# Patient Record
Sex: MALE | Race: WHITE | Employment: OTHER | URBAN - NONMETROPOLITAN AREA
[De-identification: names, ages, dates, MRNs, and addresses within clinical notes are randomized per-mention and may not be internally consistent; named-entity substitution may affect disease eponyms.]

---

## 2017-04-14 ENCOUNTER — OFFICE VISIT (OUTPATIENT)
Dept: CARDIOLOGY | Age: 67
End: 2017-04-14
Payer: COMMERCIAL

## 2017-04-14 VITALS
WEIGHT: 286 LBS | DIASTOLIC BLOOD PRESSURE: 82 MMHG | SYSTOLIC BLOOD PRESSURE: 138 MMHG | BODY MASS INDEX: 35.56 KG/M2 | HEART RATE: 73 BPM | HEIGHT: 75 IN

## 2017-04-14 DIAGNOSIS — I25.10 CORONARY ARTERY DISEASE INVOLVING NATIVE HEART WITHOUT ANGINA PECTORIS, UNSPECIFIED VESSEL OR LESION TYPE: ICD-10-CM

## 2017-04-14 DIAGNOSIS — I25.10 CORONARY ARTERY DISEASE INVOLVING NATIVE CORONARY ARTERY OF NATIVE HEART WITHOUT ANGINA PECTORIS: ICD-10-CM

## 2017-04-14 DIAGNOSIS — I73.9 CLAUDICATION (HCC): ICD-10-CM

## 2017-04-14 DIAGNOSIS — R06.09 DOE (DYSPNEA ON EXERTION): ICD-10-CM

## 2017-04-14 DIAGNOSIS — I10 ESSENTIAL HYPERTENSION: Primary | ICD-10-CM

## 2017-04-14 PROCEDURE — 93000 ELECTROCARDIOGRAM COMPLETE: CPT | Performed by: CLINICAL NURSE SPECIALIST

## 2017-04-14 PROCEDURE — 99214 OFFICE O/P EST MOD 30 MIN: CPT | Performed by: CLINICAL NURSE SPECIALIST

## 2017-04-14 RX ORDER — METOPROLOL SUCCINATE 25 MG/1
25 TABLET, EXTENDED RELEASE ORAL DAILY
Qty: 90 TABLET | Refills: 3 | Status: SHIPPED | OUTPATIENT
Start: 2017-04-14 | End: 2017-04-17 | Stop reason: SDUPTHER

## 2017-04-14 RX ORDER — LISINOPRIL 5 MG/1
5 TABLET ORAL 2 TIMES DAILY
Qty: 180 TABLET | Refills: 3 | Status: SHIPPED | OUTPATIENT
Start: 2017-04-14 | End: 2017-04-17 | Stop reason: SDUPTHER

## 2017-04-14 RX ORDER — ATORVASTATIN CALCIUM 40 MG/1
40 TABLET, FILM COATED ORAL NIGHTLY
Qty: 90 TABLET | Refills: 3 | Status: SHIPPED | OUTPATIENT
Start: 2017-04-14 | End: 2017-04-17 | Stop reason: SDUPTHER

## 2017-04-14 RX ORDER — PRASUGREL 10 MG/1
10 TABLET, FILM COATED ORAL DAILY
Qty: 90 TABLET | Refills: 3 | Status: SHIPPED | OUTPATIENT
Start: 2017-04-14 | End: 2017-04-17 | Stop reason: SDUPTHER

## 2017-04-17 DIAGNOSIS — I10 ESSENTIAL HYPERTENSION: ICD-10-CM

## 2017-04-17 DIAGNOSIS — I25.10 CORONARY ARTERY DISEASE INVOLVING NATIVE HEART WITHOUT ANGINA PECTORIS, UNSPECIFIED VESSEL OR LESION TYPE: ICD-10-CM

## 2017-04-17 RX ORDER — ATORVASTATIN CALCIUM 40 MG/1
40 TABLET, FILM COATED ORAL NIGHTLY
Qty: 90 TABLET | Refills: 3 | Status: SHIPPED | OUTPATIENT
Start: 2017-04-17 | End: 2021-08-26 | Stop reason: ALTCHOICE

## 2017-04-17 RX ORDER — LISINOPRIL 5 MG/1
5 TABLET ORAL 2 TIMES DAILY
Qty: 180 TABLET | Refills: 3 | Status: SHIPPED | OUTPATIENT
Start: 2017-04-17 | End: 2021-09-22 | Stop reason: ALTCHOICE

## 2017-04-17 RX ORDER — PRASUGREL 10 MG/1
10 TABLET, FILM COATED ORAL DAILY
Qty: 90 TABLET | Refills: 3 | Status: SHIPPED | OUTPATIENT
Start: 2017-04-17 | End: 2018-07-26 | Stop reason: ALTCHOICE

## 2017-04-17 RX ORDER — METOPROLOL SUCCINATE 25 MG/1
25 TABLET, EXTENDED RELEASE ORAL DAILY
Qty: 90 TABLET | Refills: 3 | Status: SHIPPED | OUTPATIENT
Start: 2017-04-17

## 2018-07-26 ENCOUNTER — APPOINTMENT (OUTPATIENT)
Dept: CT IMAGING | Age: 68
End: 2018-07-26
Payer: COMMERCIAL

## 2018-07-26 ENCOUNTER — HOSPITAL ENCOUNTER (EMERGENCY)
Age: 68
Discharge: HOME | End: 2018-07-26
Payer: COMMERCIAL

## 2018-07-26 VITALS
SYSTOLIC BLOOD PRESSURE: 126 MMHG | OXYGEN SATURATION: 94 % | RESPIRATION RATE: 18 BRPM | HEART RATE: 71 BPM | HEIGHT: 75 IN | DIASTOLIC BLOOD PRESSURE: 71 MMHG | TEMPERATURE: 97.8 F | WEIGHT: 252 LBS | BODY MASS INDEX: 31.33 KG/M2

## 2018-07-26 DIAGNOSIS — K40.90 LEFT INGUINAL HERNIA: ICD-10-CM

## 2018-07-26 DIAGNOSIS — N20.0 BILATERAL NEPHROLITHIASIS: ICD-10-CM
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
## 2018-07-26 DIAGNOSIS — S09.93XA FACIAL INJURY, INITIAL ENCOUNTER: ICD-10-CM

## 2018-07-26 DIAGNOSIS — Z79.01 ANTICOAGULATED ON COUMADIN: ICD-10-CM

## 2018-07-26 DIAGNOSIS — N20.1 CALCULUS OF PROXIMAL RIGHT URETER: Primary | ICD-10-CM

## 2018-07-26 LAB
ALBUMIN SERPL-MCNC: 3.7 G/DL (ref 3.5–5.2)
ALP BLD-CCNC: 71 U/L (ref 40–130)
ALT SERPL-CCNC: 25 U/L (ref 5–41)
ANION GAP SERPL CALCULATED.3IONS-SCNC: 11 MMOL/L (ref 7–19)
APTT: 37.8 SEC (ref 26–36.2)
AST SERPL-CCNC: 21 U/L (ref 5–40)
BACTERIA: NEGATIVE /HPF
BASOPHILS ABSOLUTE: 0 K/UL (ref 0–0.2)
BASOPHILS RELATIVE PERCENT: 0.3 % (ref 0–1)
BILIRUB SERPL-MCNC: 0.4 MG/DL (ref 0.2–1.2)
BILIRUBIN URINE: NEGATIVE
BLOOD, URINE: ABNORMAL
BUN BLDV-MCNC: 23 MG/DL (ref 8–23)
CALCIUM SERPL-MCNC: 9 MG/DL (ref 8.8–10.2)
CHLORIDE BLD-SCNC: 104 MMOL/L (ref 98–111)
CLARITY: CLEAR
CO2: 26 MMOL/L (ref 22–29)
COLOR: YELLOW
CREAT SERPL-MCNC: 1.3 MG/DL (ref 0.5–1.2)
EOSINOPHILS ABSOLUTE: 0.2 K/UL (ref 0–0.6)
EOSINOPHILS RELATIVE PERCENT: 2.3 % (ref 0–5)
EPITHELIAL CELLS, UA: 1 /HPF (ref 0–5)
GFR NON-AFRICAN AMERICAN: 55
GLUCOSE BLD-MCNC: 100 MG/DL (ref 74–109)
GLUCOSE URINE: NEGATIVE MG/DL
HCT VFR BLD CALC: 44.8 % (ref 42–52)
HEMOGLOBIN: 14.1 G/DL (ref 14–18)
HYALINE CASTS: 3 /HPF (ref 0–8)
INR BLD: 2.28 (ref 0.88–1.18)
KETONES, URINE: NEGATIVE MG/DL
LEUKOCYTE ESTERASE, URINE: ABNORMAL
LYMPHOCYTES ABSOLUTE: 0.9 K/UL (ref 1.1–4.5)
LYMPHOCYTES RELATIVE PERCENT: 14 % (ref 20–40)
MCH RBC QN AUTO: 26.8 PG (ref 27–31)
MCHC RBC AUTO-ENTMCNC: 31.5 G/DL (ref 33–37)
MCV RBC AUTO: 85.2 FL (ref 80–94)
MONOCYTES ABSOLUTE: 0.5 K/UL (ref 0–0.9)
MONOCYTES RELATIVE PERCENT: 7.8 % (ref 0–10)
NEUTROPHILS ABSOLUTE: 4.9 K/UL (ref 1.5–7.5)
NEUTROPHILS RELATIVE PERCENT: 75.1 % (ref 50–65)
NITRITE, URINE: NEGATIVE
PDW BLD-RTO: 13.9 % (ref 11.5–14.5)
PH UA: 6.5
PLATELET # BLD: 110 K/UL (ref 130–400)
PMV BLD AUTO: 12.1 FL (ref 9.4–12.4)
POTASSIUM SERPL-SCNC: 4.2 MMOL/L (ref 3.5–5)
PROTEIN UA: NEGATIVE MG/DL
PROTHROMBIN TIME: 25.2 SEC (ref 12–14.6)
RBC # BLD: 5.26 M/UL (ref 4.7–6.1)
RBC UA: 430 /HPF (ref 0–4)
SODIUM BLD-SCNC: 141 MMOL/L (ref 136–145)
SPECIFIC GRAVITY UA: 1.02
TOTAL PROTEIN: 7.4 G/DL (ref 6.6–8.7)
URINE REFLEX TO CULTURE: YES
UROBILINOGEN, URINE: 1 E.U./DL
WBC # BLD: 6.5 K/UL (ref 4.8–10.8)
WBC UA: 6 /HPF (ref 0–5)

## 2018-07-26 PROCEDURE — 6360000002 HC RX W HCPCS: Performed by: NURSE PRACTITIONER

## 2018-07-26 PROCEDURE — 2580000003 HC RX 258: Performed by: NURSE PRACTITIONER

## 2018-07-26 PROCEDURE — 80053 COMPREHEN METABOLIC PANEL: CPT

## 2018-07-26 PROCEDURE — 36415 COLL VENOUS BLD VENIPUNCTURE: CPT

## 2018-07-26 PROCEDURE — 96375 TX/PRO/DX INJ NEW DRUG ADDON: CPT

## 2018-07-26 PROCEDURE — 87077 CULTURE AEROBIC IDENTIFY: CPT

## 2018-07-26 PROCEDURE — 85730 THROMBOPLASTIN TIME PARTIAL: CPT

## 2018-07-26 PROCEDURE — 6360000004 HC RX CONTRAST MEDICATION: Performed by: NURSE PRACTITIONER

## 2018-07-26 PROCEDURE — 85610 PROTHROMBIN TIME: CPT

## 2018-07-26 PROCEDURE — 70450 CT HEAD/BRAIN W/O DYE: CPT

## 2018-07-26 PROCEDURE — 74177 CT ABD & PELVIS W/CONTRAST: CPT

## 2018-07-26 PROCEDURE — 96374 THER/PROPH/DIAG INJ IV PUSH: CPT

## 2018-07-26 PROCEDURE — 87086 URINE CULTURE/COLONY COUNT: CPT

## 2018-07-26 PROCEDURE — 85025 COMPLETE CBC W/AUTO DIFF WBC: CPT

## 2018-07-26 PROCEDURE — 99284 EMERGENCY DEPT VISIT MOD MDM: CPT | Performed by: NURSE PRACTITIONER

## 2018-07-26 PROCEDURE — 99284 EMERGENCY DEPT VISIT MOD MDM: CPT

## 2018-07-26 PROCEDURE — 81001 URINALYSIS AUTO W/SCOPE: CPT

## 2018-07-26 RX ORDER — HYDROCODONE BITARTRATE AND ACETAMINOPHEN 5; 325 MG/1; MG/1
1 TABLET ORAL EVERY 6 HOURS PRN
Qty: 12 TABLET | Refills: 0 | Status: SHIPPED | OUTPATIENT
Start: 2018-07-26 | End: 2018-07-29

## 2018-07-26 RX ORDER — MORPHINE SULFATE 1 MG/ML
INJECTION, SOLUTION EPIDURAL; INTRATHECAL; INTRAVENOUS
Status: DISCONTINUED
Start: 2018-07-26 | End: 2018-07-26 | Stop reason: WASHOUT

## 2018-07-26 RX ORDER — WARFARIN SODIUM 5 MG/1
5 TABLET ORAL
Status: ON HOLD | COMMUNITY
End: 2018-08-21 | Stop reason: HOSPADM

## 2018-07-26 RX ORDER — MORPHINE SULFATE 1 MG/ML
2 INJECTION, SOLUTION EPIDURAL; INTRATHECAL; INTRAVENOUS ONCE
Status: COMPLETED | OUTPATIENT
Start: 2018-07-26 | End: 2018-07-26

## 2018-07-26 RX ORDER — WARFARIN SODIUM 7.5 MG/1
7.5 TABLET ORAL
Status: ON HOLD | COMMUNITY
End: 2018-08-21 | Stop reason: HOSPADM

## 2018-07-26 RX ORDER — TAMSULOSIN HYDROCHLORIDE 0.4 MG/1
0.4 CAPSULE ORAL DAILY
Qty: 10 CAPSULE | Refills: 0 | Status: SHIPPED | OUTPATIENT
Start: 2018-07-26 | End: 2018-08-21 | Stop reason: SDUPTHER

## 2018-07-26 RX ORDER — ONDANSETRON 2 MG/ML
4 INJECTION INTRAMUSCULAR; INTRAVENOUS ONCE
Status: COMPLETED | OUTPATIENT
Start: 2018-07-26 | End: 2018-07-26

## 2018-07-26 RX ORDER — 0.9 % SODIUM CHLORIDE 0.9 %
1000 INTRAVENOUS SOLUTION INTRAVENOUS ONCE
Status: COMPLETED | OUTPATIENT
Start: 2018-07-26 | End: 2018-07-26

## 2018-07-26 RX ADMIN — IOPAMIDOL 95 ML: 755 INJECTION, SOLUTION INTRAVENOUS at 18:39

## 2018-07-26 RX ADMIN — ONDANSETRON HYDROCHLORIDE 4 MG: 2 INJECTION, SOLUTION INTRAMUSCULAR; INTRAVENOUS at 18:03

## 2018-07-26 RX ADMIN — Medication 2 MG: at 18:03

## 2018-07-26 RX ADMIN — SODIUM CHLORIDE 1000 ML: 9 INJECTION, SOLUTION INTRAVENOUS at 18:03

## 2018-07-26 ASSESSMENT — ENCOUNTER SYMPTOMS
SHORTNESS OF BREATH: 0
SORE THROAT: 0
NAUSEA: 0
BACK PAIN: 0
WHEEZING: 0
ABDOMINAL PAIN: 1
VOMITING: 0
DIARRHEA: 0
COUGH: 0
EYE DISCHARGE: 0

## 2018-07-26 ASSESSMENT — PAIN SCALES - GENERAL
PAINLEVEL_OUTOF10: 4
PAINLEVEL_OUTOF10: 3
PAINLEVEL_OUTOF10: 7
PAINLEVEL_OUTOF10: 7

## 2018-07-26 ASSESSMENT — PAIN DESCRIPTION - PAIN TYPE: TYPE: ACUTE PAIN

## 2018-07-26 NOTE — ED PROVIDER NOTES
[facility]  eMERGENCY dEPARTMENT eNCOUnter      Pt Name: Magi Machado  MRN: [id number]  [place] 1950  Date of evaluation: 7/26/2018  Provider: SANTI Holm    CHIEF COMPLAINT       Chief Complaint   Patient presents with    Flank Pain     pt presents to ED with c/o left flank pain currently has multiple kidney stones. HISTORY OF PRESENT ILLNESS  (Location/Symptom, Timing/Onset, Context/Setting, Quality, Duration, Modifying Factors, Severity.)   Magi Machado is a 79 y.o. male who presents to the emergency department With chief complaint of lower quadrant abdominal pain and hematuria. Patient states his abdominal pain has been ongoing for a couple of days. He reports hematuria yesterday however this has cleared. Patient reports being seen Tuesday by local emergency room and diagnosed with renal calculi. Patient states he has had multiple renal stones in the past however he has passed all of these independently without any interventions. Patient describes his pain is left flank pain on Tuesday however he states his pain has progressed to right and left lower quadrant pain today. He denies any fevers nausea or vomiting today. Patient is anticoagulated on Coumadin for CAD and she tells me [facility] did not agree with him. Additionally he reports sustaining a ground-level fall and striking his head on Tuesday and he was not seen and evaluated. He has a large ecchymosis noted to his right infraorbital region today. The history is provided by the patient. Nursing Notes were reviewed and I agree. REVIEW OF SYSTEMS    (2-9 systems for level 4, 10 or more for level 5)     Review of Systems   Constitutional: Negative for chills and fever. HENT: Negative for congestion, ear pain and sore throat. Eyes: Negative for discharge. Respiratory: Negative for cough, shortness of breath and wheezing. Cardiovascular: Negative for chest pain and palpitations.    Gastrointestinal: Positive for abdominal pain. Negative for diarrhea, nausea and vomiting. Genitourinary: Positive for hematuria. Negative for dysuria, frequency and urgency. Musculoskeletal: Negative for back pain and neck pain. Skin: Negative for rash. Neurological: Negative for dizziness and headaches. Hematological: Bruises/bleeds easily. Except as noted above the remainder of the review of systems was reviewed and negative. PAST MEDICAL HISTORY     Past Medical History:   Diagnosis Date    ACS (acute coronary syndrome) ([facility] [address] 4/27/2016 4/27/16  ACS  BRANDO RISK Score 1 (angina), AUC indication 3, AUC score 7 4/27/16  Cath  99% 1st diagonal, 2.25 x 15 YURIDIA, anterior lateral hypokinesis, EF 45%     CAD (coronary artery disease)     Cerebral artery occlusion with cerebral infarction ([facility] [address]     Hypertension 4/27/2016    MI (myocardial infarction)          SURGICAL HISTORY     History reviewed. No pertinent surgical history.       CURRENT MEDICATIONS       Discharge Medication List as of 7/26/2018  8:24 PM      CONTINUE these medications which have NOT CHANGED    Details   !! warfarin (COUMADIN) 7.5 MG tablet Take 7.5 mg by mouthHistorical Med      atorvastatin (LIPITOR) 40 MG tablet Take 1 tablet by mouth nightly, Disp-90 tablet, R-3Normal      lisinopril (PRINIVIL;ZESTRIL) 5 MG tablet Take 1 tablet by mouth 2 times daily, Disp-180 tablet, R-3Normal      metoprolol succinate (TOPROL XL) 25 MG extended release tablet Take 1 tablet by mouth daily, Disp-90 tablet, R-3Normal      Diphenhydramine-APAP, sleep, (TYLENOL PM EXTRA STRENGTH PO) Take by mouthHistorical Med      aspirin 81 MG chewable tablet Take 1 tablet by mouth daily, Disp-90 tablet, R-3Normal      nitroGLYCERIN (NITROSTAT) 0.4 MG SL tablet Place 1 tablet under the tongue every 5 minutes as needed for Chest pain, Disp-25 tablet, R-3      !! warfarin (COUMADIN) 5 MG tablet Take 5 mg by mouthHistorical Med       !! - Potential duplicate medications Notable for the following:     MCH 26.8 (*)     MCHC 31.5 (*)     Platelets 789 (*)     Neutrophils % 75.1 (*)     Lymphocytes % 14.0 (*)     Lymphocytes # 0.9 (*)     All other components within normal limits   COMPREHENSIVE METABOLIC PANEL - Abnormal; Notable for the following:     CREATININE 1.3 (*)     GFR Non- 55 (*)     All other components within normal limits   APTT - Abnormal; Notable for the following:     aPTT 37.8 (*)     All other components within normal limits   PROTIME-INR - Abnormal; Notable for the following:     Protime 25.2 (*)     INR 2.28 (*)     All other components within normal limits   MICROSCOPIC URINALYSIS - Abnormal; Notable for the following:     WBC, UA 6 (*)     RBC,  (*)     All other components within normal limits   URINE CULTURE       All other labs were within normal range or not returned as of this dictation. RE-ASSESSMENT          EMERGENCY DEPARTMENT COURSE and DIFFERENTIAL DIAGNOSIS/MDM:   Vitals:    Vitals:    07/26/18 1900 07/26/18 1933 07/26/18 2003 07/26/18 2033   BP: (!) 149/77 136/74 139/77 126/71   Pulse: 72 74 72 71   Resp: 16 16 18 18   Temp:    97.8 °F (36.6 °C)   TempSrc:       SpO2: 95% 95% 94% 94%   Weight:       Height:               MDM  Number of Diagnoses or Management Options  Anticoagulated on Coumadin:   Bilateral nephrolithiasis:   Calculus of proximal right ureter:   Facial injury, initial encounter:   Fall on same level from slipping, tripping or stumbling, initial encounter:   Left inguinal hernia:   Diagnosis management comments: Briefly the patient presents to the ED for left flank pain and a known renal calculi on the right since she was diagnosed about 10 days ago at [facility]. I have rescan the patient and he does have a 10 mm obstructing stone at the UPJ and a 3 mm nonobstructing at the UVJ. Left kidney is unremarkable except for atrophy. Patient states his pain is controlled after morphine in the ED.  At this time my

## 2018-07-28 LAB
ORGANISM: ABNORMAL
URINE CULTURE, ROUTINE: ABNORMAL
URINE CULTURE, ROUTINE: ABNORMAL

## 2018-08-01 ENCOUNTER — PREP FOR PROCEDURE (OUTPATIENT)
Dept: UROLOGY | Age: 68
End: 2018-08-01

## 2018-08-01 ENCOUNTER — HOSPITAL ENCOUNTER (OUTPATIENT)
Dept: GENERAL RADIOLOGY | Age: 68
Discharge: HOME | End: 2018-08-01
Payer: COMMERCIAL

## 2018-08-01 ENCOUNTER — OFFICE VISIT (OUTPATIENT)
Dept: UROLOGY | Age: 68
End: 2018-08-01
Payer: COMMERCIAL

## 2018-08-01 VITALS
WEIGHT: 258 LBS | SYSTOLIC BLOOD PRESSURE: 134 MMHG | DIASTOLIC BLOOD PRESSURE: 86 MMHG | TEMPERATURE: 97.5 F | BODY MASS INDEX: 32.08 KG/M2 | HEART RATE: 70 BPM | HEIGHT: 75 IN

## 2018-08-01 DIAGNOSIS — N20.1 URETERAL STONE: ICD-10-CM

## 2018-08-01 DIAGNOSIS — N20.0 KIDNEY STONE: ICD-10-CM

## 2018-08-01 DIAGNOSIS — N20.0 KIDNEY STONE: Primary | ICD-10-CM

## 2018-08-01 DIAGNOSIS — N47.1 ACQUIRED PHIMOSIS: ICD-10-CM

## 2018-08-01 PROBLEM — Z79.01 LONG TERM CURRENT USE OF ANTICOAGULANT THERAPY: Status: ACTIVE | Noted: 2018-06-21

## 2018-08-01 LAB
BACTERIA URINE, POC: NORMAL
BILIRUBIN URINE: 0 MG/DL
BLOOD, URINE: POSITIVE
CASTS URINE, POC: NORMAL
CLARITY: CLEAR
COLOR: YELLOW
CRYSTALS URINE, POC: NORMAL
EPI CELLS URINE, POC: NORMAL
GLUCOSE URINE: NORMAL
KETONES, URINE: NEGATIVE
LEUKOCYTE EST, POC: NORMAL
NITRITE, URINE: NEGATIVE
PH UA: 7 (ref 4.5–8)
PROTEIN UA: NEGATIVE
RBC URINE, POC: NORMAL
SPECIFIC GRAVITY UA: 1.02 (ref 1–1.03)
UROBILINOGEN, URINE: NORMAL
WBC URINE, POC: NORMAL
YEAST URINE, POC: NORMAL

## 2018-08-01 PROCEDURE — 99204 OFFICE O/P NEW MOD 45 MIN: CPT | Performed by: NURSE PRACTITIONER

## 2018-08-01 PROCEDURE — 74018 RADEX ABDOMEN 1 VIEW: CPT

## 2018-08-01 PROCEDURE — 81001 URINALYSIS AUTO W/SCOPE: CPT | Performed by: NURSE PRACTITIONER

## 2018-08-01 RX ORDER — BETAMETHASONE DIPROPIONATE 0.5 MG/G
CREAM TOPICAL
Qty: 15 G | Refills: 0 | Status: SHIPPED | OUTPATIENT
Start: 2018-08-01 | End: 2022-03-15

## 2018-08-01 ASSESSMENT — ENCOUNTER SYMPTOMS: ABDOMINAL PAIN: 0

## 2018-08-20 ENCOUNTER — HOSPITAL ENCOUNTER (OUTPATIENT)
Dept: PREADMISSION TESTING | Age: 68
Discharge: HOME | End: 2018-08-24
Payer: COMMERCIAL

## 2018-08-20 VITALS — WEIGHT: 252 LBS | BODY MASS INDEX: 31.33 KG/M2 | HEIGHT: 75 IN

## 2018-08-20 LAB
ANION GAP SERPL CALCULATED.3IONS-SCNC: 8 MMOL/L (ref 7–19)
APTT: 31.4 SEC (ref 26–36.2)
BASOPHILS ABSOLUTE: 0 K/UL (ref 0–0.2)
BASOPHILS RELATIVE PERCENT: 0.8 % (ref 0–1)
BUN BLDV-MCNC: 19 MG/DL (ref 8–23)
CALCIUM SERPL-MCNC: 9 MG/DL (ref 8.8–10.2)
CHLORIDE BLD-SCNC: 105 MMOL/L (ref 98–111)
CO2: 27 MMOL/L (ref 22–29)
COLLAGEN ADENOSINE-5'-DIPHOSPHATE (ADP) TIME: 116 SEC (ref 51–124)
COLLAGEN EPINEPHRINE TIME: >300 SEC (ref 84–176)
CREAT SERPL-MCNC: 1.2 MG/DL (ref 0.5–1.2)
EOSINOPHILS ABSOLUTE: 0.1 K/UL (ref 0–0.6)
EOSINOPHILS RELATIVE PERCENT: 2.6 % (ref 0–5)
GFR NON-AFRICAN AMERICAN: >60
GLUCOSE BLD-MCNC: 93 MG/DL (ref 74–109)
HCT VFR BLD CALC: 46.1 % (ref 42–52)
HEMOGLOBIN: 14.6 G/DL (ref 14–18)
INR BLD: 1.08 (ref 0.88–1.18)
LYMPHOCYTES ABSOLUTE: 1.3 K/UL (ref 1.1–4.5)
LYMPHOCYTES RELATIVE PERCENT: 24.4 % (ref 20–40)
MCH RBC QN AUTO: 26.8 PG (ref 27–31)
MCHC RBC AUTO-ENTMCNC: 31.7 G/DL (ref 33–37)
MCV RBC AUTO: 84.6 FL (ref 80–94)
MONOCYTES ABSOLUTE: 0.4 K/UL (ref 0–0.9)
MONOCYTES RELATIVE PERCENT: 7.9 % (ref 0–10)
NEUTROPHILS ABSOLUTE: 3.4 K/UL (ref 1.5–7.5)
NEUTROPHILS RELATIVE PERCENT: 63.9 % (ref 50–65)
PDW BLD-RTO: 13.6 % (ref 11.5–14.5)
PLATELET # BLD: 116 K/UL (ref 130–400)
PLATELET FUNCTION INTERPRETATION: ABNORMAL
PMV BLD AUTO: 12.5 FL (ref 9.4–12.4)
POTASSIUM SERPL-SCNC: 4.1 MMOL/L (ref 3.5–5)
PROTHROMBIN TIME: 13.9 SEC (ref 12–14.6)
RBC # BLD: 5.45 M/UL (ref 4.7–6.1)
SODIUM BLD-SCNC: 140 MMOL/L (ref 136–145)
WBC # BLD: 5.3 K/UL (ref 4.8–10.8)

## 2018-08-20 PROCEDURE — 85576 BLOOD PLATELET AGGREGATION: CPT

## 2018-08-20 PROCEDURE — 80048 BASIC METABOLIC PNL TOTAL CA: CPT

## 2018-08-20 PROCEDURE — 85025 COMPLETE CBC W/AUTO DIFF WBC: CPT

## 2018-08-20 PROCEDURE — 85730 THROMBOPLASTIN TIME PARTIAL: CPT

## 2018-08-20 PROCEDURE — 93005 ELECTROCARDIOGRAM TRACING: CPT

## 2018-08-20 PROCEDURE — 85610 PROTHROMBIN TIME: CPT

## 2018-08-20 RX ORDER — CIPROFLOXACIN 2 MG/ML
400 INJECTION, SOLUTION INTRAVENOUS ONCE
Status: CANCELLED | OUTPATIENT
Start: 2018-08-21

## 2018-08-21 ENCOUNTER — TELEPHONE (OUTPATIENT)
Dept: UROLOGY | Age: 68
End: 2018-08-21

## 2018-08-21 ENCOUNTER — ANESTHESIA EVENT (OUTPATIENT)
Dept: OPERATING ROOM | Age: 68
End: 2018-08-21
Payer: COMMERCIAL

## 2018-08-21 ENCOUNTER — ANESTHESIA (OUTPATIENT)
Dept: OPERATING ROOM | Age: 68
End: 2018-08-21
Payer: COMMERCIAL

## 2018-08-21 ENCOUNTER — HOSPITAL ENCOUNTER (OUTPATIENT)
Age: 68
Setting detail: OUTPATIENT SURGERY
Discharge: HOME | End: 2018-08-21
Attending: UROLOGY | Admitting: UROLOGY
Payer: COMMERCIAL

## 2018-08-21 ENCOUNTER — APPOINTMENT (OUTPATIENT)
Dept: GENERAL RADIOLOGY | Age: 68
End: 2018-08-21
Attending: UROLOGY
Payer: COMMERCIAL

## 2018-08-21 ENCOUNTER — OFFICE VISIT (OUTPATIENT)
Dept: UROLOGY | Age: 68
End: 2018-08-21
Payer: COMMERCIAL

## 2018-08-21 ENCOUNTER — HOSPITAL ENCOUNTER (OUTPATIENT)
Dept: CT IMAGING | Age: 68
Discharge: HOME | End: 2018-08-21
Payer: COMMERCIAL

## 2018-08-21 VITALS
OXYGEN SATURATION: 97 % | SYSTOLIC BLOOD PRESSURE: 153 MMHG | DIASTOLIC BLOOD PRESSURE: 83 MMHG | WEIGHT: 256 LBS | BODY MASS INDEX: 31.83 KG/M2 | RESPIRATION RATE: 16 BRPM | TEMPERATURE: 97.9 F | HEIGHT: 75 IN | HEART RATE: 61 BPM

## 2018-08-21 VITALS
SYSTOLIC BLOOD PRESSURE: 137 MMHG | DIASTOLIC BLOOD PRESSURE: 82 MMHG | TEMPERATURE: 98 F | OXYGEN SATURATION: 100 % | RESPIRATION RATE: 15 BRPM

## 2018-08-21 VITALS — BODY MASS INDEX: 31.83 KG/M2 | TEMPERATURE: 96.5 F | WEIGHT: 256 LBS | HEIGHT: 75 IN

## 2018-08-21 DIAGNOSIS — N47.1 ACQUIRED PHIMOSIS: ICD-10-CM

## 2018-08-21 DIAGNOSIS — R33.9 RETENTION OF URINE: ICD-10-CM

## 2018-08-21 DIAGNOSIS — G89.18 POSTOPERATIVE PAIN: Primary | ICD-10-CM

## 2018-08-21 DIAGNOSIS — Z87.442 HISTORY OF RENAL STONE: ICD-10-CM

## 2018-08-21 DIAGNOSIS — N20.1 RIGHT URETERAL CALCULUS: Primary | ICD-10-CM

## 2018-08-21 LAB
BILIRUBIN, POC: 0
BLOOD URINE, POC: NORMAL
CLARITY, POC: CLEAR
COLOR, POC: YELLOW
GLUCOSE URINE, POC: NORMAL
KETONES, POC: NORMAL
LEUKOCYTE EST, POC: NORMAL
NITRITE, POC: NORMAL
PH, POC: 7.5
PROTEIN, POC: NORMAL
SPECIFIC GRAVITY, POC: 1.02
UROBILINOGEN, POC: 4

## 2018-08-21 PROCEDURE — 99213 OFFICE O/P EST LOW 20 MIN: CPT | Performed by: NURSE PRACTITIONER

## 2018-08-21 PROCEDURE — 6360000002 HC RX W HCPCS: Performed by: NURSE ANESTHETIST, CERTIFIED REGISTERED

## 2018-08-21 PROCEDURE — 6360000002 HC RX W HCPCS: Performed by: UROLOGY

## 2018-08-21 PROCEDURE — 3017F COLORECTAL CA SCREEN DOC REV: CPT | Performed by: NURSE PRACTITIONER

## 2018-08-21 PROCEDURE — 2500000003 HC RX 250 WO HCPCS

## 2018-08-21 PROCEDURE — 1123F ACP DISCUSS/DSCN MKR DOCD: CPT | Performed by: NURSE PRACTITIONER

## 2018-08-21 PROCEDURE — 1036F TOBACCO NON-USER: CPT | Performed by: NURSE PRACTITIONER

## 2018-08-21 PROCEDURE — 54001 SLITTING OF PREPUCE: CPT | Performed by: UROLOGY

## 2018-08-21 PROCEDURE — 99999 PR OFFICE/OUTPT VISIT,PROCEDURE ONLY: CPT | Performed by: UROLOGY

## 2018-08-21 PROCEDURE — 51798 US URINE CAPACITY MEASURE: CPT | Performed by: NURSE PRACTITIONER

## 2018-08-21 PROCEDURE — 3600000014 HC SURGERY LEVEL 4 ADDTL 15MIN: Performed by: UROLOGY

## 2018-08-21 PROCEDURE — 2720000010 HC SURG SUPPLY STERILE: Performed by: UROLOGY

## 2018-08-21 PROCEDURE — 81003 URINALYSIS AUTO W/O SCOPE: CPT | Performed by: NURSE PRACTITIONER

## 2018-08-21 PROCEDURE — 3600000004 HC SURGERY LEVEL 4 BASE: Performed by: UROLOGY

## 2018-08-21 PROCEDURE — C1726 CATH, BAL DIL, NON-VASCULAR: HCPCS | Performed by: UROLOGY

## 2018-08-21 PROCEDURE — 2709999900 HC NON-CHARGEABLE SUPPLY: Performed by: UROLOGY

## 2018-08-21 PROCEDURE — G8417 CALC BMI ABV UP PARAM F/U: HCPCS | Performed by: NURSE PRACTITIONER

## 2018-08-21 PROCEDURE — G8427 DOCREV CUR MEDS BY ELIG CLIN: HCPCS | Performed by: NURSE PRACTITIONER

## 2018-08-21 PROCEDURE — 2500000003 HC RX 250 WO HCPCS: Performed by: UROLOGY

## 2018-08-21 PROCEDURE — 6370000000 HC RX 637 (ALT 250 FOR IP): Performed by: ANESTHESIOLOGY

## 2018-08-21 PROCEDURE — 4040F PNEUMOC VAC/ADMIN/RCVD: CPT | Performed by: NURSE PRACTITIONER

## 2018-08-21 PROCEDURE — 7100000010 HC PHASE II RECOVERY - FIRST 15 MIN: Performed by: UROLOGY

## 2018-08-21 PROCEDURE — 6370000000 HC RX 637 (ALT 250 FOR IP): Performed by: UROLOGY

## 2018-08-21 PROCEDURE — C1769 GUIDE WIRE: HCPCS | Performed by: UROLOGY

## 2018-08-21 PROCEDURE — 74420 UROGRAPHY RTRGR +-KUB: CPT

## 2018-08-21 PROCEDURE — 52356 CYSTO/URETERO W/LITHOTRIPSY: CPT | Performed by: UROLOGY

## 2018-08-21 PROCEDURE — 7100000011 HC PHASE II RECOVERY - ADDTL 15 MIN: Performed by: UROLOGY

## 2018-08-21 PROCEDURE — C1758 CATHETER, URETERAL: HCPCS | Performed by: UROLOGY

## 2018-08-21 PROCEDURE — 7100000001 HC PACU RECOVERY - ADDTL 15 MIN: Performed by: UROLOGY

## 2018-08-21 PROCEDURE — 7100000000 HC PACU RECOVERY - FIRST 15 MIN: Performed by: UROLOGY

## 2018-08-21 PROCEDURE — 74018 RADEX ABDOMEN 1 VIEW: CPT

## 2018-08-21 PROCEDURE — C2617 STENT, NON-COR, TEM W/O DEL: HCPCS | Performed by: UROLOGY

## 2018-08-21 PROCEDURE — 2580000003 HC RX 258: Performed by: ANESTHESIOLOGY

## 2018-08-21 PROCEDURE — 2500000003 HC RX 250 WO HCPCS: Performed by: NURSE ANESTHETIST, CERTIFIED REGISTERED

## 2018-08-21 PROCEDURE — 3700000000 HC ANESTHESIA ATTENDED CARE: Performed by: UROLOGY

## 2018-08-21 PROCEDURE — S0028 INJECTION, FAMOTIDINE, 20 MG: HCPCS

## 2018-08-21 PROCEDURE — 3700000001 HC ADD 15 MINUTES (ANESTHESIA): Performed by: UROLOGY

## 2018-08-21 PROCEDURE — 2720000000 HC MISC SURG SUPPLY STERILE $0-50: Performed by: UROLOGY

## 2018-08-21 PROCEDURE — 1101F PT FALLS ASSESS-DOCD LE1/YR: CPT | Performed by: NURSE PRACTITIONER

## 2018-08-21 PROCEDURE — 74176 CT ABD & PELVIS W/O CONTRAST: CPT

## 2018-08-21 PROCEDURE — G8598 ASA/ANTIPLAT THER USED: HCPCS | Performed by: NURSE PRACTITIONER

## 2018-08-21 DEVICE — URETERAL STENT
Type: IMPLANTABLE DEVICE | Status: FUNCTIONAL
Brand: POLARIS™ ULTRA

## 2018-08-21 RX ORDER — SODIUM CHLORIDE, SODIUM LACTATE, POTASSIUM CHLORIDE, CALCIUM CHLORIDE 600; 310; 30; 20 MG/100ML; MG/100ML; MG/100ML; MG/100ML
INJECTION, SOLUTION INTRAVENOUS CONTINUOUS
Status: DISCONTINUED | OUTPATIENT
Start: 2018-08-21 | End: 2018-08-21 | Stop reason: HOSPADM

## 2018-08-21 RX ORDER — CIPROFLOXACIN 500 MG/1
500 TABLET, FILM COATED ORAL 2 TIMES DAILY
Qty: 14 TABLET | Refills: 0 | Status: SHIPPED | OUTPATIENT
Start: 2018-08-21 | End: 2018-08-28

## 2018-08-21 RX ORDER — SODIUM CHLORIDE 9 MG/ML
INJECTION, SOLUTION INTRAVENOUS CONTINUOUS
Status: DISCONTINUED | OUTPATIENT
Start: 2018-08-21 | End: 2018-08-21 | Stop reason: HOSPADM

## 2018-08-21 RX ORDER — BUPIVACAINE HYDROCHLORIDE 5 MG/ML
INJECTION, SOLUTION PERINEURAL PRN
Status: DISCONTINUED | OUTPATIENT
Start: 2018-08-21 | End: 2018-08-21 | Stop reason: HOSPADM

## 2018-08-21 RX ORDER — METOPROLOL SUCCINATE 50 MG/1
25 TABLET, EXTENDED RELEASE ORAL ONCE
Status: COMPLETED | OUTPATIENT
Start: 2018-08-21 | End: 2018-08-21

## 2018-08-21 RX ORDER — OXYCODONE HYDROCHLORIDE AND ACETAMINOPHEN 5; 325 MG/1; MG/1
1 TABLET ORAL EVERY 4 HOURS PRN
Qty: 30 TABLET | Refills: 0 | Status: SHIPPED | OUTPATIENT
Start: 2018-08-21 | End: 2018-08-28

## 2018-08-21 RX ORDER — FENTANYL CITRATE 50 UG/ML
50 INJECTION, SOLUTION INTRAMUSCULAR; INTRAVENOUS
Status: DISCONTINUED | OUTPATIENT
Start: 2018-08-21 | End: 2018-08-21 | Stop reason: HOSPADM

## 2018-08-21 RX ORDER — ROCURONIUM BROMIDE 10 MG/ML
INJECTION, SOLUTION INTRAVENOUS PRN
Status: DISCONTINUED | OUTPATIENT
Start: 2018-08-21 | End: 2018-08-21 | Stop reason: SDUPTHER

## 2018-08-21 RX ORDER — MIDAZOLAM HYDROCHLORIDE 1 MG/ML
2 INJECTION INTRAMUSCULAR; INTRAVENOUS
Status: DISCONTINUED | OUTPATIENT
Start: 2018-08-21 | End: 2018-08-21 | Stop reason: HOSPADM

## 2018-08-21 RX ORDER — MORPHINE SULFATE 1 MG/ML
2 INJECTION, SOLUTION EPIDURAL; INTRATHECAL; INTRAVENOUS EVERY 5 MIN PRN
Status: DISCONTINUED | OUTPATIENT
Start: 2018-08-21 | End: 2018-08-21 | Stop reason: HOSPADM

## 2018-08-21 RX ORDER — SCOLOPAMINE TRANSDERMAL SYSTEM 1 MG/1
1 PATCH, EXTENDED RELEASE TRANSDERMAL ONCE
Status: DISCONTINUED | OUTPATIENT
Start: 2018-08-21 | End: 2018-08-21 | Stop reason: HOSPADM

## 2018-08-21 RX ORDER — ATROPA BELLADONNA AND OPIUM 16.2; 6 MG/1; MG/1
SUPPOSITORY RECTAL PRN
Status: DISCONTINUED | OUTPATIENT
Start: 2018-08-21 | End: 2018-08-21 | Stop reason: HOSPADM

## 2018-08-21 RX ORDER — PROMETHAZINE HYDROCHLORIDE 25 MG/ML
6.25 INJECTION, SOLUTION INTRAMUSCULAR; INTRAVENOUS
Status: DISCONTINUED | OUTPATIENT
Start: 2018-08-21 | End: 2018-08-21 | Stop reason: HOSPADM

## 2018-08-21 RX ORDER — TAMSULOSIN HYDROCHLORIDE 0.4 MG/1
0.4 CAPSULE ORAL DAILY
Qty: 14 CAPSULE | Refills: 1 | Status: SHIPPED | OUTPATIENT
Start: 2018-08-21 | End: 2019-03-05

## 2018-08-21 RX ORDER — SODIUM CHLORIDE 0.9 % (FLUSH) 0.9 %
10 SYRINGE (ML) INJECTION EVERY 12 HOURS SCHEDULED
Status: DISCONTINUED | OUTPATIENT
Start: 2018-08-21 | End: 2018-08-21 | Stop reason: HOSPADM

## 2018-08-21 RX ORDER — LIDOCAINE HYDROCHLORIDE 10 MG/ML
1 INJECTION, SOLUTION EPIDURAL; INFILTRATION; INTRACAUDAL; PERINEURAL
Status: DISCONTINUED | OUTPATIENT
Start: 2018-08-21 | End: 2018-08-21 | Stop reason: HOSPADM

## 2018-08-21 RX ORDER — ENALAPRILAT 2.5 MG/2ML
1.25 INJECTION INTRAVENOUS
Status: DISCONTINUED | OUTPATIENT
Start: 2018-08-21 | End: 2018-08-21 | Stop reason: HOSPADM

## 2018-08-21 RX ORDER — LIDOCAINE HYDROCHLORIDE 10 MG/ML
INJECTION, SOLUTION INFILTRATION; PERINEURAL PRN
Status: DISCONTINUED | OUTPATIENT
Start: 2018-08-21 | End: 2018-08-21 | Stop reason: HOSPADM

## 2018-08-21 RX ORDER — HYDROMORPHONE HCL IN 0.9% NACL 0.5 MG/ML
0.25 SYRINGE (ML) INTRAVENOUS EVERY 5 MIN PRN
Status: DISCONTINUED | OUTPATIENT
Start: 2018-08-21 | End: 2018-08-21 | Stop reason: HOSPADM

## 2018-08-21 RX ORDER — TAMSULOSIN HYDROCHLORIDE 0.4 MG/1
0.4 CAPSULE ORAL ONCE
Status: COMPLETED | OUTPATIENT
Start: 2018-08-21 | End: 2018-08-21

## 2018-08-21 RX ORDER — OXYCODONE HYDROCHLORIDE AND ACETAMINOPHEN 5; 325 MG/1; MG/1
2 TABLET ORAL EVERY 4 HOURS PRN
Status: DISCONTINUED | OUTPATIENT
Start: 2018-08-21 | End: 2018-08-21 | Stop reason: HOSPADM

## 2018-08-21 RX ORDER — TAMSULOSIN HYDROCHLORIDE 0.4 MG/1
0.8 CAPSULE ORAL DAILY
Qty: 14 CAPSULE | Refills: 0 | Status: ON HOLD | OUTPATIENT
Start: 2018-08-21 | End: 2018-08-21

## 2018-08-21 RX ORDER — LABETALOL HYDROCHLORIDE 5 MG/ML
5 INJECTION, SOLUTION INTRAVENOUS EVERY 10 MIN PRN
Status: DISCONTINUED | OUTPATIENT
Start: 2018-08-21 | End: 2018-08-21 | Stop reason: HOSPADM

## 2018-08-21 RX ORDER — EPHEDRINE SULFATE 50 MG/ML
INJECTION, SOLUTION INTRAVENOUS PRN
Status: DISCONTINUED | OUTPATIENT
Start: 2018-08-21 | End: 2018-08-21 | Stop reason: SDUPTHER

## 2018-08-21 RX ORDER — HYDROMORPHONE HCL IN 0.9% NACL 0.5 MG/ML
1 SYRINGE (ML) INTRAVENOUS
Status: DISCONTINUED | OUTPATIENT
Start: 2018-08-21 | End: 2018-08-21 | Stop reason: HOSPADM

## 2018-08-21 RX ORDER — MIDAZOLAM HYDROCHLORIDE 1 MG/ML
INJECTION INTRAMUSCULAR; INTRAVENOUS PRN
Status: DISCONTINUED | OUTPATIENT
Start: 2018-08-21 | End: 2018-08-21 | Stop reason: SDUPTHER

## 2018-08-21 RX ORDER — SODIUM CHLORIDE 0.9 % (FLUSH) 0.9 %
10 SYRINGE (ML) INJECTION PRN
Status: DISCONTINUED | OUTPATIENT
Start: 2018-08-21 | End: 2018-08-21 | Stop reason: HOSPADM

## 2018-08-21 RX ORDER — DEXAMETHASONE SODIUM PHOSPHATE 10 MG/ML
INJECTION INTRAMUSCULAR; INTRAVENOUS PRN
Status: DISCONTINUED | OUTPATIENT
Start: 2018-08-21 | End: 2018-08-21 | Stop reason: SDUPTHER

## 2018-08-21 RX ORDER — CIPROFLOXACIN 2 MG/ML
400 INJECTION, SOLUTION INTRAVENOUS ONCE
Status: COMPLETED | OUTPATIENT
Start: 2018-08-21 | End: 2018-08-21

## 2018-08-21 RX ORDER — LIDOCAINE HYDROCHLORIDE 10 MG/ML
INJECTION, SOLUTION EPIDURAL; INFILTRATION; INTRACAUDAL; PERINEURAL PRN
Status: DISCONTINUED | OUTPATIENT
Start: 2018-08-21 | End: 2018-08-21 | Stop reason: SDUPTHER

## 2018-08-21 RX ORDER — SUCCINYLCHOLINE CHLORIDE 20 MG/ML
INJECTION INTRAMUSCULAR; INTRAVENOUS PRN
Status: DISCONTINUED | OUTPATIENT
Start: 2018-08-21 | End: 2018-08-21 | Stop reason: SDUPTHER

## 2018-08-21 RX ORDER — PROPOFOL 10 MG/ML
INJECTION, EMULSION INTRAVENOUS PRN
Status: DISCONTINUED | OUTPATIENT
Start: 2018-08-21 | End: 2018-08-21 | Stop reason: SDUPTHER

## 2018-08-21 RX ORDER — PHENAZOPYRIDINE HYDROCHLORIDE 100 MG/1
100 TABLET, FILM COATED ORAL 3 TIMES DAILY PRN
Qty: 30 TABLET | Refills: 1 | Status: SHIPPED | OUTPATIENT
Start: 2018-08-21 | End: 2019-03-05

## 2018-08-21 RX ORDER — ONDANSETRON 2 MG/ML
4 INJECTION INTRAMUSCULAR; INTRAVENOUS EVERY 4 HOURS PRN
Status: DISCONTINUED | OUTPATIENT
Start: 2018-08-21 | End: 2018-08-21 | Stop reason: HOSPADM

## 2018-08-21 RX ORDER — ONDANSETRON 2 MG/ML
INJECTION INTRAMUSCULAR; INTRAVENOUS PRN
Status: DISCONTINUED | OUTPATIENT
Start: 2018-08-21 | End: 2018-08-21 | Stop reason: SDUPTHER

## 2018-08-21 RX ORDER — MORPHINE SULFATE 1 MG/ML
4 INJECTION, SOLUTION EPIDURAL; INTRATHECAL; INTRAVENOUS EVERY 5 MIN PRN
Status: DISCONTINUED | OUTPATIENT
Start: 2018-08-21 | End: 2018-08-21 | Stop reason: HOSPADM

## 2018-08-21 RX ORDER — HYDROMORPHONE HCL IN 0.9% NACL 0.5 MG/ML
0.5 SYRINGE (ML) INTRAVENOUS EVERY 5 MIN PRN
Status: DISCONTINUED | OUTPATIENT
Start: 2018-08-21 | End: 2018-08-21 | Stop reason: HOSPADM

## 2018-08-21 RX ORDER — BACITRACIN ZINC AND POLYMYXIN B SULFATE 500; 1000 [USP'U]/G; [USP'U]/G
OINTMENT TOPICAL
Qty: 15 G | Refills: 1 | COMMUNITY
Start: 2018-08-21 | End: 2022-03-15

## 2018-08-21 RX ORDER — CLOTRIMAZOLE AND BETAMETHASONE DIPROPIONATE 10; .5 MG/ML; MG/ML
LOTION TOPICAL
Qty: 30 ML | Refills: 0 | Status: ON HOLD | OUTPATIENT
Start: 2018-08-21 | End: 2018-08-21 | Stop reason: ALTCHOICE

## 2018-08-21 RX ORDER — HYDRALAZINE HYDROCHLORIDE 20 MG/ML
5 INJECTION INTRAMUSCULAR; INTRAVENOUS EVERY 10 MIN PRN
Status: DISCONTINUED | OUTPATIENT
Start: 2018-08-21 | End: 2018-08-21 | Stop reason: HOSPADM

## 2018-08-21 RX ORDER — FENTANYL CITRATE 50 UG/ML
INJECTION, SOLUTION INTRAMUSCULAR; INTRAVENOUS PRN
Status: DISCONTINUED | OUTPATIENT
Start: 2018-08-21 | End: 2018-08-21 | Stop reason: SDUPTHER

## 2018-08-21 RX ORDER — PHENAZOPYRIDINE HYDROCHLORIDE 100 MG/1
100 TABLET, FILM COATED ORAL ONCE
Status: COMPLETED | OUTPATIENT
Start: 2018-08-21 | End: 2018-08-21

## 2018-08-21 RX ORDER — METOCLOPRAMIDE HYDROCHLORIDE 5 MG/ML
10 INJECTION INTRAMUSCULAR; INTRAVENOUS
Status: COMPLETED | OUTPATIENT
Start: 2018-08-21 | End: 2018-08-21

## 2018-08-21 RX ORDER — MEPERIDINE HYDROCHLORIDE 50 MG/ML
12.5 INJECTION INTRAMUSCULAR; INTRAVENOUS; SUBCUTANEOUS EVERY 5 MIN PRN
Status: DISCONTINUED | OUTPATIENT
Start: 2018-08-21 | End: 2018-08-21 | Stop reason: HOSPADM

## 2018-08-21 RX ORDER — DIPHENHYDRAMINE HYDROCHLORIDE 50 MG/ML
12.5 INJECTION INTRAMUSCULAR; INTRAVENOUS
Status: DISCONTINUED | OUTPATIENT
Start: 2018-08-21 | End: 2018-08-21 | Stop reason: HOSPADM

## 2018-08-21 RX ADMIN — ONDANSETRON HYDROCHLORIDE 4 MG: 2 INJECTION, SOLUTION INTRAMUSCULAR; INTRAVENOUS at 16:21

## 2018-08-21 RX ADMIN — ROCURONIUM BROMIDE 5 MG: 10 INJECTION INTRAVENOUS at 15:44

## 2018-08-21 RX ADMIN — CIPROFLOXACIN 400 MG: 2 INJECTION INTRAVENOUS at 15:50

## 2018-08-21 RX ADMIN — PHENAZOPYRIDINE 100 MG: 100 TABLET ORAL at 17:36

## 2018-08-21 RX ADMIN — FENTANYL CITRATE 100 MCG: 50 INJECTION INTRAMUSCULAR; INTRAVENOUS at 15:51

## 2018-08-21 RX ADMIN — METOPROLOL SUCCINATE 25 MG: 50 TABLET, EXTENDED RELEASE ORAL at 15:03

## 2018-08-21 RX ADMIN — FAMOTIDINE 20 MG: 10 INJECTION INTRAVENOUS at 15:07

## 2018-08-21 RX ADMIN — MIDAZOLAM 2 MG: 1 INJECTION INTRAMUSCULAR; INTRAVENOUS at 15:40

## 2018-08-21 RX ADMIN — ROCURONIUM BROMIDE 45 MG: 10 INJECTION INTRAVENOUS at 15:58

## 2018-08-21 RX ADMIN — TAMSULOSIN HYDROCHLORIDE 0.4 MG: 0.4 CAPSULE ORAL at 17:36

## 2018-08-21 RX ADMIN — EPHEDRINE SULFATE 20 MG: 50 INJECTION, SOLUTION INTRAMUSCULAR; INTRAVENOUS; SUBCUTANEOUS at 16:32

## 2018-08-21 RX ADMIN — PROPOFOL 200 MG: 10 INJECTION, EMULSION INTRAVENOUS at 15:47

## 2018-08-21 RX ADMIN — SODIUM CHLORIDE, SODIUM LACTATE, POTASSIUM CHLORIDE, AND CALCIUM CHLORIDE: 600; 310; 30; 20 INJECTION, SOLUTION INTRAVENOUS at 16:36

## 2018-08-21 RX ADMIN — DEXAMETHASONE SODIUM PHOSPHATE 10 MG: 10 INJECTION INTRAMUSCULAR; INTRAVENOUS at 15:55

## 2018-08-21 RX ADMIN — LIDOCAINE HYDROCHLORIDE 50 MG: 10 INJECTION, SOLUTION EPIDURAL; INFILTRATION; INTRACAUDAL; PERINEURAL at 15:47

## 2018-08-21 RX ADMIN — Medication 140 MG: at 15:47

## 2018-08-21 RX ADMIN — SODIUM CHLORIDE, SODIUM LACTATE, POTASSIUM CHLORIDE, AND CALCIUM CHLORIDE: 600; 310; 30; 20 INJECTION, SOLUTION INTRAVENOUS at 15:03

## 2018-08-21 RX ADMIN — METOCLOPRAMIDE 10 MG: 5 INJECTION, SOLUTION INTRAMUSCULAR; INTRAVENOUS at 15:02

## 2018-08-21 ASSESSMENT — PAIN SCALES - GENERAL
PAINLEVEL_OUTOF10: 0
PAINLEVEL_OUTOF10: 1

## 2018-08-21 ASSESSMENT — PAIN DESCRIPTION - DESCRIPTORS: DESCRIPTORS: PRESSURE

## 2018-08-21 ASSESSMENT — LIFESTYLE VARIABLES: SMOKING_STATUS: 0

## 2018-08-21 ASSESSMENT — ENCOUNTER SYMPTOMS
SHORTNESS OF BREATH: 0
COUGH: 0

## 2018-08-21 ASSESSMENT — PAIN DESCRIPTION - LOCATION: LOCATION: ABDOMEN

## 2018-08-21 ASSESSMENT — PAIN DESCRIPTION - PAIN TYPE: TYPE: SURGICAL PAIN

## 2018-08-21 ASSESSMENT — PAIN DESCRIPTION - ORIENTATION: ORIENTATION: RIGHT

## 2018-08-21 NOTE — TELEPHONE ENCOUNTER
I spoke with Power Jain, Mr Angeles Hernandez wife, and let her know that his surgery had to be rescheduled because his platelet function test was abnormal. I asked her if Mr Ramiro Antunez had been taking any Aspirin or Nsaids. She stated that he did not stop the Aspirin because he was only told to stop the coumadin. I let Dr Arya Myers know the situation and he stated that his sugery would need to be rescheduled due to the risk of bleeding. When I told Mrs Ramiro Antunez this, she became upset. I apologized for the miscommunication and explained to her the risks of having surgery when a blood thinner is not stopped. She voiced understanding, and I let her know that I got him rescheduled for the following Tuesday, Aug 27. I let her know that I would call Yeni Denton (his PCP) and see what needed to be done about the Lovenox since he was bridging with that while he was not taking the coumadin.

## 2018-08-21 NOTE — INTERVAL H&P NOTE
H&P reviewed. The patient was examined and there are no changes to the H&P. CT reviewed done 8/21/18 right mid ureteral calculus and right renal calculus, also has phimosis. Plan: right ureteroscopy laser lithotripsy stone extraction and stent placement for mid ureteral stone and will do dorsal slit today.  Will plan fro right renal ESWL next week for right renal pelvic stone after he has been of his aspirin

## 2018-08-21 NOTE — ANESTHESIA PRE PROCEDURE
Describes it as a sensitivity. Problem List:    Patient Active Problem List   Diagnosis Code    ACS (acute coronary syndrome) ([facility] I24.9    Family history of early CAD Z80.55    Hypertension I10    Essential hypertension I10    CAD (coronary artery disease) I25.10    Long term current use of anticoagulant therapy Z79.01    Renal calculus, right N20.0    Phimosis N47.1    Right ureteral calculus N20.1       Past Medical History:        Diagnosis Date    ACS (acute coronary syndrome) ([facility] 4/27/2016 4/27/16  ACS  BRANDO RISK Score 1 (angina), AUC indication 3, AUC score 7 4/27/16  Cath  99% 1st diagonal, 2.25 x 15 YURIDIA, anterior lateral hypokinesis, EF 45%     CAD (coronary artery disease)     sees dr. Brenda Mckeon Cerebral artery occlusion with cerebral infarction ([facility] 1998    Difficulty voiding     per pt c/o.     Hyperlipidemia     Hypertension 4/27/2016    MI (myocardial infarction) ([facility]     Renal calculus, right 8/1/2018       Past Surgical History:        Procedure Laterality Date    CARDIAC CATHETERIZATION      COLONOSCOPY         Social History:    Social History   Substance Use Topics    Smoking status: Former Smoker     Packs/day: 3.00     Years: 35.00    Smokeless tobacco: Former User     Quit date: 4/27/2001    Alcohol use No                                Counseling given: Not Answered      Vital Signs (Current):   Vitals:    08/21/18 1412   BP: (!) 149/90   Pulse: 65   Resp: 18   Temp: 97.8 °F (36.6 °C)   TempSrc: Temporal   SpO2: 97%   Weight: 256 lb (116.1 kg)   Height: 6' 3\" (1.905 m)                                              BP Readings from Last 3 Encounters:   08/21/18 (!) 149/90   08/01/18 134/86   07/26/18 126/71       NPO Status: Time of last liquid consumption: 0930                        Time of last solid consumption: 0930                        Date of last liquid consumption: 08/21/18                        Date of last solid food consumption: 08/21/18    BMI:   Wt Readings from Last 3 Encounters:   08/21/18 256 lb (116.1 kg)   08/21/18 256 lb (116.1 kg)   08/20/18 252 lb (114.3 kg)     Body mass index is 32 kg/m². CBC:   Lab Results   Component Value Date    WBC 5.3 08/20/2018    RBC 5.45 08/20/2018    HGB 14.6 08/20/2018    HCT 46.1 08/20/2018    MCV 84.6 08/20/2018    RDW 13.6 08/20/2018     08/20/2018       CMP:   Lab Results   Component Value Date     08/20/2018    K 4.1 08/20/2018     08/20/2018    CO2 27 08/20/2018    BUN 19 08/20/2018    CREATININE 1.2 08/20/2018    LABGLOM >60 08/20/2018    GLUCOSE 93 08/20/2018    PROT 7.4 07/26/2018    CALCIUM 9.0 08/20/2018    BILITOT 0.4 07/26/2018    ALKPHOS 71 07/26/2018    AST 21 07/26/2018    ALT 25 07/26/2018       POC Tests: No results for input(s): POCGLU, POCNA, POCK, POCCL, POCBUN, POCHEMO, POCHCT in the last 72 hours.     Coags:   Lab Results   Component Value Date    PROTIME 13.9 08/20/2018    INR 1.08 08/20/2018    APTT 31.4 08/20/2018       HCG (If Applicable): No results found for: PREGTESTUR, PREGSERUM, HCG, HCGQUANT     ABGs: No results found for: PHART, PO2ART, NPL1ZGY, YBO7OGO, BEART, Y9THCILZ     Type & Screen (If Applicable):  No results found for: LABABO, [address]    Anesthesia Evaluation  Patient summary reviewed and Nursing notes reviewed no history of anesthetic complications:   Airway: Mallampati: II  TM distance: >3 FB   Neck ROM: full  Mouth opening: > = 3 FB Dental:          Pulmonary:normal exam        (-) not a current smoker                           Cardiovascular:    (+) hypertension:, past MI:, CAD:, CHF:, hyperlipidemia         Beta Blocker:  Dose within 24 Hrs      ROS comment: EF 45%     Neuro/Psych:   (+) CVA:,             GI/Hepatic/Renal:   (+) GERD: well controlled,           Endo/Other:    (+) blood dyscrasia: anticoagulation therapy:., .    (-) diabetes mellitus               Abdominal:           Vascular:

## 2018-08-22 ENCOUNTER — HOSPITAL ENCOUNTER (OUTPATIENT)
Dept: GENERAL RADIOLOGY | Age: 68
Discharge: HOME | End: 2018-08-22
Payer: COMMERCIAL

## 2018-08-22 DIAGNOSIS — N20.0 KIDNEY STONE: ICD-10-CM

## 2018-08-22 LAB
EKG P AXIS: -21 DEGREES
EKG P-R INTERVAL: 140 MS
EKG Q-T INTERVAL: 454 MS
EKG QRS DURATION: 106 MS
EKG QTC CALCULATION (BAZETT): 444 MS
EKG T AXIS: 44 DEGREES

## 2018-08-22 PROCEDURE — 3209999900 FLUORO FOR SURGICAL PROCEDURES

## 2018-08-27 ENCOUNTER — APPOINTMENT (OUTPATIENT)
Dept: CT IMAGING | Age: 68
End: 2018-08-27
Payer: COMMERCIAL

## 2018-08-27 ENCOUNTER — HOSPITAL ENCOUNTER (EMERGENCY)
Age: 68
Discharge: HOME | End: 2018-08-27
Attending: EMERGENCY MEDICINE
Payer: COMMERCIAL

## 2018-08-27 VITALS
DIASTOLIC BLOOD PRESSURE: 81 MMHG | HEART RATE: 60 BPM | OXYGEN SATURATION: 95 % | TEMPERATURE: 98.1 F | WEIGHT: 256 LBS | SYSTOLIC BLOOD PRESSURE: 136 MMHG | BODY MASS INDEX: 31.83 KG/M2 | RESPIRATION RATE: 20 BRPM | HEIGHT: 75 IN

## 2018-08-27 DIAGNOSIS — R31.0 GROSS HEMATURIA: Primary | ICD-10-CM

## 2018-08-27 DIAGNOSIS — R10.9 FLANK PAIN: ICD-10-CM

## 2018-08-27 LAB
ANION GAP SERPL CALCULATED.3IONS-SCNC: 9 MMOL/L (ref 7–19)
APTT: 28.6 SEC (ref 26–36.2)
BASOPHILS ABSOLUTE: 0 K/UL (ref 0–0.2)
BASOPHILS RELATIVE PERCENT: 0.4 % (ref 0–1)
BILIRUBIN URINE: NEGATIVE
BLOOD, URINE: ABNORMAL
BUN BLDV-MCNC: 23 MG/DL (ref 8–23)
CALCIUM SERPL-MCNC: 9.1 MG/DL (ref 8.8–10.2)
CHLORIDE BLD-SCNC: 104 MMOL/L (ref 98–111)
CLARITY: ABNORMAL
CO2: 28 MMOL/L (ref 22–29)
COLOR: ABNORMAL
CREAT SERPL-MCNC: 1.3 MG/DL (ref 0.5–1.2)
EOSINOPHILS ABSOLUTE: 0.2 K/UL (ref 0–0.6)
EOSINOPHILS RELATIVE PERCENT: 2.9 % (ref 0–5)
GFR NON-AFRICAN AMERICAN: 55
GLUCOSE BLD-MCNC: 119 MG/DL (ref 74–109)
GLUCOSE URINE: 100 MG/DL
HCT VFR BLD CALC: 44.4 % (ref 42–52)
HEMOGLOBIN: 14 G/DL (ref 14–18)
INR BLD: 1 (ref 0.88–1.18)
KETONES, URINE: NEGATIVE MG/DL
LEUKOCYTE ESTERASE, URINE: ABNORMAL
LYMPHOCYTES ABSOLUTE: 1.5 K/UL (ref 1.1–4.5)
LYMPHOCYTES RELATIVE PERCENT: 20.4 % (ref 20–40)
MCH RBC QN AUTO: 27 PG (ref 27–31)
MCHC RBC AUTO-ENTMCNC: 31.5 G/DL (ref 33–37)
MCV RBC AUTO: 85.7 FL (ref 80–94)
MONOCYTES ABSOLUTE: 0.6 K/UL (ref 0–0.9)
MONOCYTES RELATIVE PERCENT: 7.7 % (ref 0–10)
NEUTROPHILS ABSOLUTE: 4.9 K/UL (ref 1.5–7.5)
NEUTROPHILS RELATIVE PERCENT: 68.2 % (ref 50–65)
NITRITE, URINE: POSITIVE
PDW BLD-RTO: 13.9 % (ref 11.5–14.5)
PH UA: 6
PLATELET # BLD: 130 K/UL (ref 130–400)
PMV BLD AUTO: 11.9 FL (ref 9.4–12.4)
POTASSIUM SERPL-SCNC: 4.3 MMOL/L (ref 3.5–5)
PROTEIN UA: >=300 MG/DL
PROTHROMBIN TIME: 13.1 SEC (ref 12–14.6)
RBC # BLD: 5.18 M/UL (ref 4.7–6.1)
RBC UA: ABNORMAL /HPF (ref 0–2)
SODIUM BLD-SCNC: 141 MMOL/L (ref 136–145)
SPECIFIC GRAVITY UA: >=1.03
URINE REFLEX TO CULTURE: YES
UROBILINOGEN, URINE: 1 E.U./DL
WBC # BLD: 7.2 K/UL (ref 4.8–10.8)

## 2018-08-27 PROCEDURE — 85025 COMPLETE CBC W/AUTO DIFF WBC: CPT

## 2018-08-27 PROCEDURE — 81001 URINALYSIS AUTO W/SCOPE: CPT

## 2018-08-27 PROCEDURE — 74150 CT ABDOMEN W/O CONTRAST: CPT

## 2018-08-27 PROCEDURE — 85730 THROMBOPLASTIN TIME PARTIAL: CPT

## 2018-08-27 PROCEDURE — 85610 PROTHROMBIN TIME: CPT

## 2018-08-27 PROCEDURE — 2580000003 HC RX 258: Performed by: EMERGENCY MEDICINE

## 2018-08-27 PROCEDURE — 99284 EMERGENCY DEPT VISIT MOD MDM: CPT | Performed by: EMERGENCY MEDICINE

## 2018-08-27 PROCEDURE — 99283 EMERGENCY DEPT VISIT LOW MDM: CPT

## 2018-08-27 PROCEDURE — 80048 BASIC METABOLIC PNL TOTAL CA: CPT

## 2018-08-27 PROCEDURE — 87086 URINE CULTURE/COLONY COUNT: CPT

## 2018-08-27 PROCEDURE — 36415 COLL VENOUS BLD VENIPUNCTURE: CPT

## 2018-08-27 RX ORDER — HYDROCODONE BITARTRATE AND ACETAMINOPHEN 5; 325 MG/1; MG/1
1 TABLET ORAL EVERY 6 HOURS PRN
Status: ON HOLD | COMMUNITY
End: 2018-08-28

## 2018-08-27 RX ORDER — 0.9 % SODIUM CHLORIDE 0.9 %
1000 INTRAVENOUS SOLUTION INTRAVENOUS ONCE
Status: COMPLETED | OUTPATIENT
Start: 2018-08-27 | End: 2018-08-27

## 2018-08-27 RX ADMIN — SODIUM CHLORIDE 1000 ML: 9 INJECTION, SOLUTION INTRAVENOUS at 20:44

## 2018-08-27 ASSESSMENT — ENCOUNTER SYMPTOMS
COUGH: 0
ABDOMINAL DISTENTION: 0
CHEST TIGHTNESS: 0
DIARRHEA: 0
BACK PAIN: 0
NAUSEA: 0
VOMITING: 0
SHORTNESS OF BREATH: 0
TROUBLE SWALLOWING: 0
CONSTIPATION: 0
BLOOD IN STOOL: 0
ABDOMINAL PAIN: 0
SORE THROAT: 0
RHINORRHEA: 0

## 2018-08-27 ASSESSMENT — PAIN DESCRIPTION - PAIN TYPE: TYPE: ACUTE PAIN

## 2018-08-27 ASSESSMENT — PAIN DESCRIPTION - ORIENTATION: ORIENTATION: RIGHT

## 2018-08-27 ASSESSMENT — PAIN SCALES - GENERAL: PAINLEVEL_OUTOF10: 8

## 2018-08-27 ASSESSMENT — PAIN DESCRIPTION - LOCATION: LOCATION: FLANK

## 2018-08-28 ENCOUNTER — ANESTHESIA (OUTPATIENT)
Dept: OPERATING ROOM | Age: 68
End: 2018-08-28
Payer: COMMERCIAL

## 2018-08-28 ENCOUNTER — ANESTHESIA EVENT (OUTPATIENT)
Dept: OPERATING ROOM | Age: 68
End: 2018-08-28
Payer: COMMERCIAL

## 2018-08-28 ENCOUNTER — HOSPITAL ENCOUNTER (OUTPATIENT)
Age: 68
Setting detail: OUTPATIENT SURGERY
Discharge: HOME | End: 2018-08-28
Attending: UROLOGY | Admitting: UROLOGY
Payer: COMMERCIAL

## 2018-08-28 ENCOUNTER — APPOINTMENT (OUTPATIENT)
Dept: GENERAL RADIOLOGY | Age: 68
End: 2018-08-28
Attending: UROLOGY
Payer: COMMERCIAL

## 2018-08-28 VITALS
WEIGHT: 252 LBS | OXYGEN SATURATION: 94 % | BODY MASS INDEX: 31.33 KG/M2 | TEMPERATURE: 98 F | RESPIRATION RATE: 14 BRPM | HEART RATE: 66 BPM | SYSTOLIC BLOOD PRESSURE: 146 MMHG | DIASTOLIC BLOOD PRESSURE: 82 MMHG | HEIGHT: 75 IN

## 2018-08-28 VITALS
RESPIRATION RATE: 23 BRPM | OXYGEN SATURATION: 99 % | DIASTOLIC BLOOD PRESSURE: 85 MMHG | TEMPERATURE: 95.8 F | SYSTOLIC BLOOD PRESSURE: 146 MMHG

## 2018-08-28 DIAGNOSIS — N20.0 RENAL CALCULUS, RIGHT: Primary | ICD-10-CM

## 2018-08-28 DIAGNOSIS — G89.18 POST-OP PAIN: ICD-10-CM

## 2018-08-28 LAB
COLLAGEN EPINEPHRINE TIME: 108 SEC (ref 84–176)
HCT VFR BLD CALC: 43.3 % (ref 42–52)
HEMOGLOBIN: 13.7 G/DL (ref 14–18)
PLATELET FUNCTION INTERPRETATION: NORMAL

## 2018-08-28 PROCEDURE — 50590 FRAGMENTING OF KIDNEY STONE: CPT | Performed by: UROLOGY

## 2018-08-28 PROCEDURE — 85576 BLOOD PLATELET AGGREGATION: CPT

## 2018-08-28 PROCEDURE — 6370000000 HC RX 637 (ALT 250 FOR IP): Performed by: ANESTHESIOLOGY

## 2018-08-28 PROCEDURE — 52310 CYSTOSCOPY AND TREATMENT: CPT | Performed by: UROLOGY

## 2018-08-28 PROCEDURE — 6370000000 HC RX 637 (ALT 250 FOR IP): Performed by: UROLOGY

## 2018-08-28 PROCEDURE — 7100000010 HC PHASE II RECOVERY - FIRST 15 MIN: Performed by: UROLOGY

## 2018-08-28 PROCEDURE — 3700000001 HC ADD 15 MINUTES (ANESTHESIA): Performed by: UROLOGY

## 2018-08-28 PROCEDURE — 7100000001 HC PACU RECOVERY - ADDTL 15 MIN: Performed by: UROLOGY

## 2018-08-28 PROCEDURE — 2580000003 HC RX 258: Performed by: ANESTHESIOLOGY

## 2018-08-28 PROCEDURE — 6360000002 HC RX W HCPCS: Performed by: ANESTHESIOLOGY

## 2018-08-28 PROCEDURE — 7100000000 HC PACU RECOVERY - FIRST 15 MIN: Performed by: UROLOGY

## 2018-08-28 PROCEDURE — 3700000000 HC ANESTHESIA ATTENDED CARE: Performed by: UROLOGY

## 2018-08-28 PROCEDURE — 85018 HEMOGLOBIN: CPT

## 2018-08-28 PROCEDURE — 74018 RADEX ABDOMEN 1 VIEW: CPT

## 2018-08-28 PROCEDURE — 2500000003 HC RX 250 WO HCPCS: Performed by: NURSE ANESTHETIST, CERTIFIED REGISTERED

## 2018-08-28 PROCEDURE — 36415 COLL VENOUS BLD VENIPUNCTURE: CPT

## 2018-08-28 PROCEDURE — 3600000004 HC SURGERY LEVEL 4 BASE: Performed by: UROLOGY

## 2018-08-28 PROCEDURE — 85014 HEMATOCRIT: CPT

## 2018-08-28 PROCEDURE — 2709999900 HC NON-CHARGEABLE SUPPLY: Performed by: UROLOGY

## 2018-08-28 PROCEDURE — 3600000014 HC SURGERY LEVEL 4 ADDTL 15MIN: Performed by: UROLOGY

## 2018-08-28 PROCEDURE — 6360000002 HC RX W HCPCS: Performed by: NURSE ANESTHETIST, CERTIFIED REGISTERED

## 2018-08-28 PROCEDURE — 7100000011 HC PHASE II RECOVERY - ADDTL 15 MIN: Performed by: UROLOGY

## 2018-08-28 RX ORDER — MIDAZOLAM HYDROCHLORIDE 1 MG/ML
2 INJECTION INTRAMUSCULAR; INTRAVENOUS
Status: COMPLETED | OUTPATIENT
Start: 2018-08-28 | End: 2018-08-28

## 2018-08-28 RX ORDER — FENTANYL CITRATE 50 UG/ML
50 INJECTION, SOLUTION INTRAMUSCULAR; INTRAVENOUS
Status: DISCONTINUED | OUTPATIENT
Start: 2018-08-28 | End: 2018-08-28 | Stop reason: HOSPADM

## 2018-08-28 RX ORDER — SODIUM CHLORIDE 0.9 % (FLUSH) 0.9 %
10 SYRINGE (ML) INJECTION EVERY 12 HOURS SCHEDULED
Status: DISCONTINUED | OUTPATIENT
Start: 2018-08-28 | End: 2018-08-28 | Stop reason: HOSPADM

## 2018-08-28 RX ORDER — MORPHINE SULFATE 1 MG/ML
4 INJECTION, SOLUTION EPIDURAL; INTRATHECAL; INTRAVENOUS EVERY 5 MIN PRN
Status: DISCONTINUED | OUTPATIENT
Start: 2018-08-28 | End: 2018-08-28 | Stop reason: HOSPADM

## 2018-08-28 RX ORDER — SODIUM CHLORIDE 0.9 % (FLUSH) 0.9 %
10 SYRINGE (ML) INJECTION PRN
Status: DISCONTINUED | OUTPATIENT
Start: 2018-08-28 | End: 2018-08-28 | Stop reason: HOSPADM

## 2018-08-28 RX ORDER — MORPHINE SULFATE 4 MG/ML
4 INJECTION, SOLUTION INTRAMUSCULAR; INTRAVENOUS
Status: DISCONTINUED | OUTPATIENT
Start: 2018-08-28 | End: 2018-08-28 | Stop reason: HOSPADM

## 2018-08-28 RX ORDER — ONDANSETRON 2 MG/ML
INJECTION INTRAMUSCULAR; INTRAVENOUS PRN
Status: DISCONTINUED | OUTPATIENT
Start: 2018-08-28 | End: 2018-08-28 | Stop reason: SDUPTHER

## 2018-08-28 RX ORDER — LABETALOL HYDROCHLORIDE 5 MG/ML
5 INJECTION, SOLUTION INTRAVENOUS EVERY 10 MIN PRN
Status: DISCONTINUED | OUTPATIENT
Start: 2018-08-28 | End: 2018-08-28 | Stop reason: HOSPADM

## 2018-08-28 RX ORDER — MORPHINE SULFATE 1 MG/ML
2 INJECTION, SOLUTION EPIDURAL; INTRATHECAL; INTRAVENOUS EVERY 5 MIN PRN
Status: DISCONTINUED | OUTPATIENT
Start: 2018-08-28 | End: 2018-08-28 | Stop reason: HOSPADM

## 2018-08-28 RX ORDER — HYDROMORPHONE HCL IN 0.9% NACL 0.5 MG/ML
1 SYRINGE (ML) INTRAVENOUS
Status: DISCONTINUED | OUTPATIENT
Start: 2018-08-28 | End: 2018-08-28 | Stop reason: HOSPADM

## 2018-08-28 RX ORDER — OXYCODONE HYDROCHLORIDE AND ACETAMINOPHEN 5; 325 MG/1; MG/1
2 TABLET ORAL EVERY 4 HOURS PRN
Status: DISCONTINUED | OUTPATIENT
Start: 2018-08-28 | End: 2018-08-28 | Stop reason: HOSPADM

## 2018-08-28 RX ORDER — LIDOCAINE HYDROCHLORIDE 10 MG/ML
1 INJECTION, SOLUTION EPIDURAL; INFILTRATION; INTRACAUDAL; PERINEURAL
Status: DISCONTINUED | OUTPATIENT
Start: 2018-08-28 | End: 2018-08-28 | Stop reason: HOSPADM

## 2018-08-28 RX ORDER — WARFARIN SODIUM 5 MG/1
5 TABLET ORAL
Status: ON HOLD | COMMUNITY
End: 2018-08-28

## 2018-08-28 RX ORDER — HYDRALAZINE HYDROCHLORIDE 20 MG/ML
5 INJECTION INTRAMUSCULAR; INTRAVENOUS EVERY 10 MIN PRN
Status: DISCONTINUED | OUTPATIENT
Start: 2018-08-28 | End: 2018-08-28 | Stop reason: HOSPADM

## 2018-08-28 RX ORDER — EPHEDRINE SULFATE 50 MG/ML
INJECTION, SOLUTION INTRAVENOUS PRN
Status: DISCONTINUED | OUTPATIENT
Start: 2018-08-28 | End: 2018-08-28 | Stop reason: SDUPTHER

## 2018-08-28 RX ORDER — FENTANYL CITRATE 50 UG/ML
INJECTION, SOLUTION INTRAMUSCULAR; INTRAVENOUS PRN
Status: DISCONTINUED | OUTPATIENT
Start: 2018-08-28 | End: 2018-08-28 | Stop reason: SDUPTHER

## 2018-08-28 RX ORDER — PROPOFOL 10 MG/ML
INJECTION, EMULSION INTRAVENOUS PRN
Status: DISCONTINUED | OUTPATIENT
Start: 2018-08-28 | End: 2018-08-28 | Stop reason: SDUPTHER

## 2018-08-28 RX ORDER — HYDROMORPHONE HCL IN 0.9% NACL 0.5 MG/ML
0.5 SYRINGE (ML) INTRAVENOUS EVERY 5 MIN PRN
Status: DISCONTINUED | OUTPATIENT
Start: 2018-08-28 | End: 2018-08-28 | Stop reason: HOSPADM

## 2018-08-28 RX ORDER — MEPERIDINE HYDROCHLORIDE 50 MG/ML
12.5 INJECTION INTRAMUSCULAR; INTRAVENOUS; SUBCUTANEOUS EVERY 5 MIN PRN
Status: DISCONTINUED | OUTPATIENT
Start: 2018-08-28 | End: 2018-08-28 | Stop reason: HOSPADM

## 2018-08-28 RX ORDER — GINSENG 100 MG
CAPSULE ORAL PRN
Status: DISCONTINUED | OUTPATIENT
Start: 2018-08-28 | End: 2018-08-28 | Stop reason: HOSPADM

## 2018-08-28 RX ORDER — SCOLOPAMINE TRANSDERMAL SYSTEM 1 MG/1
1 PATCH, EXTENDED RELEASE TRANSDERMAL ONCE
Status: DISCONTINUED | OUTPATIENT
Start: 2018-08-28 | End: 2018-08-28 | Stop reason: HOSPADM

## 2018-08-28 RX ORDER — CIPROFLOXACIN 2 MG/ML
INJECTION, SOLUTION INTRAVENOUS PRN
Status: DISCONTINUED | OUTPATIENT
Start: 2018-08-28 | End: 2018-08-28 | Stop reason: SDUPTHER

## 2018-08-28 RX ORDER — ROCURONIUM BROMIDE 10 MG/ML
INJECTION, SOLUTION INTRAVENOUS PRN
Status: DISCONTINUED | OUTPATIENT
Start: 2018-08-28 | End: 2018-08-28 | Stop reason: SDUPTHER

## 2018-08-28 RX ORDER — SODIUM CHLORIDE, SODIUM LACTATE, POTASSIUM CHLORIDE, CALCIUM CHLORIDE 600; 310; 30; 20 MG/100ML; MG/100ML; MG/100ML; MG/100ML
INJECTION, SOLUTION INTRAVENOUS CONTINUOUS
Status: DISCONTINUED | OUTPATIENT
Start: 2018-08-28 | End: 2018-08-28 | Stop reason: HOSPADM

## 2018-08-28 RX ORDER — LIDOCAINE HYDROCHLORIDE 10 MG/ML
INJECTION, SOLUTION EPIDURAL; INFILTRATION; INTRACAUDAL; PERINEURAL PRN
Status: DISCONTINUED | OUTPATIENT
Start: 2018-08-28 | End: 2018-08-28 | Stop reason: SDUPTHER

## 2018-08-28 RX ORDER — HYDROMORPHONE HCL IN 0.9% NACL 0.5 MG/ML
0.25 SYRINGE (ML) INTRAVENOUS EVERY 5 MIN PRN
Status: DISCONTINUED | OUTPATIENT
Start: 2018-08-28 | End: 2018-08-28 | Stop reason: HOSPADM

## 2018-08-28 RX ORDER — SODIUM CHLORIDE 9 MG/ML
INJECTION, SOLUTION INTRAVENOUS CONTINUOUS
Status: DISCONTINUED | OUTPATIENT
Start: 2018-08-28 | End: 2018-08-28 | Stop reason: HOSPADM

## 2018-08-28 RX ORDER — METOCLOPRAMIDE HYDROCHLORIDE 5 MG/ML
10 INJECTION INTRAMUSCULAR; INTRAVENOUS
Status: DISCONTINUED | OUTPATIENT
Start: 2018-08-28 | End: 2018-08-28 | Stop reason: HOSPADM

## 2018-08-28 RX ORDER — DEXAMETHASONE SODIUM PHOSPHATE 4 MG/ML
INJECTION, SOLUTION INTRA-ARTICULAR; INTRALESIONAL; INTRAMUSCULAR; INTRAVENOUS; SOFT TISSUE PRN
Status: DISCONTINUED | OUTPATIENT
Start: 2018-08-28 | End: 2018-08-28 | Stop reason: SDUPTHER

## 2018-08-28 RX ORDER — HYDROCODONE BITARTRATE AND ACETAMINOPHEN 5; 325 MG/1; MG/1
1 TABLET ORAL EVERY 6 HOURS PRN
Qty: 30 TABLET | Refills: 0 | Status: SHIPPED | OUTPATIENT
Start: 2018-08-28 | End: 2018-09-04

## 2018-08-28 RX ORDER — PROMETHAZINE HYDROCHLORIDE 25 MG/ML
6.25 INJECTION, SOLUTION INTRAMUSCULAR; INTRAVENOUS
Status: DISCONTINUED | OUTPATIENT
Start: 2018-08-28 | End: 2018-08-28 | Stop reason: HOSPADM

## 2018-08-28 RX ORDER — ONDANSETRON 2 MG/ML
4 INJECTION INTRAMUSCULAR; INTRAVENOUS EVERY 4 HOURS PRN
Status: DISCONTINUED | OUTPATIENT
Start: 2018-08-28 | End: 2018-08-28 | Stop reason: HOSPADM

## 2018-08-28 RX ORDER — TAMSULOSIN HYDROCHLORIDE 0.4 MG/1
0.4 CAPSULE ORAL ONCE
Status: COMPLETED | OUTPATIENT
Start: 2018-08-28 | End: 2018-08-28

## 2018-08-28 RX ORDER — WARFARIN SODIUM 5 MG/1
5 TABLET ORAL DAILY
Qty: 30 TABLET | Refills: 3 | Status: SHIPPED | OUTPATIENT
Start: 2018-08-28 | End: 2019-03-05 | Stop reason: ALTCHOICE

## 2018-08-28 RX ORDER — DIPHENHYDRAMINE HYDROCHLORIDE 50 MG/ML
12.5 INJECTION INTRAMUSCULAR; INTRAVENOUS
Status: DISCONTINUED | OUTPATIENT
Start: 2018-08-28 | End: 2018-08-28 | Stop reason: HOSPADM

## 2018-08-28 RX ADMIN — EPHEDRINE SULFATE 10 MG: 50 INJECTION, SOLUTION INTRAMUSCULAR; INTRAVENOUS; SUBCUTANEOUS at 11:42

## 2018-08-28 RX ADMIN — SUGAMMADEX 230 MG: 100 INJECTION, SOLUTION INTRAVENOUS at 12:22

## 2018-08-28 RX ADMIN — PROPOFOL 120 MG: 10 INJECTION, EMULSION INTRAVENOUS at 11:18

## 2018-08-28 RX ADMIN — EPHEDRINE SULFATE 10 MG: 50 INJECTION, SOLUTION INTRAMUSCULAR; INTRAVENOUS; SUBCUTANEOUS at 11:30

## 2018-08-28 RX ADMIN — SODIUM CHLORIDE, SODIUM LACTATE, POTASSIUM CHLORIDE, AND CALCIUM CHLORIDE: 600; 310; 30; 20 INJECTION, SOLUTION INTRAVENOUS at 12:22

## 2018-08-28 RX ADMIN — DEXAMETHASONE SODIUM PHOSPHATE 4 MG: 4 INJECTION, SOLUTION INTRAMUSCULAR; INTRAVENOUS at 11:32

## 2018-08-28 RX ADMIN — SODIUM CHLORIDE, SODIUM LACTATE, POTASSIUM CHLORIDE, AND CALCIUM CHLORIDE: 600; 310; 30; 20 INJECTION, SOLUTION INTRAVENOUS at 10:14

## 2018-08-28 RX ADMIN — CIPROFLOXACIN 400 MG: 2 INJECTION INTRAVENOUS at 11:31

## 2018-08-28 RX ADMIN — ROCURONIUM BROMIDE 50 MG: 10 INJECTION INTRAVENOUS at 11:18

## 2018-08-28 RX ADMIN — TAMSULOSIN HYDROCHLORIDE 0.4 MG: 0.4 CAPSULE ORAL at 13:09

## 2018-08-28 RX ADMIN — LIDOCAINE HYDROCHLORIDE 5 ML: 10 INJECTION, SOLUTION EPIDURAL; INFILTRATION; INTRACAUDAL; PERINEURAL at 11:18

## 2018-08-28 RX ADMIN — ONDANSETRON HYDROCHLORIDE 4 MG: 2 INJECTION, SOLUTION INTRAMUSCULAR; INTRAVENOUS at 11:32

## 2018-08-28 RX ADMIN — MIDAZOLAM 2 MG: 1 INJECTION INTRAMUSCULAR; INTRAVENOUS at 11:09

## 2018-08-28 RX ADMIN — PHENYLEPHRINE HYDROCHLORIDE 80 MCG: 10 INJECTION INTRAVENOUS at 11:30

## 2018-08-28 RX ADMIN — FENTANYL CITRATE 50 MCG: 50 INJECTION INTRAMUSCULAR; INTRAVENOUS at 11:18

## 2018-08-28 ASSESSMENT — LIFESTYLE VARIABLES: SMOKING_STATUS: 0

## 2018-08-28 ASSESSMENT — PAIN DESCRIPTION - DESCRIPTORS: DESCRIPTORS: SORE

## 2018-08-28 ASSESSMENT — ENCOUNTER SYMPTOMS: SHORTNESS OF BREATH: 0

## 2018-08-28 ASSESSMENT — PAIN SCALES - GENERAL: PAINLEVEL_OUTOF10: 2

## 2018-08-28 ASSESSMENT — PAIN DESCRIPTION - LOCATION: LOCATION: ABDOMEN

## 2018-08-28 ASSESSMENT — PAIN DESCRIPTION - PAIN TYPE: TYPE: SURGICAL PAIN

## 2018-08-28 NOTE — ANESTHESIA PRE PROCEDURE
Smoking status: Former Smoker     Packs/day: 3.00     Years: 35.00    Smokeless tobacco: Former User     Quit date: 4/27/2001    Alcohol use No                                Counseling given: Not Answered      Vital Signs (Current):   Vitals:    08/28/18 0951   BP: (!) 152/95   Pulse: 89   Resp: 18   Temp: 98 °F (36.7 °C)   TempSrc: Temporal   SpO2: 98%   Weight: 252 lb (114.3 kg)   Height: 6' 3\" (1.905 m)                                              BP Readings from Last 3 Encounters:   08/28/18 (!) 152/95   08/27/18 136/81   08/21/18 (!) 153/83       NPO Status:                                                                                 BMI:   Wt Readings from Last 3 Encounters:   08/28/18 252 lb (114.3 kg)   08/27/18 256 lb (116.1 kg)   08/20/18 252 lb (114.3 kg)     Body mass index is 31.5 kg/m². CBC:   Lab Results   Component Value Date    WBC 7.2 08/27/2018    RBC 5.18 08/27/2018    HGB 14.0 08/27/2018    HCT 44.4 08/27/2018    MCV 85.7 08/27/2018    RDW 13.9 08/27/2018     08/27/2018       CMP:   Lab Results   Component Value Date     08/27/2018    K 4.3 08/27/2018     08/27/2018    CO2 28 08/27/2018    BUN 23 08/27/2018    CREATININE 1.3 08/27/2018    LABGLOM 55 08/27/2018    GLUCOSE 119 08/27/2018    PROT 7.4 07/26/2018    CALCIUM 9.1 08/27/2018    BILITOT 0.4 07/26/2018    ALKPHOS 71 07/26/2018    AST 21 07/26/2018    ALT 25 07/26/2018       POC Tests: No results for input(s): POCGLU, POCNA, POCK, POCCL, POCBUN, POCHEMO, POCHCT in the last 72 hours.     Coags:   Lab Results   Component Value Date    PROTIME 13.1 08/27/2018    INR 1.00 08/27/2018    APTT 28.6 08/27/2018       HCG (If Applicable): No results found for: PREGTESTUR, PREGSERUM, HCG, HCGQUANT     ABGs: No results found for: PHART, PO2ART, BWS5VPI, YPH0SHT, BEART, D7WBNGEX     Type & Screen (If Applicable):  No results found for: LABABO, [address]    Anesthesia Evaluation  Patient summary reviewed and Nursing notes reviewed no history of anesthetic complications:   Airway: Mallampati: II  TM distance: >3 FB   Neck ROM: full  Mouth opening: > = 3 FB Dental: normal exam         Pulmonary:Negative Pulmonary ROS and normal exam  breath sounds clear to auscultation      (-) shortness of breath and not a current smoker          Patient did not smoke on day of surgery. Cardiovascular:    (+) hypertension: moderate, past MI: > 6 months, CAD:, CABG/stent (1 stent 4/2016):,     (-)  angina and  CHF    NYHA Classification: I  ECG reviewed  Rhythm: regular  Rate: normal           Beta Blocker:  Not on Beta Blocker         Neuro/Psych:   Negative Neuro/Psych ROS  (+) CVA (1999):,    (-) seizures and depression/anxiety            GI/Hepatic/Renal: Neg GI/Hepatic/Renal ROS  (+) renal disease: kidney stones, morbid obesity     (-) hiatal hernia and GERD       Endo/Other: Negative Endo/Other ROS   (+) blood dyscrasia: bridge therapy and anticoagulation therapy:., .          Pt had PAT visit. Abdominal:   (+) obese,     Abdomen: soft. Vascular:                                        Anesthesia Plan      general     ASA 3     (Iv zofran within 30 min of closing )  Induction: intravenous. BIS  MIPS: Postoperative opioids intended and Prophylactic antiemetics administered. Anesthetic plan and risks discussed with patient. Use of blood products discussed with patient whom. Plan discussed with CRNA.     Attending anesthesiologist reviewed and agrees with Pre Eval content              Joe Sol MD   8/28/2018

## 2018-08-28 NOTE — OP NOTE
[facility] [address] [facility]                                 OPERATIVE REPORT    PATIENT NAME: ALLIE TAYLOR                       :        1950  MED REC NO:   [id number]                              ROOM:  ACCOUNT NO:   [de-identified]                           ADMIT DATE: 2018  PROVIDER:     Norberto Arcos MD    DATE OF OPERATION:  2018    TITLE OF OPERATION:  1. Right renal ESWL. 2.  Cystoscopy, removal of right ureteral stent. PREOPERATIVE DIAGNOSIS:  Right renal calculus. POSTOPERATIVE DIAGNOSIS:  Right renal calculus. ANESTHETIC:  General anesthetic. ATTENDING SURGEON:  Norberto Arcos MD    HISTORY:  The patient is a 63-year-old gentleman, who was treated  approximately 1 week ago, for right ureteral calculus. A stent was left in  place after this procedure. He has a known stone in the right renal pelvis  and he now presents to undergo treatment of the separate distinct right  renal stone with right ESWL. If the stone fragments well, we will remove  his stent. Risks and complications were discussed with him. He agrees to  proceed. DESCRIPTION OF PROCEDURE:  The patient was brought to the operating room,  underwent general anesthetic. He was placed on the [employer] lithotripsy unit  in supine position, underwent general anesthetic. He was then positioned  for right renal ESWL. The stone was clearly seen. The stone was localized  in the focal point. He received 2000 shock waves with power level up to 7  and the stone was no longer visible. There was a second stone up in the  midportion that was smaller. We focused on this and gave the last 1000  shock waves to this stone, which also did appear to disappear. The patient's genitalia was then prepped and draped and a flexible  cystoscope was inserted in the meatus. This was advanced under direct  vision into the patient's bladder.   I can see the stent protruding from the  right ureteral orifice. This was grasped with a grasper and then the stent  was extracted. He had just recently had a dorsal slit of his foreskin, so  I dressed this with some antibiotic ointment and some Baron dressing around  it. He was then awakened from the anesthetic and taken to recovery room in  stable condition. He will follow up in 2 weeks with LETICIA.           Linn Morris MD    D: 08/28/2018 13:56:36      T: 08/28/2018 13:58:33     [email or web address]  Job#: [id number]     Doc#: [id number]    CC:

## 2018-08-28 NOTE — H&P (VIEW-ONLY)
Family History   Problem Relation Age of Onset    Cancer Sister      Cancer Brother      Heart Disease Father                    Social History   Substance Use Topics    Smoking status: Former Smoker       Packs/day: 3.00       Years: 35.00    Smokeless tobacco: Former User       Quit date: 4/27/2001    Alcohol use No      Current Facility-Administered Medications   No current facility-administered medications for this visit.       No current outpatient prescriptions on file.                Facility-Administered Medications Ordered in Other Visits   Medication Dose Route Frequency Provider Last Rate Last Dose    ciprofloxacin (CIPRO) IVPB 400 mg  400 mg Intravenous Once Yumiko Bentley MD                   Allergies   Allergen Reactions    Codeine Other (See Comments)       Describes it as a sensitivity.           Health Maintenance   Topic Date Due    Hepatitis C screen  1950    DTaP/Tdap/Td vaccine (1 - Tdap) 05/24/1971    Shingles Vaccine (1 of 2 - 2 Dose Series) 05/24/2002    Colon cancer screen colonoscopy  05/24/2002    Pneumococcal low/med risk (1 of 2 - PCV13) 05/24/2017    Flu vaccine (1) 09/01/2018    Potassium monitoring  08/20/2019    Creatinine monitoring  08/20/2019    Lipid screen  04/27/2021    AAA screen  Completed         Subjective:      Review of Systems   Constitutional: Negative for chills and fever. Respiratory: Negative for cough and shortness of breath. Cardiovascular: Negative for chest pain, palpitations and leg swelling. Genitourinary: Positive for difficulty urinating, flank pain and penile pain. Negative for dysuria, frequency, genital sores, hematuria, testicular pain and urgency. Neurological: Negative for light-headedness. All other systems reviewed and are negative.        Objective:      Physical Exam   Constitutional: He is oriented to person, place, and time. He appears well-developed and well-nourished. No distress.    HENT:   Head:

## 2018-08-28 NOTE — INTERVAL H&P NOTE
H&P updated.  The patient was examined and andHe is now s/p dorsal slit for phimosis, he had right URS for ureteral stone and stent done one week ago, he has large right renal stone and is now to undergo right renal ESWL and if fragments well will need stent removal

## 2018-08-28 NOTE — ED PROVIDER NOTES
dictation. EMERGENCY DEPARTMENT COURSE and DIFFERENTIAL DIAGNOSIS/MDM:   Vitals:    Vitals:    08/27/18 1950   BP: 136/81   Pulse: 60   Resp: 20   Temp: 98.1 °F (36.7 °C)   TempSrc: Temporal   SpO2: 95%   Weight: 256 lb (116.1 kg)   Height: 6' 3\" (1.905 m)       MDM  Number of Diagnoses or Management Options  Diagnosis management comments: 57-year-old male history of kidney stones, under the care of Dr. Pretty Jordan presenting with worsening pain and hematuria. Plan for CT, pain control as needed, patient currently declining an initial evaluation, IV fluids, urinalysis. ED Course  Patient was evaluated for his hematuria, due to gross hematuria. Recently had phimosis with a dorsal slit and ureteral stent placed. Workup shows potential ureteritis last stent in place. No significant signs of infection. Case was discussed with Dr. Pretty Jordan, patient's urologist, feel that there needs to be any acute intervention and probably patient's symptoms and presentation are consistent with recent stenting. Patient is afebrile does not show any signs of sepsis. The patient does have lithotripsy scheduled for tomorrow 11 AM. Patient's pain is adequately controlled. He has not requested any pain medication in the emergency department. Repeat exam benign. Return precautions reviewed prior to discharge. Patient is well-appearing, stable for discharge and follow-up without fail with his/her medical doctor. I had a detailed discussion with the patient and/or guardian regarding the historical points, exam findings, and any diagnostic results supporting the discharge diagnosis. The patient was educated on care and need for follow-up. Strict return instructions including red flag signs and symptoms were discussed with the patient. Medications for discharge discussed, and adverse effects reviewed. Questions invited and answered. Patient shows understanding of the discharge information and agrees to follow-up.       CONSULTS:  IP CONSULT TO UROLOGY    PROCEDURES:  Unless otherwise noted below, none     Procedures    FINAL IMPRESSION      1. Gross hematuria    2.  Flank pain          DISPOSITION/PLAN   DISPOSITION Decision To Discharge 08/27/2018 10:28:55 PM      PATIENT REFERRED TO:  Elijah Graf MD  [address] [place] [address], [address] [place] [address] [phone]    Go to   Your surgery at 6 AM on August 28, 2018      DISCHARGE MEDICATIONS:  Current Discharge Medication List             (Please note that portions of this note were completed with a voice recognition program.  Efforts were made to edit the dictations but occasionally words are mis-transcribed.)    Riki Miguel MD (electronically signed)  Attending Emergency Physician          Riki Miguel MD  08/27/18 2723

## 2018-08-28 NOTE — ED NOTES
DC home with wife; no scripts; to come back tomorrow for surgery scheduled for 1100; reminded to be npo after midnight; ambulatory; dc papers given     Gaither Kayser, RN  08/27/18 3780

## 2018-08-29 LAB — URINE CULTURE, ROUTINE: NORMAL

## 2018-09-11 ENCOUNTER — OFFICE VISIT (OUTPATIENT)
Dept: UROLOGY | Age: 68
End: 2018-09-11

## 2018-09-11 ENCOUNTER — HOSPITAL ENCOUNTER (OUTPATIENT)
Dept: GENERAL RADIOLOGY | Age: 68
Discharge: HOME | End: 2018-09-11
Payer: COMMERCIAL

## 2018-09-11 DIAGNOSIS — N20.0 LEFT NEPHROLITHIASIS: ICD-10-CM

## 2018-09-11 DIAGNOSIS — N20.1 RIGHT URETERAL CALCULUS: Primary | ICD-10-CM

## 2018-09-11 DIAGNOSIS — N20.0 RENAL CALCULUS, RIGHT: ICD-10-CM

## 2018-09-11 PROCEDURE — 99024 POSTOP FOLLOW-UP VISIT: CPT | Performed by: NURSE PRACTITIONER

## 2018-09-11 PROCEDURE — 74018 RADEX ABDOMEN 1 VIEW: CPT

## 2018-09-11 ASSESSMENT — ENCOUNTER SYMPTOMS: SHORTNESS OF BREATH: 0

## 2019-03-05 ENCOUNTER — OFFICE VISIT (OUTPATIENT)
Dept: UROLOGY | Age: 69
End: 2019-03-05
Payer: COMMERCIAL

## 2019-03-05 VITALS — TEMPERATURE: 98.4 F | HEIGHT: 75 IN | BODY MASS INDEX: 33.45 KG/M2 | WEIGHT: 269 LBS

## 2019-03-05 DIAGNOSIS — N48.9 PENILE LESION: Primary | ICD-10-CM

## 2019-03-05 PROCEDURE — 99214 OFFICE O/P EST MOD 30 MIN: CPT | Performed by: PHYSICIAN ASSISTANT

## 2019-03-05 RX ORDER — CEPHALEXIN 500 MG/1
500 CAPSULE ORAL 3 TIMES DAILY
Qty: 21 CAPSULE | Refills: 0 | Status: SHIPPED | OUTPATIENT
Start: 2019-03-05 | End: 2019-03-12

## 2019-03-05 RX ORDER — WARFARIN SODIUM 1 MG/1
5 TABLET ORAL DAILY
COMMUNITY

## 2019-03-05 RX ORDER — CLOTRIMAZOLE AND BETAMETHASONE DIPROPIONATE 10; .64 MG/G; MG/G
CREAM TOPICAL
Qty: 1 TUBE | Refills: 1 | Status: SHIPPED | OUTPATIENT
Start: 2019-03-05 | End: 2022-03-15

## 2019-03-05 ASSESSMENT — ENCOUNTER SYMPTOMS
EYE DISCHARGE: 0
CONSTIPATION: 0
VOMITING: 0
CHOKING: 0
WHEEZING: 0

## 2021-06-25 DIAGNOSIS — N20.0 KIDNEY STONE: Primary | ICD-10-CM

## 2021-06-28 ENCOUNTER — OFFICE VISIT (OUTPATIENT)
Dept: UROLOGY | Age: 71
End: 2021-06-28
Payer: MEDICARE

## 2021-06-28 VITALS — BODY MASS INDEX: 34.82 KG/M2 | WEIGHT: 280 LBS | HEIGHT: 75 IN | TEMPERATURE: 98.8 F

## 2021-06-28 DIAGNOSIS — N39.0 RECURRENT UTI: Primary | ICD-10-CM

## 2021-06-28 DIAGNOSIS — I71.40 ABDOMINAL AORTIC ANEURYSM (AAA) WITHOUT RUPTURE: ICD-10-CM

## 2021-06-28 DIAGNOSIS — N20.0 BILATERAL KIDNEY STONES: ICD-10-CM

## 2021-06-28 LAB
APPEARANCE FLUID: CLEAR
BILIRUBIN, POC: NORMAL
BLOOD URINE, POC: NORMAL
CLARITY, POC: CLEAR
COLOR, POC: YELLOW
GLUCOSE URINE, POC: NORMAL
KETONES, POC: NORMAL
LEUKOCYTE EST, POC: NORMAL
NITRITE, POC: NORMAL
PH, POC: 6.5
PROTEIN, POC: NORMAL
SPECIFIC GRAVITY, POC: 1.02
UROBILINOGEN, POC: 0.2

## 2021-06-28 PROCEDURE — 51798 US URINE CAPACITY MEASURE: CPT | Performed by: NURSE PRACTITIONER

## 2021-06-28 PROCEDURE — 99215 OFFICE O/P EST HI 40 MIN: CPT | Performed by: NURSE PRACTITIONER

## 2021-06-28 PROCEDURE — 81002 URINALYSIS NONAUTO W/O SCOPE: CPT | Performed by: NURSE PRACTITIONER

## 2021-06-28 RX ORDER — DILTIAZEM HYDROCHLORIDE 120 MG/1
CAPSULE, EXTENDED RELEASE ORAL
COMMUNITY
Start: 2021-04-14

## 2021-06-28 RX ORDER — AMOXICILLIN AND CLAVULANATE POTASSIUM 875; 125 MG/1; MG/1
TABLET, FILM COATED ORAL
COMMUNITY
Start: 2021-06-21 | End: 2021-08-26 | Stop reason: ALTCHOICE

## 2021-06-28 NOTE — PROGRESS NOTES
Dm Becker is a 73 y.o., male, Established patient who presents today   Chief Complaint   Patient presents with    Dysuria     w/ flank pain. patient got KUB        HPI   Patient presents for follow-up of recurrent/persistent urinary tract infection over the last month. He originally presented to the [facility] emergency room on [id number] with generalized aches, chills, fever, abdominal pain, dysuria, flank pain. He also has a history of stones. CT performed at that time revealed bladder wall thickening, nonobstructive bilateral renal calculi as well as bilateral renal cysts. He was offered admission to the hospital at that time, but declined and wished to return home with outpatient antibiotics, ciprofloxacin. He presented back to the emergency room on 5/23/2021 with the same complaints and previous culture results revealed E. coli resistant to ciprofloxacin. He again refused hospitalization and was sent home with Macrobid (or Doxycycline, conflicting data in medical record). He returned to the ER again again on 5/28/2021 with continued complaints of fevers, chills, pelvic pressure, nausea,  although he did note some  improvement of his urinary symptoms. At this point, he was admitted for observation, treated with Merrem, and discharged. He returned to the emergency room on 6/18/2021 with complaints of dysuria, urinary hesitancy, right-sided flank pain, fevers, chills which had recurred the day prior. Once again, he was admitted for observation. It was noted patient was not having episodes of urinary retention, prostatitis, or bladder outlet obstruction. It was thought at that point he may have developed a fistula. CT of the abdomen and pelvis without contrast was completed on 6/18/2021 and did not reveal any significant differences from his previous CT in May. Another CT scan was performed on 6/21/2021 without evidence of pyelonephritis or fistula.   At this point, they decided to refer him back to our office for further evaluation. He reports he is still taking antibiotics prescribed from his most recent hospital admission. Patient reports being asymptomatic today. Urine cultures reveal E. coli with susceptibility profile consistent with ESBL on 5/18/2021, then again on 5/31/2021. No other urine culture were sent by facility. Patient has had no prior history of recurrent UTIs, released treatment from our office. We do follow him for nephrolithiasis. His last surgical intervention was August 2018 when he underwent cystoscopy, right ureteroscopy, laser lithotripsy and stent placement for right ureteral calculus and then brought back on 8/28/2018 for right renal ESWL. A cystoscopy was completed at that time which did not reveal any significant abnormalities. He also underwent dorsal slit at that time. REVIEW OF SYSTEMS:  Review of Systems   Constitutional: Negative for chills and fever. Gastrointestinal: Negative for abdominal distention, abdominal pain, nausea and vomiting. Genitourinary: Negative for difficulty urinating, dysuria, flank pain, frequency, hematuria and urgency. Musculoskeletal: Negative for back pain and gait problem. Psychiatric/Behavioral: Negative for agitation and confusion. PHYSICAL EXAM:  Temp 98.8 °F (37.1 °C) (Temporal)   Ht 6' 3\" (1.905 m)   Wt 280 lb (127 kg)   BMI 35.00 kg/m²   Physical Exam  Vitals and nursing note reviewed. Constitutional:       General: He is not in acute distress. Appearance: Normal appearance. He is not ill-appearing. Pulmonary:      Effort: Pulmonary effort is normal. No respiratory distress. Abdominal:      General: There is no distension. Tenderness: There is no abdominal tenderness. There is no right CVA tenderness or left CVA tenderness. Neurological:      Mental Status: He is alert and oriented to person, place, and time. Mental status is at baseline.    Psychiatric:         Mood and Affect: Mood normal. Referral Reason:   Specialty Services Required     Requested Specialty:   Vascular Surgery     Number of Visits Requested:   1    POCT Urinalysis no Micro    IN Measure, post-void residual, US, non-imaging    Cystoscopy     Next available     Standing Status:   Future     Standing Expiration Date:   6/28/2022     Scheduling Instructions:      Next available        Return for with Dr. Jason Mariano, cystoscopy, next available. An electronic signature was used to authenticate this note. DION ESCALONA, APRN - CNP    All information inputted into the note by the MA to include chief complaint, past medical history, past surgical history, medications, allergies, social and family history and review of systems has been reviewed and updated as needed by me. EMR Dragon/transcription disclaimer: Much of this document is electronic transcription/translation of spoken language to printed text. The electronic translation of spoken language may be erroneous or, at times, nonsensical words or phrases may be inadvertently transcribed.  Although I have reviewed the document for such errors, some may still exist.

## 2021-06-30 ASSESSMENT — ENCOUNTER SYMPTOMS
BACK PAIN: 0
ABDOMINAL DISTENTION: 0
NAUSEA: 0
VOMITING: 0
ABDOMINAL PAIN: 0

## 2021-07-02 ENCOUNTER — TELEPHONE (OUTPATIENT)
Dept: UROLOGY | Age: 71
End: 2021-07-02

## 2021-07-13 ENCOUNTER — TELEPHONE (OUTPATIENT)
Dept: VASCULAR SURGERY | Age: 71
End: 2021-07-13

## 2021-07-13 NOTE — TELEPHONE ENCOUNTER
Patient's wife called in regarding new patient appointment on 7/14/21. She states they did not know about the referral or the appointment until they received the letter yesterday and are unable to make the appointment tomorrow. I advised rescheduling for sooner rather than later but she stated she will call back to reschedule the appointment.

## 2021-07-19 ENCOUNTER — TELEPHONE (OUTPATIENT)
Dept: VASCULAR SURGERY | Age: 71
End: 2021-07-19

## 2021-07-19 NOTE — TELEPHONE ENCOUNTER
Attempted to contact patient at both phone numbers listed. Both numbers are disconnected and no longer in service.  Unable to contact to schedule referral

## 2021-08-23 ENCOUNTER — TELEPHONE (OUTPATIENT)
Dept: UROLOGY | Age: 71
End: 2021-08-23

## 2021-08-26 ENCOUNTER — PROCEDURE VISIT (OUTPATIENT)
Dept: UROLOGY | Age: 71
End: 2021-08-26
Payer: MEDICARE

## 2021-08-26 VITALS — TEMPERATURE: 97.9 F | BODY MASS INDEX: 34.94 KG/M2 | HEIGHT: 75 IN | WEIGHT: 281 LBS

## 2021-08-26 DIAGNOSIS — N20.0 BILATERAL KIDNEY STONES: Primary | ICD-10-CM

## 2021-08-26 DIAGNOSIS — N40.1 BENIGN PROSTATIC HYPERPLASIA WITH URINARY FREQUENCY: ICD-10-CM

## 2021-08-26 DIAGNOSIS — R35.0 BENIGN PROSTATIC HYPERPLASIA WITH URINARY FREQUENCY: ICD-10-CM

## 2021-08-26 DIAGNOSIS — N39.0 RECURRENT UTI: ICD-10-CM

## 2021-08-26 LAB
BACTERIA URINE, POC: ABNORMAL
BILIRUBIN URINE: 0 MG/DL
BLOOD, URINE: NEGATIVE
CASTS URINE, POC: 0
CLARITY: CLEAR
COLOR: YELLOW
CRYSTALS URINE, POC: 0
EPI CELLS URINE, POC: 0
GLUCOSE URINE: ABNORMAL
KETONES, URINE: NEGATIVE
LEUKOCYTE EST, POC: ABNORMAL
NITRITE, URINE: POSITIVE
PH UA: 6 (ref 4.5–8)
PROTEIN UA: NEGATIVE
RBC URINE, POC: 0
SPECIFIC GRAVITY UA: 1.01 (ref 1–1.03)
UROBILINOGEN, URINE: NORMAL
WBC URINE, POC: 30
YEAST URINE, POC: 0

## 2021-08-26 PROCEDURE — 52000 CYSTOURETHROSCOPY: CPT | Performed by: UROLOGY

## 2021-08-26 PROCEDURE — 51798 US URINE CAPACITY MEASURE: CPT | Performed by: UROLOGY

## 2021-08-26 PROCEDURE — 81001 URINALYSIS AUTO W/SCOPE: CPT | Performed by: UROLOGY

## 2021-08-26 RX ORDER — TAMSULOSIN HYDROCHLORIDE 0.4 MG/1
0.4 CAPSULE ORAL DAILY
COMMUNITY
Start: 2021-08-12

## 2021-08-26 RX ORDER — NITROFURANTOIN 25; 75 MG/1; MG/1
CAPSULE ORAL
COMMUNITY
Start: 2021-08-11 | End: 2021-09-22 | Stop reason: ALTCHOICE

## 2021-08-26 NOTE — PROGRESS NOTES
Patient here for evaluation of recurrent UTI. Patient has been hospitalized and treated several times over the last 4 to 5 months for recurrent UTI. He is currently now on Macrobid with a long course of antibiotics. He says he has felt better over the last 1 to 2 months. He has had intermittent fever abdominal pain dysuria low back pain frequency. He has had 2 different CT scans that basically revealed nonobstructing bilateral renal calculi. A cyst of the lower pole the right kidney no hydronephrosis. Normal-appearing bladder except for maybe some mild bladder wall thickening. No evidence of diverticulitis or fistula. He has not had a post void residual measured has not had a recent PSA. He has had a dorsal slit in the remote past      Cystoscopy Procedure Note    Indications: Diagnosis    Pre-operative Diagnosis: Recurrent urinary tract infections    Post-operative Diagnosis: Recurrent urinary tract infections    Surgeon: Kristen Zamudio MD     Assistants: staff    Anesthesia: Local anesthesia topical 2% lidocaine gel    Procedure Details   The risks, benefits, complications, treatment options, and expected outcomes were discussed with the patient. The patient concurred with the proposed plan, giving informed consent. Cystoscopy was performed today under local anesthesia, using sterile technique. The patient was placed in the supine position, prepped with Hibiclens, and draped in the usual sterile fashion. A 17 Belgian sheath flexible cystoscope was used to inspect both the urethra and bladder using the flexible scope. Findings:  Anterior urethra: normal with strictures at the meatus but I was able to eventually get the flexible cystoscope through the meatus and without scarring. Prostate:  Prostatic urethra: 1+ mild prostatic hyperplasia. median lobe present? no.   Bladder: 1+ trabeculation, without lesions normal-appearing bladder mucosa. Specifically there was no inflammatory or cystitis type changes. The urine was slightly cloudy however. I did aspirate 10 mL of urine through the cystoscope which will be sent for culture. Ureteral orifice(s) was/were seen bilateral. Ureteral orifice(s) both were in the normal location and both ureteral orifices were effluxing clear urine. Unremarkable cystoscopy                            Complications:  None; patient tolerated the procedure well. Disposition: To home after observation. Condition: stable      DATA:  CBC:   Lab Results   Component Value Date    WBC 7.2 08/27/2018    RBC 5.18 08/27/2018    HGB 13.7 08/28/2018    HCT 43.3 08/28/2018    MCV 85.7 08/27/2018    MCH 27.0 08/27/2018    MCHC 31.5 08/27/2018    RDW 13.9 08/27/2018     08/27/2018    MPV 11.9 08/27/2018     BMP:    Lab Results   Component Value Date     08/27/2018    K 4.3 08/27/2018     08/27/2018    CO2 28 08/27/2018    BUN 23 08/27/2018    LABALBU 3.7 07/26/2018    CREATININE 1.3 08/27/2018    CALCIUM 9.1 08/27/2018    LABGLOM 55 08/27/2018    GLUCOSE 119 08/27/2018     Results for orders placed or performed in visit on 08/26/21   POCT Urinalysis Dipstick w/ Micro (Auto)   Result Value Ref Range    Color, UA Yellow     Clarity, UA Clear Clear    Glucose, Ur neg     Bilirubin Urine 0 mg/dL    Ketones, Urine Negative     Specific Gravity, UA 1.015 1.005 - 1.030    Blood, Urine Negative     pH, UA 6.0 4.5 - 8.0    Protein, UA Negative Negative    Nitrite, Urine Positive     Leukocytes, UA moderate     Urobilinogen, Urine Normal     rbc urine, poc 0     wbc urine, poc 30     bacteria urine, poc 2+     yeast urine, poc 0     casts urine, poc 0     epi cells urine, poc 0     crystals urine, poc 0          No recent PSA available in epic or on outside labs. IMAGING:  I reviewed the CT scan done at [facility] from June 2021. Shows normal kidneys bilaterally. There are some nonobstructing stones bilaterally. These measure 4 to 5 mm in size.  There is a prior to next visit     Standing Status:   Future     Standing Expiration Date:   8/26/2022    POCT Urinalysis Dipstick w/ Micro (Auto)    [place] MEASUREMENT,POST-VOID RESIDUAL VOLUME BY US,NON-IMAGING     13ml        Return in about 1 month (around 9/26/2021) for Follow-up with Max Marciale next visit.

## 2021-08-28 LAB
ORGANISM: ABNORMAL
URINE CULTURE, ROUTINE: ABNORMAL
URINE CULTURE, ROUTINE: ABNORMAL

## 2021-08-30 DIAGNOSIS — B96.29 UTI DUE TO EXTENDED-SPECTRUM BETA LACTAMASE (ESBL) PRODUCING ESCHERICHIA COLI: ICD-10-CM

## 2021-08-30 DIAGNOSIS — N39.0 UTI DUE TO EXTENDED-SPECTRUM BETA LACTAMASE (ESBL) PRODUCING ESCHERICHIA COLI: ICD-10-CM

## 2021-08-30 DIAGNOSIS — Z16.12 UTI DUE TO EXTENDED-SPECTRUM BETA LACTAMASE (ESBL) PRODUCING ESCHERICHIA COLI: ICD-10-CM

## 2021-08-30 RX ORDER — DIPHENHYDRAMINE HYDROCHLORIDE 50 MG/ML
50 INJECTION INTRAMUSCULAR; INTRAVENOUS ONCE
Status: CANCELLED | OUTPATIENT
Start: 2021-08-30 | End: 2021-08-30

## 2021-08-30 RX ORDER — SODIUM CHLORIDE 9 MG/ML
25 INJECTION, SOLUTION INTRAVENOUS PRN
Status: CANCELLED | OUTPATIENT
Start: 2021-08-30

## 2021-08-30 RX ORDER — SODIUM CHLORIDE 9 MG/ML
INJECTION, SOLUTION INTRAVENOUS CONTINUOUS
Status: CANCELLED | OUTPATIENT
Start: 2021-08-30

## 2021-08-30 RX ORDER — METHYLPREDNISOLONE SODIUM SUCCINATE 125 MG/2ML
125 INJECTION, POWDER, LYOPHILIZED, FOR SOLUTION INTRAMUSCULAR; INTRAVENOUS ONCE
Status: CANCELLED | OUTPATIENT
Start: 2021-08-30 | End: 2021-08-30

## 2021-08-30 RX ORDER — EPINEPHRINE 1 MG/ML
0.3 INJECTION, SOLUTION, CONCENTRATE INTRAVENOUS PRN
Status: CANCELLED | OUTPATIENT
Start: 2021-08-30

## 2021-08-30 RX ORDER — SODIUM CHLORIDE 0.9 % (FLUSH) 0.9 %
5-40 SYRINGE (ML) INJECTION PRN
Status: CANCELLED | OUTPATIENT
Start: 2021-08-30

## 2021-08-30 RX ORDER — HEPARIN SODIUM (PORCINE) LOCK FLUSH IV SOLN 100 UNIT/ML 100 UNIT/ML
500 SOLUTION INTRAVENOUS PRN
Status: CANCELLED | OUTPATIENT
Start: 2021-08-30

## 2021-08-31 ENCOUNTER — TELEPHONE (OUTPATIENT)
Dept: UROLOGY | Age: 71
End: 2021-08-31

## 2021-08-31 NOTE — TELEPHONE ENCOUNTER
(MS) I have tried to call patient's home phone listed and wife's number listed in this message to give urine culture result info and let them know Mr. Sergio Fuentes will need IV abx. I have called approx 3 times with no answer and both phone numbers have no voicemail set up. I will try to call pt again later today. If patient calls and I am unavailable please relay info to patient or wife that he will need to schd the IV abx for a positive urine culture. Alert and oriented to person, place and time

## 2021-08-31 NOTE — TELEPHONE ENCOUNTER
Called and spoke with patient's wife, she preferred to have pt do IV abx at [facility] since it is closer to them. (Pt lives in [place]) I did go over urine culture results and information per [address] I will fax order over today at [phone]. Pt's wife works at [employer] and said she would set this up. I will request on order being faxed that appt info be faxed back to us.

## 2021-08-31 NOTE — TELEPHONE ENCOUNTER
Patient wife Gila Ramos is requesting a return call from the office. She says the Infusion dept call yesterday stating the patient was to have a IV for 14 days. Wife says she does not know anything about this. Please call return call. Thank you!

## 2021-09-01 NOTE — TELEPHONE ENCOUNTER
The pt wife called and stated that the hospital did not get the orders. The correct fax number is [phone]. Please resend the orders.

## 2021-09-22 ENCOUNTER — OFFICE VISIT (OUTPATIENT)
Dept: UROLOGY | Age: 71
End: 2021-09-22
Payer: MEDICARE

## 2021-09-22 ENCOUNTER — HOSPITAL ENCOUNTER (OUTPATIENT)
Dept: GENERAL RADIOLOGY | Age: 71
Discharge: HOME | End: 2021-09-22
Payer: MEDICARE

## 2021-09-22 VITALS — BODY MASS INDEX: 35.56 KG/M2 | TEMPERATURE: 97.6 F | WEIGHT: 286 LBS | HEIGHT: 75 IN

## 2021-09-22 DIAGNOSIS — N39.0 RECURRENT UTI: ICD-10-CM

## 2021-09-22 DIAGNOSIS — N40.1 BENIGN PROSTATIC HYPERPLASIA WITH URINARY FREQUENCY: Primary | ICD-10-CM

## 2021-09-22 DIAGNOSIS — R35.0 BENIGN PROSTATIC HYPERPLASIA WITH URINARY FREQUENCY: Primary | ICD-10-CM

## 2021-09-22 DIAGNOSIS — N20.0 KIDNEY STONE: ICD-10-CM

## 2021-09-22 DIAGNOSIS — N20.0 BILATERAL NEPHROLITHIASIS: ICD-10-CM

## 2021-09-22 LAB
BILIRUBIN, POC: NORMAL
BLOOD URINE, POC: NORMAL
CLARITY, POC: CLEAR
COLOR, POC: YELLOW
GLUCOSE URINE, POC: NORMAL
KETONES, POC: NORMAL
LEUKOCYTE EST, POC: NORMAL
NITRITE, POC: NORMAL
PH, POC: 5.5
PROTEIN, POC: NORMAL
SPECIFIC GRAVITY, POC: 1.02
UROBILINOGEN, POC: 0.2

## 2021-09-22 PROCEDURE — 74018 RADEX ABDOMEN 1 VIEW: CPT

## 2021-09-22 PROCEDURE — 99214 OFFICE O/P EST MOD 30 MIN: CPT | Performed by: NURSE PRACTITIONER

## 2021-09-22 PROCEDURE — 81003 URINALYSIS AUTO W/O SCOPE: CPT | Performed by: NURSE PRACTITIONER

## 2021-09-22 RX ORDER — LISINOPRIL 20 MG/1
20 TABLET ORAL 2 TIMES DAILY
COMMUNITY
Start: 2021-09-10

## 2021-09-22 NOTE — PROGRESS NOTES
Erick De Anda is a 73 y.o., male, Established patient who presents today   Chief Complaint   Patient presents with    Follow-up     I am here today for my 1 month follow up with no complaints. HPI   Patient presents for follow-up of recurrent urinary tract infection. He has somewhat of a complicated history, requiring hospitalization for urinary tract infection several months ago in an outside facility with ESBL E. coli. He was evaluated with cystoscopy by Dr. Henny Dia did not reveal any significant abnormalities. He also had 2 CT scans in an outside facility which did not reveal any significant abnormalities other than bilateral renal stones. He was previously treated by our office for stones with his most recent surgical intervention in August 2018 when he underwent cystoscopy, right ureteroscopy, laser lithotripsy and stent placement for a right ureteral calculus and brought back again on 8/28/2018 for ESWL. He also underwent dorsal slit at that time. He reports since his cystoscopy, his urinary symptoms have been somewhat better. He continues to complain of hesitancy and mild dysuria in the morning, but no other significant symptoms. He continues to be maintained on Flomax. REVIEW OF SYSTEMS:  Review of Systems   Constitutional: Negative for chills and fever. Gastrointestinal: Negative for abdominal distention, abdominal pain, nausea and vomiting. Genitourinary: Negative for difficulty urinating, dysuria, flank pain, frequency, hematuria and urgency. Musculoskeletal: Negative for back pain and gait problem. Psychiatric/Behavioral: Negative for agitation and confusion. PHYSICAL EXAM:  Temp 97.6 °F (36.4 °C) (Temporal)   Ht 6' 3\" (1.905 m)   Wt 286 lb (129.7 kg)   BMI 35.75 kg/m²   Physical Exam  Vitals and nursing note reviewed. Constitutional:       General: He is not in acute distress. Appearance: Normal appearance. He is not ill-appearing.    Pulmonary:      Effort: Pulmonary effort is normal. No respiratory distress. Abdominal:      General: There is no distension. Tenderness: There is no abdominal tenderness. There is no right CVA tenderness or left CVA tenderness. Neurological:      Mental Status: He is alert and oriented to person, place, and time. Mental status is at baseline. Psychiatric:         Mood and Affect: Mood normal.         Behavior: Behavior normal.         DATA:  Results for orders placed or performed in visit on 09/22/21   POCT Urinalysis No Micro (Auto)   Result Value Ref Range    Color, UA yellow     Clarity, UA clear     Glucose, UA POC neg     Bilirubin, UA neg     Ketones, UA neg     Spec Grav, UA 1.020     Blood, UA POC trace     pH, UA 5.5     Protein, UA POC neg     Urobilinogen, UA 0.2     Leukocytes, UA neg     Nitrite, UA neg        ASSESSMENT/PLAN  1. Benign prostatic hyperplasia with urinary frequency  Patient with complaints of only mild urinary symptoms. He is currently maintained on Flomax and doing well. He underwent urethral dilation during his cystoscopy and continues to empty his bladder well. - POCT Urinalysis No Micro (Auto)    2. Bilateral nephrolithiasis  KUB today did reveal bilateral nephrolithiasis. I explained to the patient that his stones could be considered a source of infection for his recurrent UTIs. I explained that as the stones are visible on KUB, he could be a candidate for ESWL. Patient does not wish to proceed with any surgical intervention at this time. He does understand that if the stones are source of infection he could experience more episodes of pain in the near future. - XR ABDOMEN (KUB) (SINGLE AP VIEW); Future    3. Recurrent UTI  Again, now definitive reason for recurrent urinary tract infections. Previous CT imaging reveals no fistulas. He empties his bladder well and does not have a significantly enlarged prostate.   Again, his stones could be a source of infection, however, patient does not wish to have these treated at this time. If his infections continue, we would certainly recommend treatment of the stones and possibly consultation with infectious diseases. Patient voices understanding. Orders Placed This Encounter   Procedures    XR ABDOMEN (KUB) (SINGLE AP VIEW)     Standing Status:   Future     Standing Expiration Date:   9/22/2022    POCT Urinalysis No Micro (Auto)        Return in about 6 months (around 3/22/2022) for KUB prior. An electronic signature was used to authenticate this note. SANTI CASTAÑEDA - CNP    All information inputted into the note by the MA to include chief complaint, past medical history, past surgical history, medications, allergies, social and family history and review of systems has been reviewed and updated as needed by me. EMR Dragon/transcription disclaimer: Much of this document is electronic transcription/translation of spoken language to printed text. The electronic translation of spoken language may be erroneous or, at times, nonsensical words or phrases may be inadvertently transcribed.  Although I have reviewed the document for such errors, some may still exist.

## 2021-09-26 ASSESSMENT — ENCOUNTER SYMPTOMS
VOMITING: 0
ABDOMINAL PAIN: 0
BACK PAIN: 0
ABDOMINAL DISTENTION: 0
NAUSEA: 0

## 2021-11-23 ENCOUNTER — TELEPHONE (OUTPATIENT)
Dept: UROLOGY | Age: 71
End: 2021-11-23

## 2021-11-23 NOTE — TELEPHONE ENCOUNTER
Wife called to schedule a appt to have stones removed. Pt. keeps having UTI and said [facility] told them that the stones maybe causing UTI's. Please be advised that the best time to call him to accommodate their needs is Anytime. Thank you.

## 2021-11-24 ENCOUNTER — TELEPHONE (OUTPATIENT)
Dept: UROLOGY | Age: 71
End: 2021-11-24

## 2021-11-29 ENCOUNTER — TELEPHONE (OUTPATIENT)
Dept: UROLOGY | Age: 71
End: 2021-11-29

## 2021-12-07 NOTE — PROGRESS NOTES
Breann Cat is a 73 y.o. male who presents today   Chief Complaint   Patient presents with    Follow-up     I am here today for possible UTI and kidney stones       Patient is a 72-year-old male presents to clinic today with recurrent UTIs. He underwent cystoscopy on 8/26/2021 was also diagnosed with ESBL UTI. He states he has had several UTIs since then. Urine culture on 10/13/2021 and 11/24/2021 both revealed ESBL. These were taken at another facility and patient was treated with Rocephin IM and oral Macrobid. He states he keeps having intermittent fever up to 102 or 103, dysuria. It seems to go away for a few weeks and then recurs. He is here today to get scheduled for ESWL since his stones could be causing recurrent UTIs. Currently maintained on Coumadin. Was treated last on the ER add creatinine with IM Rocephin and is now on Macrobid. Today he states he has had some odorous urine although has not had a fever for 24 hours. Dysuria has improved. He previously did not want to undergo ESWL but continues to have recurrent UTIs. Last intervention was in August 2018 where he underwent right ureteroscopy stent placement and was brought back on 8/28/2018 for ESWL. He also underwent dorsal slit at that time. Currently maintained on tamsulosin. Past Medical History:   Diagnosis Date    ACS (acute coronary syndrome) ([facility] [address] 4/27/2016 4/27/16  ACS  BRANDO RISK Score 1 (angina), AUC indication 3, AUC score 7 4/27/16  Cath  99% 1st diagonal, 2.25 x 15 YURIDIA, anterior lateral hypokinesis, EF 45%     CAD (coronary artery disease)     sees dr. Юлия Dominique    Cerebral artery occlusion with cerebral infarction [facility]) 1998    Difficulty voiding     per pt c/o.     Hyperlipidemia     Hypertension 4/27/2016    MI (myocardial infarction) ([facility] [address]     stent    Renal calculus, right 8/1/2018       Past Surgical History:   Procedure Laterality Date    CARDIAC CATHETERIZATION      COLONOSCOPY      CYSTOSCOPY INSERTION / REMOVAL STENT / STONE Right 8/28/2018    CYSTOSCOPY STENT REMOVAL performed by Teresa Flores MD at [facility] D/O N/A 8/21/2018    DORSAL SLIT performed by Teresa Flores MD at [facility] [address] W/LITHOTRIPSY &INDWELL STENT INSRT Right 8/21/2018    CYSTOSCOPY URETEROSCOPY LASER LITHOTRIPSY performed by Teresa Flores MD at [address] [place] [address] ESWL Right 8/28/2018    ESWL EXTRACORPEAL SHOCK WAVE LITHOTRIPSY performed by Teresa Flores MD at [facility] OR       Current Outpatient Medications   Medication Sig Dispense Refill    dilTIAZem (CARDIZEM 12 HR) 120 MG extended release capsule Take 120 mg by mouth 2 times daily      nitrofurantoin, macrocrystal-monohydrate, (MACROBID) 100 MG capsule       lisinopril (PRINIVIL;ZESTRIL) 20 MG tablet TAKE 1 TABLET BY MOUTH TWO (2) TIMES A DAY      tamsulosin (FLOMAX) 0.4 MG capsule TAKE 1 CAPSULE BY MOUTH EVERY DAY      diclofenac (VOLTAREN) 50 MG EC tablet       warfarin (COUMADIN) 1 MG tablet Take 5 mg by mouth daily       clotrimazole-betamethasone (LOTRISONE) 1-0.05 % cream Apply topically 2 times daily. 1 Tube 1    metoprolol succinate (TOPROL XL) 25 MG extended release tablet Take 1 tablet by mouth daily 90 tablet 3    Diphenhydramine-APAP, sleep, (TYLENOL PM EXTRA STRENGTH PO) Take by mouth       CARTIA  MG extended release capsule TAKE 1 CAPSULE BY MOUTH EVERY DAY (Patient not taking: Reported on 12/8/2021)      bacitracin-polymyxin b (POLYSPORIN) 500-56405 UNIT/GM ointment Apply to incision TID (Patient not taking: Reported on 12/8/2021) 15 g 1    betamethasone dipropionate (DIPROLENE) 0.05 % cream Apply topically 2 times daily. (Patient not taking: Reported on 12/8/2021) 15 g 0     No current facility-administered medications for this visit. Allergies   Allergen Reactions    Codeine Other (See Comments)     Describes it as a sensitivity.          Social History     Socioeconomic History    Marital status:      Spouse name: jessica gunn    Number of children: 2    Years of education: None    Highest education level: None   Occupational History    Occupation: farmer    Tobacco Use    Smoking status: Former Smoker     Packs/day: 3.00     Years: 35.00     Pack years: 105.00    Smokeless tobacco: Former User     Quit date: 4/27/2001   Vaping Use    Vaping Use: Never used   Substance and Sexual Activity    Alcohol use: No    Drug use: No    Sexual activity: Yes     Partners: Female   Other Topics Concern    None   Social History Narrative    None     Social Determinants of Health     Financial Resource Strain:     Difficulty of Paying Living Expenses: Not on file   Food Insecurity:     Worried About Running Out of Food in the Last Year: Not on file    Lilian of Food in the Last Year: Not on file   Transportation Needs:     Lack of Transportation (Medical): Not on file    Lack of Transportation (Non-Medical):  Not on file   Physical Activity:     Days of Exercise per Week: Not on file    Minutes of Exercise per Session: Not on file   Stress:     Feeling of Stress : Not on file   Social Connections:     Frequency of Communication with Friends and Family: Not on file    Frequency of Social Gatherings with Friends and Family: Not on file    Attends Denominational Services: Not on file    Active Member of [address] [place] [address] [employer] or Organizations: Not on file    Attends Club or Organization Meetings: Not on file    Marital Status: Not on file   Intimate Partner Violence:     Fear of Current or Ex-Partner: Not on file    Emotionally Abused: Not on file    Physically Abused: Not on file    Sexually Abused: Not on file   Housing Stability:     Unable to Pay for Housing in the Last Year: Not on file    Number of [place] in the Last Year: Not on file    Unstable Housing in the Last Year: Not on file       Family History   Problem Relation Age of Onset    Cancer Sister     Cancer Brother     Heart Disease Father        REVIEW OF SYSTEMS:  Review of Systems   Constitutional: Positive for fever. Negative for chills. Gastrointestinal: Negative for abdominal distention, abdominal pain, nausea and vomiting. Genitourinary: Positive for dysuria. Negative for difficulty urinating, flank pain, frequency, hematuria and urgency. Musculoskeletal: Negative for back pain and gait problem. Psychiatric/Behavioral: Negative for agitation and confusion. PHYSICAL EXAM:  Temp 99.1 °F (37.3 °C) (Temporal)   Ht 6' 3\" (1.905 m)   Wt 282 lb 6.4 oz (128.1 kg)   BMI 35.30 kg/m²   Physical Exam  Vitals and nursing note reviewed. Constitutional:       General: He is not in acute distress. Appearance: Normal appearance. He is not ill-appearing. Pulmonary:      Effort: Pulmonary effort is normal. No respiratory distress. Abdominal:      General: There is no distension. Tenderness: There is no abdominal tenderness. There is no right CVA tenderness or left CVA tenderness. Neurological:      Mental Status: He is alert and oriented to person, place, and time. Mental status is at baseline.    Psychiatric:         Mood and Affect: Mood normal.         Behavior: Behavior normal.       DATA:    Results for orders placed or performed in visit on 12/08/21   POCT Urinalysis Dipstick w/ Micro (Auto)   Result Value Ref Range    Color, UA Yellow     Clarity, UA Clear Clear    Glucose, Ur neg     Bilirubin Urine 0 mg/dL    Ketones, Urine Negative     Specific Gravity, UA 1.020 1.005 - 1.030    Blood, Urine Positive     pH, UA 6.0 4.5 - 8.0    Protein, UA Positive (A) Negative    Nitrite, Urine Negative     Leukocytes, UA small     Urobilinogen, Urine Normal     rbc urine, poc 2     wbc urine, poc tntc     bacteria urine, poc 2+     yeast urine, poc 0     casts urine, poc 0     Epi Cells Urine, POC +     crystals urine, poc 0      Lab Results   Component Value Date    PSA 0.17 09/15/2021 IMAGING:  KUB obtained today indicates bilateral nonobstructing nephrolithiasis. More on left. 1. Dysuria  Complains of intermittent dysuria was treated last night in creatinine ER for UTI. He received IM Rocephin and is currently maintained on oral Macrobid. He states dysuria has improved some although he has had several recurrent UTIs recently. - POCT Urinalysis Dipstick w/ Micro (Auto)  - Culture, Urine    2. Benign prostatic hyperplasia with urinary frequency  History of BPH currently maintained on tamsulosin. Happy with his voiding symptoms. 3. Bilateral nephrolithiasis  Bilateral nephrolithiasis present on KUB today more on left than right. These appear approximately 4 to 5 mm in size. He requests to undergo a left renal ESWL at this time to hopefully decrease incidence of recurrent UTI. Patient presents with a left renal stone. Based on the stone location and severity of symptoms the patient has elected to proceed with left renal ESWL. Currently maintained on Coumadin. Will obtain clearance to discontinue this 5 to 7 days prior to ESWL. We will also send cath urine specimen to ensure no infection needs to be treated prior to surgery. We have discussed other modalities of treatment, including but not limited to a trial of medical expulsion therapy, ureteroscopy with laser lithotripsy, and monitoring the stone with subsequent imaging. Risks of the procedure were discussed which include but are not limited to bleeding, infection, postprocedural pain, damage to the kidney or surrounding organs, incomplete stone fragmentation or inability to pass stone fragments possibly causing urinary obstruction and resulting in need for additional/subsequent procedures or ureteral stenting. 4. Recurrent UTI  History of recurrent UTI. Recently treated in October, November and last night with fever and dysuria.   Several urine cultures revealed ESBL although patient has not been treated with IV antibiotics. This is likely treatment failure therefore this has reoccurred. Decreased symptoms today although we will go ahead and obtain cath urine sample to ensure no infection prior to ESWL. Discussed if he does have infection that continues to reveal ESBL will need IV antibiotics prior to ESWL. We discussed if ESWL does not minimize recurrent infections we can send to infectious disease for further recommendations. Orders Placed This Encounter   Procedures    Culture, Urine     Order Specific Question:   Specify (ex-cath, midstream, cysto, etc)? Answer:   straight cath    POCT Urinalysis Dipstick w/ Micro (Auto)        No follow-ups on file. All information inputted into the note by the MA to include chief complaint, past medical history, past surgical history, medications, allergies, social and family history and review of systems has been reviewed and updated as needed by me. EMR Dragon/transcription disclaimer: Much of this documentt is electronic  transcription/translation of spoken language to printed text. The  electronic translation of spoken language may be erroneous, or at times,  nonsensical words or phrases may be inadvertently transcribed.  Although I  have reviewed the document for such errors, some may still exist.

## 2021-12-08 ENCOUNTER — HOSPITAL ENCOUNTER (OUTPATIENT)
Dept: GENERAL RADIOLOGY | Age: 71
Discharge: HOME | End: 2021-12-08
Payer: MEDICARE

## 2021-12-08 ENCOUNTER — TELEPHONE (OUTPATIENT)
Dept: UROLOGY | Age: 71
End: 2021-12-08

## 2021-12-08 ENCOUNTER — OFFICE VISIT (OUTPATIENT)
Dept: UROLOGY | Age: 71
End: 2021-12-08
Payer: MEDICARE

## 2021-12-08 VITALS — TEMPERATURE: 99.1 F | HEIGHT: 75 IN | WEIGHT: 282.4 LBS | BODY MASS INDEX: 35.11 KG/M2

## 2021-12-08 DIAGNOSIS — N40.1 BENIGN PROSTATIC HYPERPLASIA WITH URINARY FREQUENCY: ICD-10-CM

## 2021-12-08 DIAGNOSIS — R35.0 BENIGN PROSTATIC HYPERPLASIA WITH URINARY FREQUENCY: ICD-10-CM

## 2021-12-08 DIAGNOSIS — N39.0 RECURRENT UTI: ICD-10-CM

## 2021-12-08 DIAGNOSIS — N20.0 BILATERAL NEPHROLITHIASIS: ICD-10-CM

## 2021-12-08 DIAGNOSIS — R30.0 DYSURIA: Primary | ICD-10-CM

## 2021-12-08 LAB
BACTERIA URINE, POC: ABNORMAL
BILIRUBIN URINE: 0 MG/DL
BLOOD, URINE: POSITIVE
CASTS URINE, POC: 0
CLARITY: CLEAR
COLOR: YELLOW
CRYSTALS URINE, POC: 0
EPI CELLS URINE, POC: ABNORMAL
GLUCOSE URINE: ABNORMAL
KETONES, URINE: NEGATIVE
LEUKOCYTE EST, POC: ABNORMAL
NITRITE, URINE: NEGATIVE
PH UA: 6 (ref 4.5–8)
PROTEIN UA: POSITIVE
RBC URINE, POC: 2
SPECIFIC GRAVITY UA: 1.02 (ref 1–1.03)
UROBILINOGEN, URINE: NORMAL
WBC URINE, POC: ABNORMAL
YEAST URINE, POC: 0

## 2021-12-08 PROCEDURE — 99215 OFFICE O/P EST HI 40 MIN: CPT | Performed by: NURSE PRACTITIONER

## 2021-12-08 PROCEDURE — 51798 US URINE CAPACITY MEASURE: CPT | Performed by: NURSE PRACTITIONER

## 2021-12-08 PROCEDURE — 74018 RADEX ABDOMEN 1 VIEW: CPT

## 2021-12-08 PROCEDURE — 81001 URINALYSIS AUTO W/SCOPE: CPT | Performed by: NURSE PRACTITIONER

## 2021-12-08 PROCEDURE — P9612 CATHETERIZE FOR URINE SPEC: HCPCS | Performed by: NURSE PRACTITIONER

## 2021-12-08 RX ORDER — DILTIAZEM HYDROCHLORIDE 120 MG/1
120 CAPSULE, EXTENDED RELEASE ORAL 2 TIMES DAILY
COMMUNITY
End: 2022-03-18

## 2021-12-08 RX ORDER — NITROFURANTOIN 25; 75 MG/1; MG/1
CAPSULE ORAL
COMMUNITY
Start: 2021-12-07 | End: 2022-01-05

## 2021-12-08 NOTE — PROGRESS NOTES
Patient complained of recurrent UTIs. After discussing with JENNIFER Yanez was given verbal orders to obtain cath urine specimen. After obtaining consent from patient. Using sterile technique patient is carefully prepped with Betadine around the urethra. A 16F Coude red rubber was used. Urethral Catheter is introduced into the urinary bladder. Urine specimen is obtained and sent to the lab for culture and sensitivity. Patient tolerated procedure well. JENNIFER Meehan was in office at time of procedure.

## 2021-12-08 NOTE — TELEPHONE ENCOUNTER
Attempted to call both numbers provided in chart for pt to come in for a KUB before appt today 12/8. No VM set up on either line.

## 2021-12-09 ENCOUNTER — PREP FOR PROCEDURE (OUTPATIENT)
Dept: UROLOGY | Age: 71
End: 2021-12-09

## 2021-12-09 ASSESSMENT — ENCOUNTER SYMPTOMS
ABDOMINAL DISTENTION: 0
ABDOMINAL PAIN: 0
VOMITING: 0
ABDOMINAL PAIN: 0
BACK PAIN: 0
BACK PAIN: 0
NAUSEA: 0
NAUSEA: 0
ABDOMINAL DISTENTION: 0
VOMITING: 0

## 2021-12-09 NOTE — H&P (VIEW-ONLY)
Nico Portillo is a 73 y.o. male who presents today   No chief complaint on file. Patient is a 79-year-old male presents to clinic today with recurrent UTIs. He underwent cystoscopy on 8/26/2021 was also diagnosed with ESBL UTI. He states he has had several UTIs since then. Urine culture on 10/13/2021 and 11/24/2021 both revealed ESBL. These were taken at another facility and patient was treated with Rocephin IM and oral Macrobid. He states he keeps having intermittent fever up to 102 or 103, dysuria. It seems to go away for a few weeks and then recurs. He is here today to get scheduled for ESWL since his stones could be causing recurrent UTIs. Currently maintained on Coumadin. Was treated last on the ER add creatinine with IM Rocephin and is now on Macrobid. Today he states he has had some odorous urine although has not had a fever for 24 hours. Dysuria has improved. He previously did not want to undergo ESWL but continues to have recurrent UTIs. Last intervention was in August 2018 where he underwent right ureteroscopy stent placement and was brought back on 8/28/2018 for ESWL. He also underwent dorsal slit at that time. Currently maintained on tamsulosin. Past Medical History:   Diagnosis Date    ACS (acute coronary syndrome) ([facility] [address] 4/27/2016 4/27/16  ACS  BRANDO RISK Score 1 (angina), AUC indication 3, AUC score 7 4/27/16  Cath  99% 1st diagonal, 2.25 x 15 YURIDIA, anterior lateral hypokinesis, EF 45%     CAD (coronary artery disease)     sees dr. Latrell Weber    Cerebral artery occlusion with cerebral infarction [facility]) 1998    Difficulty voiding     per pt c/o.     Hyperlipidemia     Hypertension 4/27/2016    MI (myocardial infarction) ([facility] [address]     stent    Renal calculus, right 8/1/2018       Past Surgical History:   Procedure Laterality Date    CARDIAC CATHETERIZATION      COLONOSCOPY      CYSTOSCOPY INSERTION / REMOVAL STENT / STONE Right 8/28/2018    CYSTOSCOPY STENT REMOVAL performed by Olayinka Valentino MD at [facility] D/O N/A 8/21/2018    DORSAL SLIT performed by Olayinka Valentino MD at [facility] [address] W/LITHOTRIPSY &INDWELL STENT INSRT Right 8/21/2018    CYSTOSCOPY URETEROSCOPY LASER LITHOTRIPSY performed by Olayinka Valentino MD at [address] [place] [address] ESWL Right 8/28/2018    ESWL EXTRACORPEAL SHOCK WAVE LITHOTRIPSY performed by Olayinka [facility]ino MD at [facility] OR       Current Outpatient Medications   Medication Sig Dispense Refill    dilTIAZem (CARDIZEM 12 HR) 120 MG extended release capsule Take 120 mg by mouth 2 times daily      nitrofurantoin, macrocrystal-monohydrate, (MACROBID) 100 MG capsule       lisinopril (PRINIVIL;ZESTRIL) 20 MG tablet TAKE 1 TABLET BY MOUTH TWO (2) TIMES A DAY      tamsulosin (FLOMAX) 0.4 MG capsule TAKE 1 CAPSULE BY MOUTH EVERY DAY      diclofenac (VOLTAREN) 50 MG EC tablet       CARTIA  MG extended release capsule TAKE 1 CAPSULE BY MOUTH EVERY DAY (Patient not taking: Reported on 12/8/2021)      warfarin (COUMADIN) 1 MG tablet Take 5 mg by mouth daily       clotrimazole-betamethasone (LOTRISONE) 1-0.05 % cream Apply topically 2 times daily. 1 Tube 1    bacitracin-polymyxin b (POLYSPORIN) [id number] UNIT/GM ointment Apply to incision TID (Patient not taking: Reported on 12/8/2021) 15 g 1    betamethasone dipropionate (DIPROLENE) 0.05 % cream Apply topically 2 times daily. (Patient not taking: Reported on 12/8/2021) 15 g 0    metoprolol succinate (TOPROL XL) 25 MG extended release tablet Take 1 tablet by mouth daily 90 tablet 3    Diphenhydramine-APAP, sleep, (TYLENOL PM EXTRA STRENGTH PO) Take by mouth        No current facility-administered medications for this visit. Allergies   Allergen Reactions    Codeine Other (See Comments)     Describes it as a sensitivity.          Social History     Socioeconomic History    Marital status:      Spouse name: Td Deirdre gunn    Number of children: 2    Years of education: Not on file    Highest education level: Not on file   Occupational History    Occupation: farmer    Tobacco Use    Smoking status: Former Smoker     Packs/day: 3.00     Years: 35.00     Pack years: 105.00    Smokeless tobacco: Former User     Quit date: 4/27/2001   Vaping Use    Vaping Use: Never used   Substance and Sexual Activity    Alcohol use: No    Drug use: No    Sexual activity: Yes     Partners: Female   Other Topics Concern    Not on file   Social History Narrative    Not on file     Social Determinants of Health     Financial Resource Strain:     Difficulty of Paying Living Expenses: Not on file   Food Insecurity:     Worried About Running Out of Food in the Last Year: Not on file    Lilian of Food in the Last Year: Not on file   Transportation Needs:     Lack of Transportation (Medical): Not on file    Lack of Transportation (Non-Medical):  Not on file   Physical Activity:     Days of Exercise per Week: Not on file    Minutes of Exercise per Session: Not on file   Stress:     Feeling of Stress : Not on file   Social Connections:     Frequency of Communication with Friends and Family: Not on file    Frequency of Social Gatherings with Friends and Family: Not on file    Attends Jew Services: Not on file    Active Member of [employer] or Organizations: Not on file    Attends Club or Organization Meetings: Not on file    Marital Status: Not on file   Intimate Partner Violence:     Fear of Current or Ex-Partner: Not on file    Emotionally Abused: Not on file    Physically Abused: Not on file    Sexually Abused: Not on file   Housing Stability:     Unable to Pay for Housing in the Last Year: Not on file    Number of [place] in the Last Year: Not on file    Unstable Housing in the Last Year: Not on file       Family History   Problem Relation Age of Onset    Cancer Sister     Cancer Brother     Heart Disease Father        REVIEW OF SYSTEMS:  Review of Systems   Constitutional: Positive for fever. Negative for chills. Gastrointestinal: Negative for abdominal distention, abdominal pain, nausea and vomiting. Genitourinary: Positive for dysuria. Negative for difficulty urinating, flank pain, frequency, hematuria and urgency. Musculoskeletal: Negative for back pain and gait problem. Psychiatric/Behavioral: Negative for agitation and confusion. PHYSICAL EXAM:  There were no vitals taken for this visit. Physical Exam  Vitals and nursing note reviewed. Constitutional:       General: He is not in acute distress. Appearance: Normal appearance. He is not ill-appearing. Pulmonary:      Effort: Pulmonary effort is normal. No respiratory distress. Abdominal:      General: There is no distension. Tenderness: There is no abdominal tenderness. There is no right CVA tenderness or left CVA tenderness. Neurological:      Mental Status: He is alert and oriented to person, place, and time. Mental status is at baseline. Psychiatric:         Mood and Affect: Mood normal.         Behavior: Behavior normal.       DATA:    No results found for this visit on 12/09/21. Lab Results   Component Value Date    PSA 0.17 09/15/2021         IMAGING:  KUB obtained today indicates bilateral nonobstructing nephrolithiasis. More on left. 1. Dysuria  Complains of intermittent dysuria was treated last night in creatinine ER for UTI. He received IM Rocephin and is currently maintained on oral Macrobid. He states dysuria has improved some although he has had several recurrent UTIs recently. - POCT Urinalysis Dipstick w/ Micro (Auto)  - Culture, Urine    2. Benign prostatic hyperplasia with urinary frequency  History of BPH currently maintained on tamsulosin. Happy with his voiding symptoms. 3. Bilateral nephrolithiasis  Bilateral nephrolithiasis present on KUB today more on left than right. These appear approximately 4 to 5 mm in size.   He requests to undergo a left renal ESWL at this time to hopefully decrease incidence of recurrent UTI. Patient presents with a left renal stone. Based on the stone location and severity of symptoms the patient has elected to proceed with left renal ESWL. Currently maintained on Coumadin. Will obtain clearance to discontinue this 5 to 7 days prior to ESWL. We will also send cath urine specimen to ensure no infection needs to be treated prior to surgery. We have discussed other modalities of treatment, including but not limited to a trial of medical expulsion therapy, ureteroscopy with laser lithotripsy, and monitoring the stone with subsequent imaging. Risks of the procedure were discussed which include but are not limited to bleeding, infection, postprocedural pain, damage to the kidney or surrounding organs, incomplete stone fragmentation or inability to pass stone fragments possibly causing urinary obstruction and resulting in need for additional/subsequent procedures or ureteral stenting. 4. Recurrent UTI  History of recurrent UTI. Recently treated in October, November and last night with fever and dysuria. Several urine cultures revealed ESBL although patient has not been treated with IV antibiotics. This is likely treatment failure therefore this has reoccurred. Decreased symptoms today although we will go ahead and obtain cath urine sample to ensure no infection prior to ESWL. Discussed if he does have infection that continues to reveal ESBL will need IV antibiotics prior to ESWL. We discussed if ESWL does not minimize recurrent infections we can send to infectious disease for further recommendations. No orders of the defined types were placed in this encounter. No follow-ups on file.     All information inputted into the note by the MA to include chief complaint, past medical history, past surgical history, medications, allergies, social and family history and review of systems has been reviewed and updated as needed by me. EMR Dragon/transcription disclaimer: Much of this documentt is electronic  transcription/translation of spoken language to printed text. The  electronic translation of spoken language may be erroneous, or at times,  nonsensical words or phrases may be inadvertently transcribed.  Although I  have reviewed the document for such errors, some may still exist.

## 2021-12-09 NOTE — H&P
Ramon Covarrubias is a 73 y.o. male who presents today   No chief complaint on file. Patient is a 79-year-old male presents to clinic today with recurrent UTIs. He underwent cystoscopy on 8/26/2021 was also diagnosed with ESBL UTI. He states he has had several UTIs since then. Urine culture on 10/13/2021 and 11/24/2021 both revealed ESBL. These were taken at another facility and patient was treated with Rocephin IM and oral Macrobid. He states he keeps having intermittent fever up to 102 or 103, dysuria. It seems to go away for a few weeks and then recurs. He is here today to get scheduled for ESWL since his stones could be causing recurrent UTIs. Currently maintained on Coumadin. Was treated last on the ER add creatinine with IM Rocephin and is now on Macrobid. Today he states he has had some odorous urine although has not had a fever for 24 hours. Dysuria has improved. He previously did not want to undergo ESWL but continues to have recurrent UTIs. Last intervention was in August 2018 where he underwent right ureteroscopy stent placement and was brought back on 8/28/2018 for ESWL. He also underwent dorsal slit at that time. Currently maintained on tamsulosin. Past Medical History:   Diagnosis Date    ACS (acute coronary syndrome) ([facility] 4/27/2016 4/27/16  ACS  BRANDO RISK Score 1 (angina), AUC indication 3, AUC score 7 4/27/16  Cath  99% 1st diagonal, 2.25 x 15 YURIDIA, anterior lateral hypokinesis, EF 45%     CAD (coronary artery disease)     sees dr. Tammie Santos    Cerebral artery occlusion with cerebral infarction [facility]) 1998    Difficulty voiding     per pt c/o.     Hyperlipidemia     Hypertension 4/27/2016    MI (myocardial infarction) ([facility]     stent    Renal calculus, right 8/1/2018       Past Surgical History:   Procedure Laterality Date    CARDIAC CATHETERIZATION      COLONOSCOPY      CYSTOSCOPY INSERTION / REMOVAL STENT / STONE Right 8/28/2018    CYSTOSCOPY STENT REMOVAL performed by Daniel Bauer MD at [facility] D/O N/A 8/21/2018    DORSAL SLIT performed by Daniel Bauer MD at [facility] 67 W/LITHOTRIPSY &INDWELL STENT INSRT Right 8/21/2018    CYSTOSCOPY URETEROSCOPY LASER LITHOTRIPSY performed by Daniel Bauer MD at [address] [place] [address] ESWL Right 8/28/2018    ESWL EXTRACORPEAL SHOCK WAVE LITHOTRIPSY performed by Daniel Bauer MD at [address] [place] [address] OR       Current Outpatient Medications   Medication Sig Dispense Refill    dilTIAZem (CARDIZEM 12 HR) 120 MG extended release capsule Take 120 mg by mouth 2 times daily      nitrofurantoin, macrocrystal-monohydrate, (MACROBID) 100 MG capsule       lisinopril (PRINIVIL;ZESTRIL) 20 MG tablet TAKE 1 TABLET BY MOUTH TWO (2) TIMES A DAY      tamsulosin (FLOMAX) 0.4 MG capsule TAKE 1 CAPSULE BY MOUTH EVERY DAY      diclofenac (VOLTAREN) 50 MG EC tablet       CARTIA  MG extended release capsule TAKE 1 CAPSULE BY MOUTH EVERY DAY (Patient not taking: Reported on 12/8/2021)      warfarin (COUMADIN) 1 MG tablet Take 5 mg by mouth daily       clotrimazole-betamethasone (LOTRISONE) 1-0.05 % cream Apply topically 2 times daily. 1 Tube 1    bacitracin-polymyxin b (POLYSPORIN) 500-57294 UNIT/GM ointment Apply to incision TID (Patient not taking: Reported on 12/8/2021) 15 g 1    betamethasone dipropionate (DIPROLENE) 0.05 % cream Apply topically 2 times daily. (Patient not taking: Reported on 12/8/2021) 15 g 0    metoprolol succinate (TOPROL XL) 25 MG extended release tablet Take 1 tablet by mouth daily 90 tablet 3    Diphenhydramine-APAP, sleep, (TYLENOL PM EXTRA STRENGTH PO) Take by mouth        No current facility-administered medications for this visit. Allergies   Allergen Reactions    Codeine Other (See Comments)     Describes it as a sensitivity.          Social History     Socioeconomic History    Marital status:      Spouse name: Panchito gunn    Number of children: 2    Years of education: Not on file    Highest education level: Not on file   Occupational History    Occupation: farmer    Tobacco Use    Smoking status: Former Smoker     Packs/day: 3.00     Years: 35.00     Pack years: 105.00    Smokeless tobacco: Former User     Quit date: 4/27/2001   Vaping Use    Vaping Use: Never used   Substance and Sexual Activity    Alcohol use: No    Drug use: No    Sexual activity: Yes     Partners: Female   Other Topics Concern    Not on file   Social History Narrative    Not on file     Social Determinants of Health     Financial Resource Strain:     Difficulty of Paying Living Expenses: Not on file   Food Insecurity:     Worried About Running Out of Food in the Last Year: Not on file    Lilian of Food in the Last Year: Not on file   Transportation Needs:     Lack of Transportation (Medical): Not on file    Lack of Transportation (Non-Medical):  Not on file   Physical Activity:     Days of Exercise per Week: Not on file    Minutes of Exercise per Session: Not on file   Stress:     Feeling of Stress : Not on file   Social Connections:     Frequency of Communication with Friends and Family: Not on file    Frequency of Social Gatherings with Friends and Family: Not on file    Attends Druze Services: Not on file    Active Member of [address] [place] [address] [employer] or Organizations: Not on file    Attends Club or Organization Meetings: Not on file    Marital Status: Not on file   Intimate Partner Violence:     Fear of Current or Ex-Partner: Not on file    Emotionally Abused: Not on file    Physically Abused: Not on file    Sexually Abused: Not on file   Housing Stability:     Unable to Pay for Housing in the Last Year: Not on file    Number of [place] in the Last Year: Not on file    Unstable Housing in the Last Year: Not on file       Family History   Problem Relation Age of Onset    Cancer Sister     Cancer Brother     Heart Disease Father        REVIEW OF SYSTEMS:  Review of Systems   Constitutional: Positive for fever. Negative for chills. Gastrointestinal: Negative for abdominal distention, abdominal pain, nausea and vomiting. Genitourinary: Positive for dysuria. Negative for difficulty urinating, flank pain, frequency, hematuria and urgency. Musculoskeletal: Negative for back pain and gait problem. Psychiatric/Behavioral: Negative for agitation and confusion. PHYSICAL EXAM:  There were no vitals taken for this visit. Physical Exam  Vitals and nursing note reviewed. Constitutional:       General: He is not in acute distress. Appearance: Normal appearance. He is not ill-appearing. Pulmonary:      Effort: Pulmonary effort is normal. No respiratory distress. Abdominal:      General: There is no distension. Tenderness: There is no abdominal tenderness. There is no right CVA tenderness or left CVA tenderness. Neurological:      Mental Status: He is alert and oriented to person, place, and time. Mental status is at baseline. Psychiatric:         Mood and Affect: Mood normal.         Behavior: Behavior normal.       DATA:    No results found for this visit on 12/09/21. Lab Results   Component Value Date    PSA 0.17 09/15/2021         IMAGING:  KUB obtained today indicates bilateral nonobstructing nephrolithiasis. More on left. 1. Dysuria  Complains of intermittent dysuria was treated last night in creatinine ER for UTI. He received IM Rocephin and is currently maintained on oral Macrobid. He states dysuria has improved some although he has had several recurrent UTIs recently. - POCT Urinalysis Dipstick w/ Micro (Auto)  - Culture, Urine    2. Benign prostatic hyperplasia with urinary frequency  History of BPH currently maintained on tamsulosin. Happy with his voiding symptoms. 3. Bilateral nephrolithiasis  Bilateral nephrolithiasis present on KUB today more on left than right. These appear approximately 4 to 5 mm in size.   He requests to undergo a left renal ESWL at this time to hopefully decrease incidence of recurrent UTI. Patient presents with a left renal stone. Based on the stone location and severity of symptoms the patient has elected to proceed with left renal ESWL. Currently maintained on Coumadin. Will obtain clearance to discontinue this 5 to 7 days prior to ESWL. We will also send cath urine specimen to ensure no infection needs to be treated prior to surgery. We have discussed other modalities of treatment, including but not limited to a trial of medical expulsion therapy, ureteroscopy with laser lithotripsy, and monitoring the stone with subsequent imaging. Risks of the procedure were discussed which include but are not limited to bleeding, infection, postprocedural pain, damage to the kidney or surrounding organs, incomplete stone fragmentation or inability to pass stone fragments possibly causing urinary obstruction and resulting in need for additional/subsequent procedures or ureteral stenting. 4. Recurrent UTI  History of recurrent UTI. Recently treated in October, November and last night with fever and dysuria. Several urine cultures revealed ESBL although patient has not been treated with IV antibiotics. This is likely treatment failure therefore this has reoccurred. Decreased symptoms today although we will go ahead and obtain cath urine sample to ensure no infection prior to ESWL. Discussed if he does have infection that continues to reveal ESBL will need IV antibiotics prior to ESWL. We discussed if ESWL does not minimize recurrent infections we can send to infectious disease for further recommendations. No orders of the defined types were placed in this encounter. No follow-ups on file.     All information inputted into the note by the MA to include chief complaint, past medical history, past surgical history, medications, allergies, social and family history and review of systems has been reviewed and updated as needed by me. EMR Dragon/transcription disclaimer: Much of this documentt is electronic  transcription/translation of spoken language to printed text. The  electronic translation of spoken language may be erroneous, or at times,  nonsensical words or phrases may be inadvertently transcribed.  Although I  have reviewed the document for such errors, some may still exist.

## 2021-12-10 LAB
ORGANISM: ABNORMAL
URINE CULTURE, ROUTINE: ABNORMAL
URINE CULTURE, ROUTINE: ABNORMAL

## 2021-12-16 ENCOUNTER — TELEPHONE (OUTPATIENT)
Dept: UROLOGY | Age: 71
End: 2021-12-16

## 2021-12-16 NOTE — TELEPHONE ENCOUNTER
Spoke to patient's wife letting her know we received his clearance to come off of his coumadin for surgery. He will stop it 7 days prior and start a Lovenox bridge on Dec 31st. His PCP did call that in. I told her if she had any other questions regarding the Lovenox she would need to contact his PCP since they manage that. She voiced understanding.

## 2021-12-20 ENCOUNTER — TELEPHONE (OUTPATIENT)
Dept: UROLOGY | Age: 71
End: 2021-12-20

## 2021-12-20 NOTE — TELEPHONE ENCOUNTER
let me know that [facility] called this morning during clinic requesting an auth for Invanz IV abx that we sent order for. I called his insurance and an Loetta Angel is not needed for this treatment and all they need to do is bill his insurance as normal. I spoke to someone in registration at [facility] who then transferred me to [facility] where I was told to leave a  with this information. I told her exactly what insurance said and asked that she call back if she has any further questions.

## 2021-12-22 ENCOUNTER — TELEPHONE (OUTPATIENT)
Dept: UROLOGY | Age: 71
End: 2021-12-22

## 2021-12-22 NOTE — TELEPHONE ENCOUNTER
I spoke to [facility] to verify patient was scheduled to finish his IV abx by his surgery on 1/6/21. He is scheduled 12/30-1/5 at 1:00 for those. Spoke with JENNIFER Majano to make sure this would be okay and she said it will be fine since it will be completed by his surgery date.

## 2022-01-04 ENCOUNTER — TELEPHONE (OUTPATIENT)
Dept: UROLOGY | Age: 72
End: 2022-01-04

## 2022-01-05 ENCOUNTER — HOSPITAL ENCOUNTER (OUTPATIENT)
Dept: PREADMISSION TESTING | Age: 72
Discharge: HOME | End: 2022-01-09
Payer: MEDICARE

## 2022-01-05 VITALS — BODY MASS INDEX: 34.82 KG/M2 | WEIGHT: 280 LBS | HEIGHT: 75 IN

## 2022-01-05 LAB
ANION GAP SERPL CALCULATED.3IONS-SCNC: 9 MMOL/L (ref 7–19)
APTT: 33.5 SEC (ref 26–36.2)
BASOPHILS ABSOLUTE: 0 K/UL (ref 0–0.2)
BASOPHILS RELATIVE PERCENT: 0.6 % (ref 0–1)
BUN BLDV-MCNC: 22 MG/DL (ref 8–23)
CALCIUM SERPL-MCNC: 9.4 MG/DL (ref 8.8–10.2)
CHLORIDE BLD-SCNC: 105 MMOL/L (ref 98–111)
CO2: 25 MMOL/L (ref 22–29)
COLLAGEN EPINEPHRINE TIME: 85 SEC (ref 84–176)
CREAT SERPL-MCNC: 1.7 MG/DL (ref 0.5–1.2)
EOSINOPHILS ABSOLUTE: 0.2 K/UL (ref 0–0.6)
EOSINOPHILS RELATIVE PERCENT: 4.5 % (ref 0–5)
GFR AFRICAN AMERICAN: 49
GFR NON-AFRICAN AMERICAN: 40
GLUCOSE BLD-MCNC: 96 MG/DL (ref 74–109)
HCT VFR BLD CALC: 48.5 % (ref 42–52)
HEMOGLOBIN: 15.1 G/DL (ref 14–18)
IMMATURE GRANULOCYTES #: 0 K/UL
INR BLD: 1.01 (ref 0.88–1.18)
LYMPHOCYTES ABSOLUTE: 1.3 K/UL (ref 1.1–4.5)
LYMPHOCYTES RELATIVE PERCENT: 26.4 % (ref 20–40)
MCH RBC QN AUTO: 26.8 PG (ref 27–31)
MCHC RBC AUTO-ENTMCNC: 31.1 G/DL (ref 33–37)
MCV RBC AUTO: 86 FL (ref 80–94)
MONOCYTES ABSOLUTE: 0.5 K/UL (ref 0–0.9)
MONOCYTES RELATIVE PERCENT: 9.6 % (ref 0–10)
NEUTROPHILS ABSOLUTE: 3 K/UL (ref 1.5–7.5)
NEUTROPHILS RELATIVE PERCENT: 58.7 % (ref 50–65)
PDW BLD-RTO: 13.5 % (ref 11.5–14.5)
PLATELET # BLD: 160 K/UL (ref 130–400)
PLATELET FUNCTION INTERPRETATION: NORMAL
PMV BLD AUTO: 11.7 FL (ref 9.4–12.4)
POTASSIUM SERPL-SCNC: 4.7 MMOL/L (ref 3.5–5)
PROTHROMBIN TIME: 13.5 SEC (ref 12–14.6)
RBC # BLD: 5.64 M/UL (ref 4.7–6.1)
SODIUM BLD-SCNC: 139 MMOL/L (ref 136–145)
WBC # BLD: 5.1 K/UL (ref 4.8–10.8)

## 2022-01-05 PROCEDURE — 85025 COMPLETE CBC W/AUTO DIFF WBC: CPT

## 2022-01-05 PROCEDURE — 85730 THROMBOPLASTIN TIME PARTIAL: CPT

## 2022-01-05 PROCEDURE — 93005 ELECTROCARDIOGRAM TRACING: CPT | Performed by: ANESTHESIOLOGY

## 2022-01-05 PROCEDURE — 80048 BASIC METABOLIC PNL TOTAL CA: CPT

## 2022-01-05 PROCEDURE — 85610 PROTHROMBIN TIME: CPT

## 2022-01-05 PROCEDURE — 85576 BLOOD PLATELET AGGREGATION: CPT

## 2022-01-05 RX ORDER — DIPHENHYDRAMINE HCL 25 MG
25 TABLET ORAL NIGHTLY PRN
COMMUNITY

## 2022-01-05 NOTE — TELEPHONE ENCOUNTER
Spoke to patient's wife and let her know that as long as he finished his abx today then he will be okay for surgery tomorrow and that the hospital would call with an arrival time.

## 2022-01-06 ENCOUNTER — APPOINTMENT (OUTPATIENT)
Dept: GENERAL RADIOLOGY | Age: 72
End: 2022-01-06
Attending: UROLOGY
Payer: MEDICARE

## 2022-01-06 ENCOUNTER — ANESTHESIA (OUTPATIENT)
Dept: OPERATING ROOM | Age: 72
End: 2022-01-06
Payer: MEDICARE

## 2022-01-06 ENCOUNTER — ANESTHESIA EVENT (OUTPATIENT)
Dept: OPERATING ROOM | Age: 72
End: 2022-01-06
Payer: MEDICARE

## 2022-01-06 ENCOUNTER — HOSPITAL ENCOUNTER (OUTPATIENT)
Age: 72
Setting detail: OUTPATIENT SURGERY
Discharge: HOME | End: 2022-01-06
Attending: UROLOGY | Admitting: UROLOGY
Payer: MEDICARE

## 2022-01-06 VITALS — SYSTOLIC BLOOD PRESSURE: 111 MMHG | OXYGEN SATURATION: 96 % | TEMPERATURE: 95.9 F | DIASTOLIC BLOOD PRESSURE: 67 MMHG

## 2022-01-06 VITALS
HEART RATE: 84 BPM | DIASTOLIC BLOOD PRESSURE: 86 MMHG | SYSTOLIC BLOOD PRESSURE: 150 MMHG | HEIGHT: 75 IN | WEIGHT: 285 LBS | BODY MASS INDEX: 35.43 KG/M2 | TEMPERATURE: 97.1 F | RESPIRATION RATE: 18 BRPM | OXYGEN SATURATION: 94 %

## 2022-01-06 DIAGNOSIS — N20.0 RENAL CALCULUS, LEFT: Primary | ICD-10-CM

## 2022-01-06 LAB
EKG P AXIS: -10 DEGREES
EKG P-R INTERVAL: 182 MS
EKG Q-T INTERVAL: 422 MS
EKG QRS DURATION: 102 MS
EKG QTC CALCULATION (BAZETT): 434 MS
EKG T AXIS: 108 DEGREES
HCT VFR BLD CALC: 46.9 % (ref 42–52)
HEMOGLOBIN: 14.3 G/DL (ref 14–18)

## 2022-01-06 PROCEDURE — 2580000003 HC RX 258: Performed by: NURSE ANESTHETIST, CERTIFIED REGISTERED

## 2022-01-06 PROCEDURE — 36415 COLL VENOUS BLD VENIPUNCTURE: CPT

## 2022-01-06 PROCEDURE — 50590 FRAGMENTING OF KIDNEY STONE: CPT | Performed by: UROLOGY

## 2022-01-06 PROCEDURE — 6370000000 HC RX 637 (ALT 250 FOR IP)

## 2022-01-06 PROCEDURE — 7100000010 HC PHASE II RECOVERY - FIRST 15 MIN: Performed by: UROLOGY

## 2022-01-06 PROCEDURE — 7100000011 HC PHASE II RECOVERY - ADDTL 15 MIN: Performed by: UROLOGY

## 2022-01-06 PROCEDURE — 2580000003 HC RX 258: Performed by: ANESTHESIOLOGY

## 2022-01-06 PROCEDURE — 2500000003 HC RX 250 WO HCPCS: Performed by: NURSE ANESTHETIST, CERTIFIED REGISTERED

## 2022-01-06 PROCEDURE — 6360000002 HC RX W HCPCS: Performed by: NURSE ANESTHETIST, CERTIFIED REGISTERED

## 2022-01-06 PROCEDURE — 74018 RADEX ABDOMEN 1 VIEW: CPT

## 2022-01-06 PROCEDURE — 85018 HEMOGLOBIN: CPT

## 2022-01-06 PROCEDURE — 7100000001 HC PACU RECOVERY - ADDTL 15 MIN: Performed by: UROLOGY

## 2022-01-06 PROCEDURE — 93010 ELECTROCARDIOGRAM REPORT: CPT | Performed by: INTERNAL MEDICINE

## 2022-01-06 PROCEDURE — 6360000002 HC RX W HCPCS: Performed by: NURSE PRACTITIONER

## 2022-01-06 PROCEDURE — 2709999900 HC NON-CHARGEABLE SUPPLY: Performed by: UROLOGY

## 2022-01-06 PROCEDURE — 85014 HEMATOCRIT: CPT

## 2022-01-06 PROCEDURE — 2580000003 HC RX 258: Performed by: NURSE PRACTITIONER

## 2022-01-06 PROCEDURE — 3700000001 HC ADD 15 MINUTES (ANESTHESIA): Performed by: UROLOGY

## 2022-01-06 PROCEDURE — 7100000000 HC PACU RECOVERY - FIRST 15 MIN: Performed by: UROLOGY

## 2022-01-06 PROCEDURE — 3700000000 HC ANESTHESIA ATTENDED CARE: Performed by: UROLOGY

## 2022-01-06 RX ORDER — SODIUM CHLORIDE, SODIUM LACTATE, POTASSIUM CHLORIDE, CALCIUM CHLORIDE 600; 310; 30; 20 MG/100ML; MG/100ML; MG/100ML; MG/100ML
INJECTION, SOLUTION INTRAVENOUS CONTINUOUS PRN
Status: DISCONTINUED | OUTPATIENT
Start: 2022-01-06 | End: 2022-01-06 | Stop reason: SDUPTHER

## 2022-01-06 RX ORDER — ONDANSETRON 2 MG/ML
INJECTION INTRAMUSCULAR; INTRAVENOUS PRN
Status: DISCONTINUED | OUTPATIENT
Start: 2022-01-06 | End: 2022-01-06 | Stop reason: SDUPTHER

## 2022-01-06 RX ORDER — SODIUM CHLORIDE, SODIUM LACTATE, POTASSIUM CHLORIDE, CALCIUM CHLORIDE 600; 310; 30; 20 MG/100ML; MG/100ML; MG/100ML; MG/100ML
INJECTION, SOLUTION INTRAVENOUS CONTINUOUS
Status: DISCONTINUED | OUTPATIENT
Start: 2022-01-06 | End: 2022-01-06 | Stop reason: HOSPADM

## 2022-01-06 RX ORDER — LABETALOL HYDROCHLORIDE 5 MG/ML
5 INJECTION, SOLUTION INTRAVENOUS EVERY 10 MIN PRN
Status: DISCONTINUED | OUTPATIENT
Start: 2022-01-06 | End: 2022-01-06 | Stop reason: HOSPADM

## 2022-01-06 RX ORDER — HYDROCODONE BITARTRATE AND ACETAMINOPHEN 5; 325 MG/1; MG/1
1 TABLET ORAL EVERY 6 HOURS PRN
Qty: 12 TABLET | Refills: 0 | Status: SHIPPED | OUTPATIENT
Start: 2022-01-06 | End: 2022-01-09

## 2022-01-06 RX ORDER — ROCURONIUM BROMIDE 10 MG/ML
INJECTION, SOLUTION INTRAVENOUS PRN
Status: DISCONTINUED | OUTPATIENT
Start: 2022-01-06 | End: 2022-01-06 | Stop reason: SDUPTHER

## 2022-01-06 RX ORDER — EPHEDRINE SULFATE 50 MG/ML
INJECTION, SOLUTION INTRAVENOUS PRN
Status: DISCONTINUED | OUTPATIENT
Start: 2022-01-06 | End: 2022-01-06 | Stop reason: SDUPTHER

## 2022-01-06 RX ORDER — ENALAPRILAT 2.5 MG/2ML
1.25 INJECTION INTRAVENOUS
Status: DISCONTINUED | OUTPATIENT
Start: 2022-01-06 | End: 2022-01-06 | Stop reason: HOSPADM

## 2022-01-06 RX ORDER — HYDROMORPHONE HYDROCHLORIDE 1 MG/ML
0.5 INJECTION, SOLUTION INTRAMUSCULAR; INTRAVENOUS; SUBCUTANEOUS EVERY 5 MIN PRN
Status: DISCONTINUED | OUTPATIENT
Start: 2022-01-06 | End: 2022-01-06 | Stop reason: HOSPADM

## 2022-01-06 RX ORDER — DEXAMETHASONE SODIUM PHOSPHATE 4 MG/ML
INJECTION, SOLUTION INTRA-ARTICULAR; INTRALESIONAL; INTRAMUSCULAR; INTRAVENOUS; SOFT TISSUE PRN
Status: DISCONTINUED | OUTPATIENT
Start: 2022-01-06 | End: 2022-01-06 | Stop reason: SDUPTHER

## 2022-01-06 RX ORDER — PROPOFOL 10 MG/ML
INJECTION, EMULSION INTRAVENOUS PRN
Status: DISCONTINUED | OUTPATIENT
Start: 2022-01-06 | End: 2022-01-06 | Stop reason: SDUPTHER

## 2022-01-06 RX ORDER — METOCLOPRAMIDE HYDROCHLORIDE 5 MG/ML
10 INJECTION INTRAMUSCULAR; INTRAVENOUS
Status: DISCONTINUED | OUTPATIENT
Start: 2022-01-06 | End: 2022-01-06 | Stop reason: HOSPADM

## 2022-01-06 RX ORDER — ONDANSETRON 2 MG/ML
4 INJECTION INTRAMUSCULAR; INTRAVENOUS EVERY 4 HOURS PRN
Status: DISCONTINUED | OUTPATIENT
Start: 2022-01-06 | End: 2022-01-06 | Stop reason: HOSPADM

## 2022-01-06 RX ORDER — PROMETHAZINE HYDROCHLORIDE 25 MG/ML
6.25 INJECTION, SOLUTION INTRAMUSCULAR; INTRAVENOUS
Status: DISCONTINUED | OUTPATIENT
Start: 2022-01-06 | End: 2022-01-06 | Stop reason: HOSPADM

## 2022-01-06 RX ORDER — TAMSULOSIN HYDROCHLORIDE 0.4 MG/1
CAPSULE ORAL
Status: COMPLETED
Start: 2022-01-06 | End: 2022-01-06

## 2022-01-06 RX ORDER — HYDROMORPHONE HYDROCHLORIDE 1 MG/ML
0.25 INJECTION, SOLUTION INTRAMUSCULAR; INTRAVENOUS; SUBCUTANEOUS EVERY 5 MIN PRN
Status: DISCONTINUED | OUTPATIENT
Start: 2022-01-06 | End: 2022-01-06 | Stop reason: HOSPADM

## 2022-01-06 RX ORDER — SODIUM CHLORIDE 9 MG/ML
INJECTION, SOLUTION INTRAVENOUS CONTINUOUS
Status: DISCONTINUED | OUTPATIENT
Start: 2022-01-06 | End: 2022-01-06 | Stop reason: HOSPADM

## 2022-01-06 RX ORDER — HYDROMORPHONE HYDROCHLORIDE 1 MG/ML
0.5 INJECTION, SOLUTION INTRAMUSCULAR; INTRAVENOUS; SUBCUTANEOUS
Status: DISCONTINUED | OUTPATIENT
Start: 2022-01-06 | End: 2022-01-06 | Stop reason: HOSPADM

## 2022-01-06 RX ORDER — MEPERIDINE HYDROCHLORIDE 25 MG/ML
12.5 INJECTION INTRAMUSCULAR; INTRAVENOUS; SUBCUTANEOUS EVERY 5 MIN PRN
Status: DISCONTINUED | OUTPATIENT
Start: 2022-01-06 | End: 2022-01-06 | Stop reason: HOSPADM

## 2022-01-06 RX ORDER — OXYCODONE HYDROCHLORIDE AND ACETAMINOPHEN 5; 325 MG/1; MG/1
2 TABLET ORAL EVERY 4 HOURS PRN
Status: DISCONTINUED | OUTPATIENT
Start: 2022-01-06 | End: 2022-01-06 | Stop reason: HOSPADM

## 2022-01-06 RX ORDER — HYDRALAZINE HYDROCHLORIDE 20 MG/ML
5 INJECTION INTRAMUSCULAR; INTRAVENOUS EVERY 10 MIN PRN
Status: DISCONTINUED | OUTPATIENT
Start: 2022-01-06 | End: 2022-01-06 | Stop reason: HOSPADM

## 2022-01-06 RX ORDER — FENTANYL CITRATE 50 UG/ML
INJECTION, SOLUTION INTRAMUSCULAR; INTRAVENOUS PRN
Status: DISCONTINUED | OUTPATIENT
Start: 2022-01-06 | End: 2022-01-06 | Stop reason: SDUPTHER

## 2022-01-06 RX ORDER — LIDOCAINE HYDROCHLORIDE 10 MG/ML
INJECTION, SOLUTION INFILTRATION; PERINEURAL PRN
Status: DISCONTINUED | OUTPATIENT
Start: 2022-01-06 | End: 2022-01-06 | Stop reason: SDUPTHER

## 2022-01-06 RX ORDER — DIPHENHYDRAMINE HYDROCHLORIDE 50 MG/ML
12.5 INJECTION INTRAMUSCULAR; INTRAVENOUS
Status: DISCONTINUED | OUTPATIENT
Start: 2022-01-06 | End: 2022-01-06 | Stop reason: HOSPADM

## 2022-01-06 RX ORDER — TAMSULOSIN HYDROCHLORIDE 0.4 MG/1
0.4 CAPSULE ORAL ONCE
Status: COMPLETED | OUTPATIENT
Start: 2022-01-06 | End: 2022-01-06

## 2022-01-06 RX ADMIN — PROPOFOL 200 MG: 10 INJECTION, EMULSION INTRAVENOUS at 13:07

## 2022-01-06 RX ADMIN — TAMSULOSIN HYDROCHLORIDE 0.4 MG: 0.4 CAPSULE ORAL at 14:44

## 2022-01-06 RX ADMIN — EPHEDRINE SULFATE 10 MG: 50 INJECTION INTRAMUSCULAR; INTRAVENOUS; SUBCUTANEOUS at 13:44

## 2022-01-06 RX ADMIN — SODIUM CHLORIDE, SODIUM LACTATE, POTASSIUM CHLORIDE, AND CALCIUM CHLORIDE: 600; 310; 30; 20 INJECTION, SOLUTION INTRAVENOUS at 12:02

## 2022-01-06 RX ADMIN — SODIUM CHLORIDE 1000 MG: 900 INJECTION INTRAVENOUS at 13:16

## 2022-01-06 RX ADMIN — PHENYLEPHRINE HYDROCHLORIDE 100 MCG: 10 INJECTION INTRAVENOUS at 13:34

## 2022-01-06 RX ADMIN — SUGAMMADEX 300 MG: 100 INJECTION, SOLUTION INTRAVENOUS at 14:18

## 2022-01-06 RX ADMIN — ONDANSETRON 4 MG: 2 INJECTION INTRAMUSCULAR; INTRAVENOUS at 14:07

## 2022-01-06 RX ADMIN — EPHEDRINE SULFATE 10 MG: 50 INJECTION INTRAMUSCULAR; INTRAVENOUS; SUBCUTANEOUS at 14:04

## 2022-01-06 RX ADMIN — EPHEDRINE SULFATE 10 MG: 50 INJECTION INTRAMUSCULAR; INTRAVENOUS; SUBCUTANEOUS at 13:39

## 2022-01-06 RX ADMIN — SODIUM CHLORIDE, SODIUM LACTATE, POTASSIUM CHLORIDE, AND CALCIUM CHLORIDE: 600; 310; 30; 20 INJECTION, SOLUTION INTRAVENOUS at 13:31

## 2022-01-06 RX ADMIN — EPHEDRINE SULFATE 20 MG: 50 INJECTION INTRAMUSCULAR; INTRAVENOUS; SUBCUTANEOUS at 13:20

## 2022-01-06 RX ADMIN — PHENYLEPHRINE HYDROCHLORIDE 100 MCG: 10 INJECTION INTRAVENOUS at 13:51

## 2022-01-06 RX ADMIN — SODIUM CHLORIDE, SODIUM LACTATE, POTASSIUM CHLORIDE, AND CALCIUM CHLORIDE: 600; 310; 30; 20 INJECTION, SOLUTION INTRAVENOUS at 13:01

## 2022-01-06 RX ADMIN — PHENYLEPHRINE HYDROCHLORIDE 100 MCG: 10 INJECTION INTRAVENOUS at 13:21

## 2022-01-06 RX ADMIN — EPHEDRINE SULFATE 10 MG: 50 INJECTION INTRAMUSCULAR; INTRAVENOUS; SUBCUTANEOUS at 13:18

## 2022-01-06 RX ADMIN — PHENYLEPHRINE HYDROCHLORIDE 100 MCG: 10 INJECTION INTRAVENOUS at 13:30

## 2022-01-06 RX ADMIN — ROCURONIUM BROMIDE 70 MG: 10 INJECTION, SOLUTION INTRAVENOUS at 13:08

## 2022-01-06 RX ADMIN — LIDOCAINE HYDROCHLORIDE 50 MG: 10 INJECTION, SOLUTION INFILTRATION; PERINEURAL at 13:06

## 2022-01-06 RX ADMIN — FENTANYL CITRATE 100 MCG: 50 INJECTION, SOLUTION INTRAMUSCULAR; INTRAVENOUS at 13:06

## 2022-01-06 RX ADMIN — DEXAMETHASONE SODIUM PHOSPHATE 4 MG: 4 INJECTION, SOLUTION INTRAMUSCULAR; INTRAVENOUS at 13:18

## 2022-01-06 ASSESSMENT — PAIN SCALES - GENERAL
PAINLEVEL_OUTOF10: 0

## 2022-01-06 ASSESSMENT — ENCOUNTER SYMPTOMS: SHORTNESS OF BREATH: 0

## 2022-01-06 ASSESSMENT — LIFESTYLE VARIABLES: SMOKING_STATUS: 0

## 2022-01-06 NOTE — ANESTHESIA POSTPROCEDURE EVALUATION
Department of Anesthesiology  Postprocedure Note    Patient: Oli Clark  MRN: [id number]  YOB: 1950  Date of evaluation: 1/6/2022  Time:  2:35 PM     Procedure Summary     Date: 01/06/22 Room / Location: [address]    Anesthesia Start: 1301 Anesthesia Stop: 1435    Procedure: LEFT RENAL EXTRACORPOREAL SHOCK WAVE LITHOTRIPSY (Left ) Diagnosis: (N20.0 - LEFT)    Surgeons: Meghann Bright MD Responsible Provider: SANTI Nguyen CRNA    Anesthesia Type: general ASA Status: 3          Anesthesia Type: general    Renny Phase I: Renny Score: 8    Renny Phase II:      Last vitals: Reviewed and per EMR flowsheets.        Anesthesia Post Evaluation    Patient location during evaluation: PACU  Patient participation: complete - patient participated  Level of consciousness: awake and alert  Pain score: 0  Airway patency: patent  Nausea & Vomiting: no nausea and no vomiting  Complications: no  Cardiovascular status: hemodynamically stable and blood pressure returned to baseline  Respiratory status: acceptable, spontaneous ventilation and nasal cannula  Hydration status: euvolemic

## 2022-01-06 NOTE — ANESTHESIA PRE PROCEDURE
Department of Anesthesiology  Preprocedure Note       Name:  Rebecca Phipps   Age:  73 y.o.  :  1950                                          MRN:  [id number]         Date:  2022      Surgeon: Rod Beasley):  Manasa Davis MD    Procedure: Procedure(s):  LEFT RENAL EXTRACORPOREAL SHOCK WAVE LITHOTRIPSY    Medications prior to admission:   Prior to Admission medications    Medication Sig Start Date End Date Taking? Authorizing Provider   diphenhydrAMINE (BENADRYL) 25 MG tablet Take 25 mg by mouth nightly as needed for Sleep    Historical Provider, MD   Enoxaparin Sodium (LOVENOX SC) Inject 1 Dose into the skin 2 times daily lovenox bridge; pt unsure of dose    Historical Provider, MD   dilTIAZem (CARDIZEM 12 HR) 120 MG extended release capsule Take 120 mg by mouth 2 times daily    Historical Provider, MD   lisinopril (PRINIVIL;ZESTRIL) 20 MG tablet Take 20 mg by mouth 2 times daily  9/10/21   Historical Provider, MD   tamsulosin (FLOMAX) 0.4 MG capsule Take 0.4 mg by mouth daily  21   Historical Provider, MD   diclofenac (VOLTAREN) 50 MG EC tablet Take 50 mg by mouth 2 times daily  21   Historical Provider, MD   CARTIA  MG extended release capsule TAKE 1 CAPSULE BY MOUTH EVERY DAY  Patient not taking: Reported on 2021   Historical Provider, MD   warfarin (COUMADIN) 1 MG tablet Take 5 mg by mouth daily     Historical Provider, MD   clotrimazole-betamethasone (LOTRISONE) 1-0.05 % cream Apply topically 2 times daily. 3/5/19   Bradley Bautista PA-C   bacitracin-polymyxin b (POLYSPORIN) 500-57062 UNIT/GM ointment Apply to incision TID  Patient not taking: Reported on 2021   Manasa Davis MD   betamethasone dipropionate (DIPROLENE) 0.05 % cream Apply topically 2 times daily.   Patient not taking: Reported on 2021   SANTI Bethea   metoprolol succinate (TOPROL XL) 25 MG extended release tablet Take 1 tablet by mouth daily 17   Bob Guerin APRN   Diphenhydramine-APAP, sleep, (TYLENOL PM EXTRA STRENGTH PO) Take 2 capsules by mouth nightly as needed     Historical Provider, MD       Current medications:    Current Facility-Administered Medications   Medication Dose Route Frequency Provider Last Rate Last Admin    lactated ringers infusion   IntraVENous Continuous Ibis Gunn MD        ertapenem Karime Manuel) 1000 mg IVPB minibag  1,000 mg IntraVENous Once Zorita Sable, APRN - CNP           Allergies: Allergies   Allergen Reactions    Codeine Other (See Comments)     Describes it as a sensitivity. Problem List:    Patient Active Problem List   Diagnosis Code    ACS (acute coronary syndrome) ([facility] I24.9    Family history of early CAD Z80.55    Essential hypertension I10    CAD (coronary artery disease) I25.10    Long term current use of anticoagulant therapy Z79.01    Renal calculus, right N20.0    Phimosis N47.1    Right ureteral calculus N20.1    Postoperative pain G89.18    UTI due to extended-spectrum beta lactamase (ESBL) producing Escherichia coli N39.0, B96.29, Z16.12       Past Medical History:        Diagnosis Date    ACS (acute coronary syndrome) ([facility] 4/27/2016 4/27/16  ACS  BRANDO RISK Score 1 (angina), AUC indication 3, AUC score 7 4/27/16  Cath  99% 1st diagonal, 2.25 x 15 YURIDIA, anterior lateral hypokinesis, EF 45%     CAD (coronary artery disease)     has seen dr. Millicent Crane in the past    Cerebral artery occlusion with cerebral infarction ([facility] [address] 1998    Difficulty voiding     per pt c/o.     Hyperlipidemia     Hypertension 4/27/2016    Kidney stone     MI (myocardial infarction) ([facility] [address]     stent    Renal calculus, right 8/1/2018    UTI (urinary tract infection)     due to stones; pt has been taking iv antibiotics at home; 1/5/22 last dose       Past Surgical History:        Procedure Laterality Date    CARDIAC CATHETERIZATION      COLONOSCOPY      CYSTOSCOPY INSERTION / REMOVAL STENT / STONE Right 8/28/2018    CYSTOSCOPY STENT REMOVAL performed by Barbara Kayser, MD at \[facility]"" 43 CIRCUMCISION,OTHER,28+ D/O N/A 8/21/2018    DORSAL SLIT performed by Barbara Kayser, MD at [facility] 67 W/LITHOTRIPSY &INDWELL STENT INSRT Right 8/21/2018    CYSTOSCOPY URETEROSCOPY LASER LITHOTRIPSY performed by Barbara Kayser, MD at [facility] ESWL Right 8/28/2018    ESWL EXTRACORPEAL SHOCK WAVE LITHOTRIPSY performed by Barbara Kayser, MD at [facility]       Social History:    Social History     Tobacco Use    Smoking status: Former Smoker     Packs/day: 3.00     Years: 35.00     Pack years: 105.00    Smokeless tobacco: Former User     Quit date: 4/27/2001   Substance Use Topics    Alcohol use: No                                Counseling given: Not Answered      Vital Signs (Current): There were no vitals filed for this visit.                                            BP Readings from Last 3 Encounters:   08/28/18 (!) 146/82   08/28/18 (!) 146/85   08/27/18 136/81       NPO Status:                                                                                 BMI:   Wt Readings from Last 3 Encounters:   01/05/22 280 lb (127 kg)   12/08/21 282 lb 6.4 oz (128.1 kg)   09/22/21 286 lb (129.7 kg)     There is no height or weight on file to calculate BMI.    CBC:   Lab Results   Component Value Date    WBC 5.1 01/05/2022    RBC 5.64 01/05/2022    HGB 15.1 01/05/2022    HCT 48.5 01/05/2022    MCV 86.0 01/05/2022    RDW 13.5 01/05/2022     01/05/2022       CMP:   Lab Results   Component Value Date     01/05/2022    K 4.7 01/05/2022     01/05/2022    CO2 25 01/05/2022    BUN 22 01/05/2022    CREATININE 1.7 01/05/2022    GFRAA 49 01/05/2022    LABGLOM 40 01/05/2022    GLUCOSE 96 01/05/2022    PROT 7.4 07/26/2018    CALCIUM 9.4 01/05/2022    BILITOT 0.4 07/26/2018    ALKPHOS 71 07/26/2018    AST 21 07/26/2018    ALT 25 07/26/2018       POC Tests: No results for input(s): POCGLU, POCNA, POCK, POCCL, POCBUN, POCHEMO, POCHCT in the last 72 hours. Coags:   Lab Results   Component Value Date    PROTIME 13.5 01/05/2022    INR 1.01 01/05/2022    APTT 33.5 01/05/2022       HCG (If Applicable): No results found for: PREGTESTUR, PREGSERUM, HCG, HCGQUANT     ABGs: No results found for: PHART, PO2ART, AIU8MYZ, TJA0UJV, BEART, W8LLJJCO     Type & Screen (If Applicable):  No results found for: LABABO, LABRH    Drug/Infectious Status (If Applicable):  No results found for: HIV, HEPCAB    COVID-19 Screening (If Applicable): No results found for: COVID19        Anesthesia Evaluation  Patient summary reviewed and Nursing notes reviewed no history of anesthetic complications:   Airway: Mallampati: II  TM distance: >3 FB   Neck ROM: full  Mouth opening: > = 3 FB Dental: normal exam   (+) upper dentures and lower dentures      Pulmonary:Negative Pulmonary ROS and normal exam  breath sounds clear to auscultation      (-) shortness of breath and not a current smoker          Patient did not smoke on day of surgery. Cardiovascular:    (+) hypertension:, past MI:, CAD:, CABG/stent (3/2016):,     (-)  angina and  CHF    NYHA Classification: I  ECG reviewed  Rhythm: regular  Rate: normal           Beta Blocker:  Not on Beta Blocker         Neuro/Psych:   Negative Neuro/Psych ROS  (+) CVA:,    (-) seizures and depression/anxiety            GI/Hepatic/Renal: Neg GI/Hepatic/Renal ROS  (+) renal disease: kidney stones, morbid obesity     (-) hiatal hernia and GERD       Endo/Other: Negative Endo/Other ROS             Pt had PAT visit. Abdominal:   (+) obese,     Abdomen: soft. Vascular: Other Findings:             Anesthesia Plan      general     ASA 3     (Iv zofran within 30 min of closing )  Induction: intravenous. BIS  MIPS: Postoperative opioids intended and Prophylactic antiemetics administered. Anesthetic plan and risks discussed with patient.     Use of blood products discussed with patient whom. Plan discussed with CRNA.     Attending anesthesiologist reviewed and agrees with Pre Eval content              Belinda Morley MD   1/6/2022

## 2022-01-06 NOTE — INTERVAL H&P NOTE
Update History & Physical    The patient's History and Physical of December 9, 2021 was reviewed with the patient and I examined the patient. There was no change. The surgical site was confirmed by the patient and me. Plan: The risks, benefits, expected outcome, and alternative to the recommended procedure have been discussed with the patient. Patient understands and wants to proceed with the procedure.      Electronically signed by Kamilah Culver MD on 1/6/2022 at 12:41 PM

## 2022-01-06 NOTE — OP NOTE
Brief operative Note      Patient: Mary Maciel  YOB: 1950  MRN: [id number]    Date of Procedure: 1/6/2022    Pre-Op Diagnosis: N20.0 - LEFT renal calculi    Post-Op Diagnosis: Same       Procedure(s):  LEFT RENAL EXTRACORPOREAL SHOCK WAVE LITHOTRIPSY    Surgeon(s):  Jada Callahan MD    Assistant:   * No surgical staff found *    Anesthesia: General    Estimated Blood Loss (mL): 0    Complications: None    Specimens:   * No specimens in log *    Implants:  * No implants in log *      Drains: * No LDAs found *    Findings: 3000 shockwaves total power level 7.  3 separate stones treated with 1000 shockwaves each stone.   Stones appear to disintegrate consistent with fragmentation    Detailed Description of Procedure:   See dictated:  [id number]    Disposition to PACU then to op care outpatient follow-up in 2 weeks with KUB    Electronically signed by Shay Gilbert MD on 1/6/2022 at 2:20 PM

## 2022-01-06 NOTE — PROGRESS NOTES
CLINICAL PHARMACY NOTE: MEDS TO BEDS    Total # of Prescriptions Filled: 1   The following medications were delivered to the patient:  · Norco 5/325 mg    Additional Documentation:    Handed script to patients wife and patient was alert as well in [facility]

## 2022-01-07 NOTE — OP NOTE
[facility]                 [address] [id number]                                OPERATIVE REPORT    PATIENT NAME: ALLIE TAYLOR                       :        1950  MED REC NO:   [id number]                              ROOM:  ACCOUNT NO:   [de-identified]                           ADMIT DATE: 2022  PROVIDER:     Юлия Baker MD    DATE OF PROCEDURE:  2022    TITLE OF OPERATION:  Left renal ESWL. PREOPERATIVE DIAGNOSIS:  Left renal calculi. POSTOPERATIVE DIAGNOSIS:  Left renal calculi. ANESTHETIC:  General anesthetic. ATTENDING SURGEON:  Юлия Baker MD    HISTORY:  The patient is a 42-year-old gentleman who has had difficulty  with recurrent urinary tract infections. He recently was treated with  IV Invanz for ESBL-positive E. coli. He has now finished his  antibiotics. A KUB has shown renal calculi; therefore, it was felt that  it was reasonable to try to get him stone-free to eliminate these as a  nidus or source of recurrent infection. He has 3 stones that can be  seen on KUB on the left side and therefore, we planned for left renal  ESWL. The risks and complications of the procedure were discussed with  him including the risk of injury to the kidney; failure to fragment the  stones; need for subsequent, adjuvant or repeat procedures; need to pass  the stone fragments; and need for intervention if they become  obstructing. He understands and agrees to proceed. DESCRIPTION OF PROCEDURE:  The patient was brought to the operating  room, was placed on the Storz lithotripsy table in the supine position  and underwent general anesthetic. The biplanar fluoroscopy was used to  localize the stones at the focal point. It was first started on the  upper pole stone. The patient did receive IV antibiotics consisting of  Invanz and a time-out was performed. Biplanar fluoroscopy was used to localize the upper pole stone. This  was treated with 1000 shock waves, power level up to 7. Then, the two  lower pole stones were then treated separately, each one with 1000 shock  waves. The patient received a total of 3000 shock waves. Each stone  appeared to become less dense and disintegrated consistent with  fragmentation. After completion of the procedure, the patient was  awakened from his anesthetic and taken to the recovery room in stable  condition. The estimated blood loss was 0 mL. He will follow up in 2  weeks with a KUB.         Sandeep Tobin MD    D: 01/06/2022 15:25:29      T: 01/06/2022 20:07:41     [email or web address]  Job#: [id number]     Doc#: [id number]    CC:

## 2022-02-22 ENCOUNTER — APPOINTMENT (OUTPATIENT)
Dept: NUCLEAR MEDICINE | Age: 72
DRG: 853 | End: 2022-02-22
Payer: MEDICARE

## 2022-02-22 ENCOUNTER — APPOINTMENT (OUTPATIENT)
Dept: GENERAL RADIOLOGY | Age: 72
DRG: 853 | End: 2022-02-22
Payer: MEDICARE

## 2022-02-22 ENCOUNTER — ANESTHESIA (OUTPATIENT)
Dept: OPERATING ROOM | Age: 72
DRG: 853 | End: 2022-02-22
Payer: MEDICARE

## 2022-02-22 ENCOUNTER — APPOINTMENT (OUTPATIENT)
Dept: CT IMAGING | Age: 72
DRG: 853 | End: 2022-02-22
Payer: MEDICARE

## 2022-02-22 ENCOUNTER — ANESTHESIA EVENT (OUTPATIENT)
Dept: OPERATING ROOM | Age: 72
DRG: 853 | End: 2022-02-22
Payer: MEDICARE

## 2022-02-22 ENCOUNTER — HOSPITAL ENCOUNTER (INPATIENT)
Age: 72
LOS: 6 days | Discharge: HOME | DRG: 853 | End: 2022-02-28
Attending: EMERGENCY MEDICINE | Admitting: INTERNAL MEDICINE
Payer: MEDICARE

## 2022-02-22 VITALS — TEMPERATURE: 98.9 F | DIASTOLIC BLOOD PRESSURE: 76 MMHG | OXYGEN SATURATION: 93 % | SYSTOLIC BLOOD PRESSURE: 124 MMHG

## 2022-02-22 DIAGNOSIS — R09.02 HYPOXIA: ICD-10-CM

## 2022-02-22 DIAGNOSIS — N20.0 KIDNEY STONE: ICD-10-CM

## 2022-02-22 DIAGNOSIS — N28.1 INFECTED KIDNEY CYST: ICD-10-CM

## 2022-02-22 DIAGNOSIS — A41.9 SEPTICEMIA (HCC): Primary | ICD-10-CM

## 2022-02-22 DIAGNOSIS — N10 ACUTE PYELONEPHRITIS: ICD-10-CM

## 2022-02-22 PROBLEM — N17.9 AKI (ACUTE KIDNEY INJURY) (HCC): Status: ACTIVE | Noted: 2022-02-22

## 2022-02-22 PROBLEM — N11.1 OBSTRUCTIVE PYELONEPHRITIS: Status: ACTIVE | Noted: 2022-02-22

## 2022-02-22 PROBLEM — N13.9 OBSTRUCTIVE UROPATHY: Status: ACTIVE | Noted: 2022-02-22

## 2022-02-22 LAB
ALBUMIN SERPL-MCNC: 2.9 G/DL (ref 3.5–5.2)
ALP BLD-CCNC: 103 U/L (ref 40–130)
ALT SERPL-CCNC: 35 U/L (ref 5–41)
ANION GAP SERPL CALCULATED.3IONS-SCNC: 13 MMOL/L (ref 7–19)
APTT: 29.8 SEC (ref 26–36.2)
AST SERPL-CCNC: 27 U/L (ref 5–40)
BACTERIA: ABNORMAL /HPF
BASE EXCESS ARTERIAL: -3.8 MMOL/L (ref -2–2)
BASOPHILS ABSOLUTE: 0 K/UL (ref 0–0.2)
BASOPHILS RELATIVE PERCENT: 0.4 % (ref 0–1)
BILIRUB SERPL-MCNC: 1.1 MG/DL (ref 0.2–1.2)
BILIRUBIN URINE: NEGATIVE
BLOOD, URINE: ABNORMAL
BUN BLDV-MCNC: 36 MG/DL (ref 8–23)
CALCIUM SERPL-MCNC: 8.8 MG/DL (ref 8.8–10.2)
CARBOXYHEMOGLOBIN ARTERIAL: 1.8 % (ref 0–5)
CHLORIDE BLD-SCNC: 103 MMOL/L (ref 98–111)
CLARITY: ABNORMAL
CO2: 21 MMOL/L (ref 22–29)
COLOR: YELLOW
CREAT SERPL-MCNC: 3.1 MG/DL (ref 0.5–1.2)
D DIMER: 3.87 UG/ML FEU (ref 0–0.48)
EOSINOPHILS ABSOLUTE: 0 K/UL (ref 0–0.6)
EOSINOPHILS RELATIVE PERCENT: 0 % (ref 0–5)
EPITHELIAL CELLS, UA: ABNORMAL /HPF
GFR AFRICAN AMERICAN: 24
GFR NON-AFRICAN AMERICAN: 20
GLUCOSE BLD-MCNC: 90 MG/DL (ref 74–109)
GLUCOSE URINE: NEGATIVE MG/DL
HCO3 ARTERIAL: 20.7 MMOL/L (ref 22–26)
HCT VFR BLD CALC: 44.5 % (ref 42–52)
HEMOGLOBIN, ART, EXTENDED: 13.3 G/DL (ref 14–18)
HEMOGLOBIN: 13.2 G/DL (ref 14–18)
IMMATURE GRANULOCYTES #: 0 K/UL
INR BLD: 1.41 (ref 0.88–1.18)
KETONES, URINE: NEGATIVE MG/DL
LACTIC ACID, SEPSIS: 2.2 MG/DL (ref 0.5–1.9)
LACTIC ACID, SEPSIS: 2.6 MG/DL (ref 0.5–1.9)
LACTIC ACID, SEPSIS: 2.7 MG/DL (ref 0.5–1.9)
LACTIC ACID, SEPSIS: 2.7 MG/DL (ref 0.5–1.9)
LEUKOCYTE ESTERASE, URINE: ABNORMAL
LYMPHOCYTES ABSOLUTE: 0.1 K/UL (ref 1.1–4.5)
LYMPHOCYTES RELATIVE PERCENT: 4.8 % (ref 20–40)
MCH RBC QN AUTO: 25.3 PG (ref 27–31)
MCHC RBC AUTO-ENTMCNC: 29.7 G/DL (ref 33–37)
MCV RBC AUTO: 85.4 FL (ref 80–94)
METHEMOGLOBIN ARTERIAL: 1.2 %
MONOCYTES ABSOLUTE: 0 K/UL (ref 0–0.9)
MONOCYTES RELATIVE PERCENT: 0.4 % (ref 0–10)
NEUTROPHILS ABSOLUTE: 2.6 K/UL (ref 1.5–7.5)
NEUTROPHILS RELATIVE PERCENT: 94 % (ref 50–65)
NITRITE, URINE: NEGATIVE
O2 CONTENT ARTERIAL: 17.9 ML/DL
O2 SAT, ARTERIAL: 95.2 %
O2 THERAPY: ABNORMAL
PCO2 ARTERIAL: 35 MMHG (ref 35–45)
PDW BLD-RTO: 13.6 % (ref 11.5–14.5)
PH ARTERIAL: 7.38 (ref 7.35–7.45)
PH UA: 5.5 (ref 5–8)
PLATELET # BLD: 193 K/UL (ref 130–400)
PMV BLD AUTO: 11.2 FL (ref 9.4–12.4)
PO2 ARTERIAL: 94 MMHG (ref 80–100)
POTASSIUM REFLEX MAGNESIUM: 4.5 MMOL/L (ref 3.5–5)
POTASSIUM, WHOLE BLOOD: 4.1
PRO-BNP: 235 PG/ML (ref 0–900)
PROTEIN UA: 100 MG/DL
PROTHROMBIN TIME: 17.3 SEC (ref 12–14.6)
RBC # BLD: 5.21 M/UL (ref 4.7–6.1)
RBC UA: ABNORMAL /HPF (ref 0–2)
SARS-COV-2, NAAT: NOT DETECTED
SODIUM BLD-SCNC: 137 MMOL/L (ref 136–145)
SPECIFIC GRAVITY UA: 1.01 (ref 1–1.03)
TOTAL PROTEIN: 7.7 G/DL (ref 6.6–8.7)
TROPONIN: <0.01 NG/ML (ref 0–0.03)
UROBILINOGEN, URINE: 1 E.U./DL
WBC # BLD: 2.7 K/UL (ref 4.8–10.8)
WBC UA: ABNORMAL /HPF (ref 0–5)

## 2022-02-22 PROCEDURE — 96365 THER/PROPH/DIAG IV INF INIT: CPT

## 2022-02-22 PROCEDURE — 84132 ASSAY OF SERUM POTASSIUM: CPT

## 2022-02-22 PROCEDURE — 6370000000 HC RX 637 (ALT 250 FOR IP): Performed by: INTERNAL MEDICINE

## 2022-02-22 PROCEDURE — 83880 ASSAY OF NATRIURETIC PEPTIDE: CPT

## 2022-02-22 PROCEDURE — 85610 PROTHROMBIN TIME: CPT

## 2022-02-22 PROCEDURE — 85730 THROMBOPLASTIN TIME PARTIAL: CPT

## 2022-02-22 PROCEDURE — C1769 GUIDE WIRE: HCPCS | Performed by: UROLOGY

## 2022-02-22 PROCEDURE — 87635 SARS-COV-2 COVID-19 AMP PRB: CPT

## 2022-02-22 PROCEDURE — 2580000003 HC RX 258: Performed by: INTERNAL MEDICINE

## 2022-02-22 PROCEDURE — 3600000004 HC SURGERY LEVEL 4 BASE: Performed by: UROLOGY

## 2022-02-22 PROCEDURE — 93005 ELECTROCARDIOGRAM TRACING: CPT | Performed by: PHYSICIAN ASSISTANT

## 2022-02-22 PROCEDURE — 36600 WITHDRAWAL OF ARTERIAL BLOOD: CPT

## 2022-02-22 PROCEDURE — 6370000000 HC RX 637 (ALT 250 FOR IP): Performed by: EMERGENCY MEDICINE

## 2022-02-22 PROCEDURE — 6360000002 HC RX W HCPCS: Performed by: NURSE PRACTITIONER

## 2022-02-22 PROCEDURE — 0T768DZ DILATION OF RIGHT URETER WITH INTRALUMINAL DEVICE, VIA NATURAL OR ARTIFICIAL OPENING ENDOSCOPIC: ICD-10-PCS | Performed by: UROLOGY

## 2022-02-22 PROCEDURE — 36415 COLL VENOUS BLD VENIPUNCTURE: CPT

## 2022-02-22 PROCEDURE — 6360000002 HC RX W HCPCS

## 2022-02-22 PROCEDURE — 83605 ASSAY OF LACTIC ACID: CPT

## 2022-02-22 PROCEDURE — 52332 CYSTOSCOPY AND TREATMENT: CPT | Performed by: UROLOGY

## 2022-02-22 PROCEDURE — 85025 COMPLETE CBC W/AUTO DIFF WBC: CPT

## 2022-02-22 PROCEDURE — 82803 BLOOD GASES ANY COMBINATION: CPT

## 2022-02-22 PROCEDURE — 74420 UROGRAPHY RTRGR +-KUB: CPT

## 2022-02-22 PROCEDURE — 87040 BLOOD CULTURE FOR BACTERIA: CPT

## 2022-02-22 PROCEDURE — 2709999900 HC NON-CHARGEABLE SUPPLY: Performed by: UROLOGY

## 2022-02-22 PROCEDURE — 3700000001 HC ADD 15 MINUTES (ANESTHESIA): Performed by: UROLOGY

## 2022-02-22 PROCEDURE — 7100000001 HC PACU RECOVERY - ADDTL 15 MIN: Performed by: UROLOGY

## 2022-02-22 PROCEDURE — 2500000003 HC RX 250 WO HCPCS

## 2022-02-22 PROCEDURE — C1758 CATHETER, URETERAL: HCPCS | Performed by: UROLOGY

## 2022-02-22 PROCEDURE — 7100000000 HC PACU RECOVERY - FIRST 15 MIN: Performed by: UROLOGY

## 2022-02-22 PROCEDURE — 84484 ASSAY OF TROPONIN QUANT: CPT

## 2022-02-22 PROCEDURE — 74150 CT ABDOMEN W/O CONTRAST: CPT

## 2022-02-22 PROCEDURE — 87186 SC STD MICRODIL/AGAR DIL: CPT

## 2022-02-22 PROCEDURE — 85379 FIBRIN DEGRADATION QUANT: CPT

## 2022-02-22 PROCEDURE — 51701 INSERT BLADDER CATHETER: CPT

## 2022-02-22 PROCEDURE — 78580 LUNG PERFUSION IMAGING: CPT

## 2022-02-22 PROCEDURE — 99284 EMERGENCY DEPT VISIT MOD MDM: CPT

## 2022-02-22 PROCEDURE — 87150 DNA/RNA AMPLIFIED PROBE: CPT

## 2022-02-22 PROCEDURE — 80053 COMPREHEN METABOLIC PANEL: CPT

## 2022-02-22 PROCEDURE — 6360000002 HC RX W HCPCS: Performed by: INTERNAL MEDICINE

## 2022-02-22 PROCEDURE — 2580000003 HC RX 258: Performed by: EMERGENCY MEDICINE

## 2022-02-22 PROCEDURE — 71045 X-RAY EXAM CHEST 1 VIEW: CPT

## 2022-02-22 PROCEDURE — 3700000000 HC ANESTHESIA ATTENDED CARE: Performed by: UROLOGY

## 2022-02-22 PROCEDURE — 99223 1ST HOSP IP/OBS HIGH 75: CPT | Performed by: NURSE PRACTITIONER

## 2022-02-22 PROCEDURE — 87086 URINE CULTURE/COLONY COUNT: CPT

## 2022-02-22 PROCEDURE — 2500000003 HC RX 250 WO HCPCS: Performed by: PHYSICIAN ASSISTANT

## 2022-02-22 PROCEDURE — 81001 URINALYSIS AUTO W/SCOPE: CPT

## 2022-02-22 PROCEDURE — 6360000002 HC RX W HCPCS: Performed by: EMERGENCY MEDICINE

## 2022-02-22 PROCEDURE — 2000000000 HC ICU R&B

## 2022-02-22 PROCEDURE — A9540 TC99M MAA: HCPCS | Performed by: PHYSICIAN ASSISTANT

## 2022-02-22 PROCEDURE — 3430000000 HC RX DIAGNOSTIC RADIOPHARMACEUTICAL: Performed by: PHYSICIAN ASSISTANT

## 2022-02-22 PROCEDURE — 3600000014 HC SURGERY LEVEL 4 ADDTL 15MIN: Performed by: UROLOGY

## 2022-02-22 PROCEDURE — C2617 STENT, NON-COR, TEM W/O DEL: HCPCS | Performed by: UROLOGY

## 2022-02-22 DEVICE — URETERAL STENT
Type: IMPLANTABLE DEVICE | Site: URETER | Status: FUNCTIONAL
Brand: POLARIS™ ULTRA

## 2022-02-22 RX ORDER — DILTIAZEM HYDROCHLORIDE 120 MG/1
120 CAPSULE, COATED, EXTENDED RELEASE ORAL DAILY
Status: DISCONTINUED | OUTPATIENT
Start: 2022-02-22 | End: 2022-02-28 | Stop reason: HOSPADM

## 2022-02-22 RX ORDER — PROPOFOL 10 MG/ML
INJECTION, EMULSION INTRAVENOUS PRN
Status: DISCONTINUED | OUTPATIENT
Start: 2022-02-22 | End: 2022-02-22 | Stop reason: SDUPTHER

## 2022-02-22 RX ORDER — SODIUM CHLORIDE, SODIUM LACTATE, POTASSIUM CHLORIDE, AND CALCIUM CHLORIDE .6; .31; .03; .02 G/100ML; G/100ML; G/100ML; G/100ML
30 INJECTION, SOLUTION INTRAVENOUS ONCE
Status: COMPLETED | OUTPATIENT
Start: 2022-02-22 | End: 2022-02-22

## 2022-02-22 RX ORDER — MEPERIDINE HYDROCHLORIDE 25 MG/ML
12.5 INJECTION INTRAMUSCULAR; INTRAVENOUS; SUBCUTANEOUS EVERY 5 MIN PRN
Status: DISCONTINUED | OUTPATIENT
Start: 2022-02-22 | End: 2022-02-22 | Stop reason: HOSPADM

## 2022-02-22 RX ORDER — CIPROFLOXACIN 2 MG/ML
400 INJECTION, SOLUTION INTRAVENOUS
Status: COMPLETED | OUTPATIENT
Start: 2022-02-22 | End: 2022-02-22

## 2022-02-22 RX ORDER — ACETAMINOPHEN 325 MG/1
650 TABLET ORAL ONCE
Status: COMPLETED | OUTPATIENT
Start: 2022-02-22 | End: 2022-02-22

## 2022-02-22 RX ORDER — MEROPENEM AND SODIUM CHLORIDE 1 G/50ML
1000 INJECTION, SOLUTION INTRAVENOUS EVERY 8 HOURS
Status: DISCONTINUED | OUTPATIENT
Start: 2022-02-22 | End: 2022-02-22 | Stop reason: SDUPTHER

## 2022-02-22 RX ORDER — SODIUM CHLORIDE 0.9 % (FLUSH) 0.9 %
5-40 SYRINGE (ML) INJECTION EVERY 12 HOURS SCHEDULED
Status: DISCONTINUED | OUTPATIENT
Start: 2022-02-22 | End: 2022-02-22 | Stop reason: HOSPADM

## 2022-02-22 RX ORDER — SODIUM CHLORIDE 0.9 % (FLUSH) 0.9 %
5-40 SYRINGE (ML) INJECTION PRN
Status: DISCONTINUED | OUTPATIENT
Start: 2022-02-22 | End: 2022-02-22 | Stop reason: HOSPADM

## 2022-02-22 RX ORDER — METOPROLOL SUCCINATE 25 MG/1
25 TABLET, EXTENDED RELEASE ORAL DAILY
Status: DISCONTINUED | OUTPATIENT
Start: 2022-02-22 | End: 2022-02-28 | Stop reason: HOSPADM

## 2022-02-22 RX ORDER — SODIUM CHLORIDE 9 MG/ML
INJECTION, SOLUTION INTRAVENOUS CONTINUOUS
Status: DISCONTINUED | OUTPATIENT
Start: 2022-02-22 | End: 2022-02-22 | Stop reason: SDUPTHER

## 2022-02-22 RX ORDER — SODIUM CHLORIDE 9 MG/ML
25 INJECTION, SOLUTION INTRAVENOUS PRN
Status: DISCONTINUED | OUTPATIENT
Start: 2022-02-22 | End: 2022-02-22 | Stop reason: HOSPADM

## 2022-02-22 RX ORDER — 0.9 % SODIUM CHLORIDE 0.9 %
20 INTRAVENOUS SOLUTION INTRAVENOUS ONCE
Status: COMPLETED | OUTPATIENT
Start: 2022-02-22 | End: 2022-02-22

## 2022-02-22 RX ORDER — NOREPINEPHRINE BIT/0.9 % NACL 16MG/250ML
.01-3.3 INFUSION BOTTLE (ML) INTRAVENOUS CONTINUOUS
Status: DISCONTINUED | OUTPATIENT
Start: 2022-02-22 | End: 2022-02-24

## 2022-02-22 RX ORDER — SODIUM CHLORIDE 9 MG/ML
INJECTION, SOLUTION INTRAVENOUS CONTINUOUS
Status: DISCONTINUED | OUTPATIENT
Start: 2022-02-22 | End: 2022-02-24

## 2022-02-22 RX ORDER — ROCURONIUM BROMIDE 10 MG/ML
INJECTION, SOLUTION INTRAVENOUS PRN
Status: DISCONTINUED | OUTPATIENT
Start: 2022-02-22 | End: 2022-02-22 | Stop reason: SDUPTHER

## 2022-02-22 RX ORDER — ACETAMINOPHEN 650 MG/1
650 SUPPOSITORY RECTAL EVERY 6 HOURS PRN
Status: DISCONTINUED | OUTPATIENT
Start: 2022-02-22 | End: 2022-02-28 | Stop reason: HOSPADM

## 2022-02-22 RX ORDER — MELATONIN 10 MG
10 CAPSULE ORAL NIGHTLY PRN
Status: DISCONTINUED | OUTPATIENT
Start: 2022-02-22 | End: 2022-02-28 | Stop reason: HOSPADM

## 2022-02-22 RX ORDER — SODIUM CHLORIDE 0.9 % (FLUSH) 0.9 %
5-40 SYRINGE (ML) INJECTION EVERY 12 HOURS SCHEDULED
Status: DISCONTINUED | OUTPATIENT
Start: 2022-02-22 | End: 2022-02-28 | Stop reason: HOSPADM

## 2022-02-22 RX ORDER — SUCCINYLCHOLINE CHLORIDE 20 MG/ML
INJECTION INTRAMUSCULAR; INTRAVENOUS PRN
Status: DISCONTINUED | OUTPATIENT
Start: 2022-02-22 | End: 2022-02-22 | Stop reason: SDUPTHER

## 2022-02-22 RX ORDER — METOCLOPRAMIDE HYDROCHLORIDE 5 MG/ML
10 INJECTION INTRAMUSCULAR; INTRAVENOUS
Status: DISCONTINUED | OUTPATIENT
Start: 2022-02-22 | End: 2022-02-22 | Stop reason: HOSPADM

## 2022-02-22 RX ORDER — ACETAMINOPHEN 325 MG/1
650 TABLET ORAL EVERY 6 HOURS PRN
Status: DISCONTINUED | OUTPATIENT
Start: 2022-02-22 | End: 2022-02-28 | Stop reason: HOSPADM

## 2022-02-22 RX ORDER — DIPHENHYDRAMINE HYDROCHLORIDE 50 MG/ML
12.5 INJECTION INTRAMUSCULAR; INTRAVENOUS
Status: DISCONTINUED | OUTPATIENT
Start: 2022-02-22 | End: 2022-02-22 | Stop reason: HOSPADM

## 2022-02-22 RX ORDER — LIDOCAINE HYDROCHLORIDE 10 MG/ML
INJECTION, SOLUTION EPIDURAL; INFILTRATION; INTRACAUDAL; PERINEURAL PRN
Status: DISCONTINUED | OUTPATIENT
Start: 2022-02-22 | End: 2022-02-22 | Stop reason: SDUPTHER

## 2022-02-22 RX ORDER — MEROPENEM AND SODIUM CHLORIDE 1 G/50ML
1000 INJECTION, SOLUTION INTRAVENOUS EVERY 24 HOURS
Status: DISCONTINUED | OUTPATIENT
Start: 2022-02-22 | End: 2022-02-28

## 2022-02-22 RX ORDER — 0.9 % SODIUM CHLORIDE 0.9 %
1000 INTRAVENOUS SOLUTION INTRAVENOUS ONCE
Status: DISCONTINUED | OUTPATIENT
Start: 2022-02-22 | End: 2022-02-22

## 2022-02-22 RX ORDER — SODIUM CHLORIDE 0.9 % (FLUSH) 0.9 %
5-40 SYRINGE (ML) INJECTION PRN
Status: DISCONTINUED | OUTPATIENT
Start: 2022-02-22 | End: 2022-02-28 | Stop reason: HOSPADM

## 2022-02-22 RX ORDER — DEXAMETHASONE SODIUM PHOSPHATE 10 MG/ML
INJECTION, SOLUTION INTRAMUSCULAR; INTRAVENOUS PRN
Status: DISCONTINUED | OUTPATIENT
Start: 2022-02-22 | End: 2022-02-22 | Stop reason: SDUPTHER

## 2022-02-22 RX ORDER — SODIUM CHLORIDE 9 MG/ML
25 INJECTION, SOLUTION INTRAVENOUS PRN
Status: DISCONTINUED | OUTPATIENT
Start: 2022-02-22 | End: 2022-02-28 | Stop reason: HOSPADM

## 2022-02-22 RX ORDER — FENTANYL CITRATE 50 UG/ML
INJECTION, SOLUTION INTRAMUSCULAR; INTRAVENOUS PRN
Status: DISCONTINUED | OUTPATIENT
Start: 2022-02-22 | End: 2022-02-22 | Stop reason: SDUPTHER

## 2022-02-22 RX ORDER — ONDANSETRON 2 MG/ML
INJECTION INTRAMUSCULAR; INTRAVENOUS PRN
Status: DISCONTINUED | OUTPATIENT
Start: 2022-02-22 | End: 2022-02-22 | Stop reason: SDUPTHER

## 2022-02-22 RX ADMIN — ONDANSETRON 4 MG: 2 INJECTION INTRAMUSCULAR; INTRAVENOUS at 20:24

## 2022-02-22 RX ADMIN — Medication 2 MCG/KG/MIN: at 19:41

## 2022-02-22 RX ADMIN — Medication 5 MILLICURIE: at 15:05

## 2022-02-22 RX ADMIN — LIDOCAINE HYDROCHLORIDE 100 MG: 10 INJECTION, SOLUTION EPIDURAL; INFILTRATION; INTRACAUDAL; PERINEURAL at 20:15

## 2022-02-22 RX ADMIN — SODIUM CHLORIDE 2486 ML: 9 INJECTION, SOLUTION INTRAVENOUS at 14:34

## 2022-02-22 RX ADMIN — FENTANYL CITRATE 100 MCG: 50 INJECTION, SOLUTION INTRAMUSCULAR; INTRAVENOUS at 20:09

## 2022-02-22 RX ADMIN — MEROPENEM AND SODIUM CHLORIDE 1000 MG: 1 INJECTION, SOLUTION INTRAVENOUS at 18:20

## 2022-02-22 RX ADMIN — ROCURONIUM BROMIDE 30 MG: 10 INJECTION, SOLUTION INTRAVENOUS at 20:24

## 2022-02-22 RX ADMIN — ACETAMINOPHEN 650 MG: 325 TABLET ORAL at 19:10

## 2022-02-22 RX ADMIN — SODIUM CHLORIDE 1000 ML: 9 INJECTION, SOLUTION INTRAVENOUS at 13:56

## 2022-02-22 RX ADMIN — SODIUM CHLORIDE: 9 INJECTION, SOLUTION INTRAVENOUS at 16:33

## 2022-02-22 RX ADMIN — SUCCINYLCHOLINE CHLORIDE 100 MG: 20 INJECTION, SOLUTION INTRAMUSCULAR; INTRAVENOUS at 20:15

## 2022-02-22 RX ADMIN — ACETAMINOPHEN 650 MG: 325 TABLET ORAL at 13:59

## 2022-02-22 RX ADMIN — PHENYLEPHRINE HYDROCHLORIDE 100 MCG: 10 INJECTION INTRAVENOUS at 20:21

## 2022-02-22 RX ADMIN — Medication 10 MG: at 23:21

## 2022-02-22 RX ADMIN — MEROPENEM AND SODIUM CHLORIDE 1000 MG: 1 INJECTION, SOLUTION INTRAVENOUS at 14:35

## 2022-02-22 RX ADMIN — SODIUM CHLORIDE, POTASSIUM CHLORIDE, SODIUM LACTATE AND CALCIUM CHLORIDE 1000 ML: 600; 310; 30; 20 INJECTION, SOLUTION INTRAVENOUS at 18:18

## 2022-02-22 RX ADMIN — SUGAMMADEX 300 MG: 100 INJECTION, SOLUTION INTRAVENOUS at 20:36

## 2022-02-22 RX ADMIN — PHENYLEPHRINE HYDROCHLORIDE 200 MCG: 10 INJECTION INTRAVENOUS at 20:26

## 2022-02-22 RX ADMIN — CIPROFLOXACIN 400 MG: 2 INJECTION, SOLUTION INTRAVENOUS at 20:22

## 2022-02-22 RX ADMIN — DEXAMETHASONE SODIUM PHOSPHATE 10 MG: 10 INJECTION, SOLUTION INTRAMUSCULAR; INTRAVENOUS at 20:24

## 2022-02-22 RX ADMIN — PROPOFOL 160 MG: 10 INJECTION, EMULSION INTRAVENOUS at 20:15

## 2022-02-22 ASSESSMENT — ENCOUNTER SYMPTOMS
VOICE CHANGE: 0
SHORTNESS OF BREATH: 0
EYE ITCHING: 0
APNEA: 0
ABDOMINAL PAIN: 1
SHORTNESS OF BREATH: 1
ABDOMINAL DISTENTION: 0
CONSTIPATION: 0
COUGH: 0
VOMITING: 0
NAUSEA: 1
EYE DISCHARGE: 0
PHOTOPHOBIA: 0
BACK PAIN: 0
DIARRHEA: 0
SHORTNESS OF BREATH: 1
COLOR CHANGE: 0

## 2022-02-22 ASSESSMENT — PAIN DESCRIPTION - DESCRIPTORS: DESCRIPTORS: CONSTANT

## 2022-02-22 ASSESSMENT — PAIN SCALES - GENERAL
PAINLEVEL_OUTOF10: 0
PAINLEVEL_OUTOF10: 0
PAINLEVEL_OUTOF10: 2
PAINLEVEL_OUTOF10: 8

## 2022-02-22 ASSESSMENT — PAIN DESCRIPTION - LOCATION: LOCATION: ABDOMEN

## 2022-02-22 ASSESSMENT — PAIN DESCRIPTION - DIRECTION: RADIATING_TOWARDS: BACK

## 2022-02-22 ASSESSMENT — PAIN DESCRIPTION - ORIENTATION: ORIENTATION: RIGHT

## 2022-02-22 ASSESSMENT — PAIN DESCRIPTION - FREQUENCY: FREQUENCY: CONTINUOUS

## 2022-02-22 ASSESSMENT — PAIN DESCRIPTION - ONSET: ONSET: ON-GOING

## 2022-02-22 ASSESSMENT — PAIN DESCRIPTION - PAIN TYPE: TYPE: ACUTE PAIN

## 2022-02-22 ASSESSMENT — PAIN DESCRIPTION - PROGRESSION: CLINICAL_PROGRESSION: GRADUALLY IMPROVING

## 2022-02-22 ASSESSMENT — PAIN - FUNCTIONAL ASSESSMENT: PAIN_FUNCTIONAL_ASSESSMENT: PREVENTS OR INTERFERES SOME ACTIVE ACTIVITIES AND ADLS

## 2022-02-22 NOTE — CONSULTS
Urology Consult Note    Patient:  Hal Fung  YOB: 1950  Date of Service: 2/22/2022  MRN: [id number]   Acct: [de-identified]   Primary Care Physician: Aisha Kawasaki, MD  Advance Directive: Prior  Admit Date: 2/22/2022       Hospital Day: 0    Chief Complaint: \" I have felt terrible for a long time now. \"    History:  HPI   Patient is well-known to our practice and has had several recurrent UTIs. In an attempt to make the patient stone free and eliminate these as a possible source of infection, he underwent a left ESWL on 1/6/2022. Bilateral nephrolithiasis was noted at the time of surgery, however, stone burden was greater on the left. She reports he has been having several issues since his surgery. His wife reports about 2 weeks after surgery, he developed a sudden fever. He was evaluated at [facility] and received IV antibiotics for what was likely ESBL E. coli infection. She reports patiently typically recovers well after receiving IV antibiotics, however, at this time he continued to feel weak and unwell. Finally, on the fifth of this month, he returned to the hospital for further evaluation and he was admitted to the hospital again for recurrent ESBL UTI. Patient was also experiencing some right-sided flank pain as well as intermittent nausea without vomiting. Patient wife reports there were some x-ray images taken, but she is not sure if a CT was performed during this hospitalization. She reports at that time Dr. Sylwia Manuel was planning on referring the patient to [address] [place] [address], but since was out of his network, she referred him to infectious disease here in [place] instead. He was discharged from the facility about 2 weeks ago. Patient presents to the ER by way of EMS transport secondary to extreme weakness and shortness of breath. His wife reports he called her this morning and stated \"I feel like I am going to die\".   He continues to complain of right-sided flank and lower quadrant pain. Associated symptoms include intermittent fevers, chills, nausea without vomiting. Vitals in the emergency room revealed tachycardia, hypotension, labored breathing. He is currently being treated with Caroline Solis secondary to his history of ESBL infection. He has been afebrile since arriving at the facility, however, he is experiencing chills during bedside evaluation. CT reveals a right proximal ureteral stone near the level of L4 with mild to moderate hydronephrosis. There also shows some significant interval growth of a right renal cyst and some question of infection as there appears to be air bubbles within the cyst that were not present on previous imaging. Catheterized urine specimen microscopically reveals 3+ bacteria, too numerous to count white blood cells, increased red blood cells. A significant increase in his creatinine from 1.7 to 3.1 is also noted with a drop in GFR from 40-20. Lactic acid upon admission is elevated at 2.7. He is Covid negative. Urine and blood cultures have been collected. Past Medical Hx:   Past Medical History:   Diagnosis Date    ACS (acute coronary syndrome) ([facility] 4/27/2016 4/27/16  ACS  BRANDO RISK Score 1 (angina), AUC indication 3, AUC score 7 4/27/16  Cath  99% 1st diagonal, 2.25 x 15 YURIDIA, anterior lateral hypokinesis, EF 45%     CAD (coronary artery disease)     has seen dr. Crane in the past    Cerebral artery occlusion with cerebral infarction ([facility] [address] 1998    Difficulty voiding     per pt c/o.     Hyperlipidemia     Hypertension 4/27/2016    Kidney stone     MI (myocardial infarction) ([facility] [address]     stent    Renal calculus, right 8/1/2018    UTI (urinary tract infection)     due to stones; pt has been taking iv antibiotics at home; 1/5/22 last dose     Past Surgical Hx:   Past Surgical History:   Procedure Laterality Date    CARDIAC CATHETERIZATION      COLONOSCOPY      CYSTOSCOPY INSERTION / REMOVAL Obdulia Dickey / STONE Right 8/28/2018 CYSTOSCOPY STENT REMOVAL performed by Angelique Silva MD at [address] LITHOTRIPSY Left 1/6/2022    LEFT RENAL EXTRACORPOREAL SHOCK WAVE LITHOTRIPSY performed by Angelique Silva MD at [address] CIRCUMCISION,OTHER,28+ D/O N/A 8/21/2018    DORSAL SLIT performed by Angelique Silva MD at [facility] [address]/LITHOTRIPSY &INDWELL STENT INSRT Right 8/21/2018    CYSTOSCOPY URETEROSCOPY LASER LITHOTRIPSY performed by Angelique Silva MD at [facility] ESWL Right 8/28/2018    ESWL [address] LITHOTRIPSY performed by Angelique Silva MD at [facility] OR     Social Hx:   Social History     Socioeconomic History    Marital status:      Spouse name: jessica gunn    Number of children: 2    Years of education: Not on file    Highest education level: Not on file   Occupational History    Occupation: farmer    Tobacco Use    Smoking status: Former Smoker     Packs/day: 3.00     Years: 35.00     Pack years: 105.00    Smokeless tobacco: Former User     Quit date: 4/27/2001   Vaping Use    Vaping Use: Never used   Substance and Sexual Activity    Alcohol use: No    Drug use: No    Sexual activity: Yes     Partners: Female   Other Topics Concern    Not on file   Social History Narrative    Not on file     Social Determinants of Health     Financial Resource Strain:     Difficulty of Paying Living Expenses: Not on file   Food Insecurity:     Worried About [address] in the Last Year: Not on file    [address] in the Last Year: Not on file   Transportation Needs:     Lack of Transportation (Medical): Not on file    Lack of Transportation (Non-Medical):  Not on file   Physical Activity:     Days of Exercise per Week: Not on file    Minutes of Exercise per Session: Not on file   Stress:     Feeling of Stress : Not on file   Social Connections:     Frequency of Communication with Friends and Family: Not on file    Frequency of Social Gatherings with Friends and Family: Not on file    Attends Orthodox Services: Not on file    Active Member of Clubs or Organizations: Not on file    Attends Club or Organization Meetings: Not on file    Marital Status: Not on file   Intimate Partner Violence:     Fear of Current or Ex-Partner: Not on file    Emotionally Abused: Not on file    Physically Abused: Not on file    Sexually Abused: Not on file   Housing Stability:     Unable to Pay for Housing in the Last Year: Not on file    Number of [place] in the Last Year: Not on file    Unstable Housing in the Last Year: Not on file     Family Hx:   Family History   Problem Relation Age of Onset    Cancer Sister     Cancer Brother     Heart Disease Father        Allergies/Medications  Allergies: Codeine  Home Medications:   Prior to Admission medications    Medication Sig Start Date End Date Taking? Authorizing Provider   diphenhydrAMINE (BENADRYL) 25 MG tablet Take 25 mg by mouth nightly as needed for Sleep    Historical Provider, MD   Enoxaparin Sodium (LOVENOX SC) Inject 40 mg into the skin 2 times daily lovenox bridge; pt unsure of dose     Historical Provider, MD   dilTIAZem (CARDIZEM 12 HR) 120 MG extended release capsule Take 120 mg by mouth 2 times daily    Historical Provider, MD   lisinopril (PRINIVIL;ZESTRIL) 20 MG tablet Take 20 mg by mouth 2 times daily  9/10/21   Historical Provider, MD   tamsulosin (FLOMAX) 0.4 MG capsule Take 0.4 mg by mouth daily  8/12/21   Historical Provider, MD   diclofenac (VOLTAREN) 50 MG EC tablet Take 50 mg by mouth 2 times daily  5/16/21   Historical Provider, MD   CARTIA  MG extended release capsule TAKE 1 CAPSULE BY MOUTH EVERY DAY  Patient not taking: Reported on 12/8/2021 4/14/21   Historical Provider, MD   warfarin (COUMADIN) 1 MG tablet Take 5 mg by mouth daily     Historical Provider, MD   clotrimazole-betamethasone (LOTRISONE) 1-0.05 % cream Apply topically 2 times daily.  3/5/19   Janie Palm Uriel Atkinson PA-C   bacitracin-polymyxin b (POLYSPORIN) [id number] UNIT/GM ointment Apply to incision TID  Patient not taking: Reported on 2021   Yash López MD   betamethasone dipropionate (DIPROLENE) 0.05 % cream Apply topically 2 times daily. Patient not taking: Reported on 2021   SANTI Pelaez   metoprolol succinate (TOPROL XL) 25 MG extended release tablet Take 1 tablet by mouth daily 17   SANTI Carter   Diphenhydramine-APAP, sleep, (TYLENOL PM EXTRA STRENGTH PO) Take 2 capsules by mouth nightly as needed     Historical Provider, MD       Review of Systems:   Review of Systems   Constitutional: Positive for appetite change, chills, fatigue and fever (Afebrile since arrival to hospital). Respiratory: Positive for shortness of breath. Gastrointestinal: Positive for abdominal pain (Right lower quadrant) and nausea. Negative for abdominal distention and vomiting. Genitourinary: Positive for flank pain (Right). Negative for difficulty urinating, dysuria, frequency, hematuria and urgency. Musculoskeletal: Negative for back pain and gait problem. Skin: Positive for pallor. Neurological: Positive for weakness. Psychiatric/Behavioral: Negative for agitation and confusion. Vitals:  BP (!) 70/54   Pulse 109   Temp 98.8 °F (37.1 °C)   Resp 18   Ht 6' 3\" (1.905 m)   Wt 274 lb (124.3 kg)   SpO2 (!) 79%   BMI 34.25 kg/m²   Temp  Av °F (37.2 °C)  Min: 98.3 °F (36.8 °C)  Max: 99.9 °F (37.7 °C)  No intake or output data in the 24 hours ending 22 1722      Physical:     Physical Exam  Vitals and nursing note reviewed. Constitutional:       Appearance: Normal appearance. He is ill-appearing. HENT:      Mouth/Throat:      Mouth: Mucous membranes are dry. Pulmonary:      Effort: No respiratory distress (No distress, but mildly labored breathing). Abdominal:      General: There is no distension. Tenderness:  There is no abdominal aorta with ectasia to measurement of 3.2 cm Signed by Dr Blayne Nicole    XR CHEST PORTABLE    Result Date: 2/22/2022  1. No acute disease. Signed by Dr Araceli Garcia        Impression/Plan:     1. Sepsis. Likely from a urologic source. Plan to intervene with immediate surgical intervention. 2. Right proximal ureteral stone near the level of L4. Given the patient's condition, we will proceed with intervention tonight with cystoscopy, right ureteroscopy with right ureteral stent placement. Definitive stone treatment will likely not be possible given the infection. I explained to the patient and his wife we would plan to treat with culture specific antibiotics and definitively treat the stone at a later date. They voiced understanding. 3.  Right renal cyst, possible infection. Moderate to severe stranding surrounding the right kidney. Significant interval growth of a right renal cyst which now appears infected given the presence of air bubbles within the cyst that were not present on previous imaging. I have alerted Dr. Kwadwo Contreras to review images for further plan of action regarding this finding. 4.  GUICHO, likely secondary to right proximal ureteral stone. Again, plan to intervene tonight with stent placement.       DION ESCALONA, APRN - CNP  2/22/2022 5:22 PM

## 2022-02-22 NOTE — H&P
[facility] Medicine H&P    Patient:  Vivi Sierra  MRN: [id number]    Consulting Physician: Mamie Cage DO  Reason for Consult: Medical Management  Primacy Care Physician: Edith Sanabria MD    HISTORY OF PRESENT ILLNESS:   The patient is a 73 y.o. male presents with worsening right flank pain, diffuse nausea, and weakness. He has a longstanding history of nephrolithiasis. He just recently underwent ESWL therapy on 1/6 for multiple stones in the left side. He presents now with the above-noted symptoms and is shown to have a stone with some evidence of obstruction on the right. He is also hypotensive. He will be admitted to the ICU. Urology has been consulted. He is tentatively going to the operating room for retrograde stenting this evening. His baseline creatinine is between 1.3 and 1.7 mg percent. Today it is greater than 3 mg percent on presentation. Past Medical History:        Diagnosis Date    ACS (acute coronary syndrome) ([facility] [address] 4/27/2016 4/27/16  ACS  BRANDO RISK Score 1 (angina), AUC indication 3, AUC score 7 4/27/16  Cath  99% 1st diagonal, 2.25 x 15 YURIDIA, anterior lateral hypokinesis, EF 45%     CAD (coronary artery disease)     has seen dr. Guillermo Mena in the past    Cerebral artery occlusion with cerebral infarction ([address] 1998    Difficulty voiding     per pt c/o.     Hyperlipidemia     Hypertension 4/27/2016    Kidney stone     MI (myocardial infarction) ([facility] [address]     stent    Renal calculus, right 8/1/2018    UTI (urinary tract infection)     due to stones; pt has been taking iv antibiotics at home; 1/5/22 last dose       Past Surgical History:        Procedure Laterality Date    CARDIAC CATHETERIZATION      COLONOSCOPY      CYSTOSCOPY INSERTION / REMOVAL STENT / STONE Right 8/28/2018    CYSTOSCOPY STENT REMOVAL performed by Jaquan Schwartz MD at [address] [place] [address] LITHOTRIPSY Left 1/6/2022    LEFT RENAL EXTRACORPOREAL SHOCK WAVE LITHOTRIPSY performed by Jaquan Schwartz MD at [address] [place] [address] MD CIRCUMCISION,OTHER,28+ D/O N/A 8/21/2018    DORSAL SLIT performed by Royce Santillan MD at [address] &INDWELL STENT INSRT Right 8/21/2018    CYSTOSCOPY URETEROSCOPY LASER LITHOTRIPSY performed by Royce Santillan MD at [facility] ESWL Right 8/28/2018    ESWL EXTRACORPEAL SHOCK WAVE LITHOTRIPSY performed by Royce Santillan MD at [address] [facility] OR       Medications: Scheduled Meds:   meropenem  1,000 mg IntraVENous Q8H     Continuous Infusions:   sodium chloride 100 mL/hr at 02/22/22 1633     PRN Meds:. Allergies:  Codeine    Social History:   TOBACCO:   reports that he has quit smoking. He has a 105.00 pack-year smoking history. He quit smokeless tobacco use about 20 years ago. ETOH:   reports no history of alcohol use. Family History:       Problem Relation Age of Onset    Cancer Sister     Cancer Brother     Heart Disease Father        ROS:  Review of Systems   Constitutional: Positive for activity change. Negative for fever. HENT: Negative for congestion and voice change. Eyes: Negative for visual disturbance. Respiratory: Negative for shortness of breath. Cardiovascular: Negative for chest pain and leg swelling. Gastrointestinal: Positive for nausea. Negative for constipation, diarrhea and vomiting. Endocrine: Negative for polyuria. Genitourinary: Positive for difficulty urinating and flank pain. Negative for dysuria. Musculoskeletal: Negative for back pain and neck pain. Skin: Negative for color change. Allergic/Immunologic: Negative for immunocompromised state. Neurological: Positive for weakness. Negative for dizziness and light-headedness. Psychiatric/Behavioral: The patient is not nervous/anxious.         Physical Exam:    Vitals: BP (!) 70/54   Pulse 109   Temp 98.8 °F (37.1 °C)   Resp 18   Ht 6' 3\" (1.905 m)   Wt 274 lb (124.3 kg)   SpO2 (!) 79%   BMI 34.25 kg/m²     Physical Exam  Vitals and nursing note reviewed. Constitutional:       Appearance: He is ill-appearing. HENT:      Head: Normocephalic and atraumatic. Right Ear: External ear normal.      Left Ear: External ear normal.      Nose: Nose normal.      Mouth/Throat:      Mouth: Mucous membranes are moist.   Eyes:      Conjunctiva/sclera: Conjunctivae normal.      Pupils: Pupils are equal, round, and reactive to light. Cardiovascular:      Rate and Rhythm: Normal rate and regular rhythm. Heart sounds: Normal heart sounds. Pulmonary:      Effort: Pulmonary effort is normal.      Breath sounds: Normal breath sounds. Abdominal:      General: Abdomen is flat. Palpations: Abdomen is soft. Musculoskeletal:         General: No swelling. Cervical back: Neck supple. No rigidity. Skin:     General: Skin is warm and dry. Neurological:      Mental Status: He is oriented to person, place, and time. Psychiatric:         Mood and Affect: Mood normal.         Thought Content: Thought content normal.         CBC:   Recent Labs     02/22/22  1256   WBC 2.7*   HGB 13.2*        BMP:    Recent Labs     02/22/22  1256 02/22/22  1340     --    K 4.5 4.1     --    CO2 21*  --    BUN 36*  --    CREATININE 3.1*  --    GLUCOSE 90  --      Hepatic:   Recent Labs     02/22/22  1256   AST 27   ALT 35   BILITOT 1.1   ALKPHOS 103     Troponin:   Recent Labs     02/22/22  1256   TROPONINI <0.01     BNP: No results for input(s): BNP in the last 72 hours. Lipids: No results for input(s): CHOL, HDL in the last 72 hours. Invalid input(s): LDLCALCU  ABGs:   Lab Results   Component Value Date    PHART 7.380 02/22/2022    PO2ART 94.0 02/22/2022    PCY4YAM 35.0 02/22/2022     INR:   Recent Labs     02/22/22  1256   INR 1.41*     -----------------------------------------------------------------  NM LUNG SCAN PERFUSION ONLY    Result Date: 2/22/2022  History: Hypoxia Nuclear medicine perfusion scan:  Following IV injection of 5.5 mCi of technetium 99m MAA particles, multiple projectional images the chest were obtained. COMPARISON: Chest x-ray 2/22/2022 FINDINGS: There is homogeneous tracer activity of the bilateral lung parenchyma with no segmental perfusion defect. Findings consistent with very low probability of pulmonary emboli. 1. Very low probability of pulmonary emboli. Signed by Dr Beck Osullivan    Result Date: 2/22/2022  CT abdomen and pelvis 2/22/2022 2:55 PM HISTORY: Right flank pain, renal lithotripsy 1/6/2022 TECHNIQUE: Axial images of the abdomen and pelvis were obtained without IV contrast. Coronal and sagittal reformatted images are reconstructed and reviewed. COMPARISON: 8/12/2018. DLP: 1626 mGy cm Automated exposure control was utilized to minimize patient radiation dose. FINDINGS: There is a 5 mm right proximal to mid ureteral stone positioned at the L4 level resulting in mild to moderate right-sided hydronephrosis. There are a few punctate nonobstructing bilateral intrarenal stones. There is left renal atrophy. Inseparable from the inferior pole of the right kidney at the site of a previous 2.6 cm simple appearing cyst, there is a 4.4 x 4.5 x 4.4 cm hypodense collection with a few regional air bubbles is stranding of the adjacent perinephric fat as well as right so as muscle. Finding is concerning for an infected renal cyst. Bladder is under distended. Prostate does not appear enlarged. Fatty-containing left inguinal hernia. Probable hepatic steatosis. The nonenhanced liver, spleen, pancreas, gallbladder, and adrenal glands are otherwise unremarkable. No free intraperitoneal air or loculated abscess. Normal appendix. No bowel obstruction. Decompressed stomach. Mild vascular calcification with ectasia of the aorta to maximum measurement of 3.2 cm. No pathologic lymphadenopathy identified. Atelectasis of the visible lung bases. No focal destructive osseous lesions.     1. There is a 5 mm stone within the right proximal to mid ureter positioned at the L4 level creating mild to moderate right-sided hydronephrosis. 2. There is an increasing size cystic lesion/collection within the inferior pole of the right kidney at the site of a prior 2.6 cm renal cyst now measuring 4.5 cm containing few air bubbles with adjacent perinephric and right psoas stranding concerning for an infected right renal cyst. 3. Hepatic steatosis. 4. Atherosclerotic changes of the aorta with ectasia to measurement of 3.2 cm Signed by Dr Pino Hess    XR CHEST PORTABLE    Result Date: 2/22/2022  XR CHEST PORTABLE 2/22/2022 2:40 PM History: Short of breath. Confusion. Portable chest x-ray. No comparison. Heart size is within normal limits. The mediastinum has a normal appearance. The lungs are adequately expanded with no pneumonia or pneumothorax. There is mild chronic interstitial disease with scattered calcified granulomas. There is no significant pleural fluid. No congestive failure changes. 1. No acute disease. Signed by Dr Dany Urena and Plan     Obstructive uropathy. Consult urology. Tentatively to the operating room for retrograde stenting this evening. Sepsis secondary to #1. He has a history of ESBL. Continue meropenem. Pressor support    Acute kidney injury secondary to #1. Fluid resuscitation. Please document 35 minutes of critical care time for patient assessment, chart review, discussion with staff, .       La Boyle DO

## 2022-02-22 NOTE — ED NOTES
Bed: 03  Expected date:   Expected time:   Means of arrival:   Comments:  EMS: Tana Self RN  02/22/22 4664

## 2022-02-22 NOTE — ED PROVIDER NOTES
[facility] EMERGENCY DEPT  eMERGENCY dEPARTMENT eNCOUnter      Pt Name: Quentin Hays  MRN: [id number]  [place] 1950  Date of evaluation: 2/22/2022  Provider: Reina Reid MD    [address] [place] [address]       Chief Complaint   Patient presents with    Shortness of Breath    Altered Mental Status    Urinary Tract Infection   Patient with back pain shortness of breath and recurrent urinary tract infection. History of kidney stones in the past with recent lithotripsy in January with Dr. Janee Turner. He is blood pressure was borderline in the upper nineties his sat was borderline in the lower nineties. He is on 5 L of oxygen at this time. Has had ESBL E. coli in the past in his urine. Patient is ill-appearing. Is given IV fluids IV antibiotics and Tylenol. Will make sure he does not have any sort of blocking stone given he is having some bilateral flank pain. Rule out a PE as he has been in the hospital recently several times and hypoxic here, not on home oxygen. He is on Coumadin but not therapeutic. GUICHO with cr of 3.1    bp improved briefly with iVF, then lowered down to 70s, repeat IVF bolus ordered. NO PE on imaging, has renal cyst infection and infected stone as well. Dr Janee Turner has been consulted and taking to the OR  Pt to be admitted to ICU.   Dr Aarti Elkins consulted for ICU    PHYSICAL EXAM    (up to 7 for level 4, 8 or more for level 5)     ED Triage Vitals [02/22/22 1246]   BP Temp Temp src Pulse Resp SpO2 Height Weight   99/70 98.3 °F (36.8 °C) -- 125 22 93 % 6' 3\" (1.905 m) 274 lb (124.3 kg)       Physical Exam    DIAGNOSTIC RESULTS     EKG: All EKG's are interpreted by theEmergency Department Physician who either signs or Co-signs this chart in the absence of a cardiologist.        RADIOLOGY:   Non-plain film images such as CT, Ultrasound and MRI are read by the radiologist. Plain radiographic images are visualized and preliminarily interpreted by the emergencyphysician with the below findings:        Interpretation per the Radiologist below, if available at the time of thisnote:    2050 [place]   Final Result   1. Very low probability of pulmonary emboli. Signed by Dr Richar Beatty   Final Result   1. There is a 5 mm stone within the right proximal to mid ureter   positioned at the L4 level creating mild to moderate right-sided   hydronephrosis. 2. There is an increasing size cystic lesion/collection within the   inferior pole of the right kidney at the site of a prior 2.6 cm renal   cyst now measuring 4.5 cm containing few air bubbles with adjacent   perinephric and right psoas stranding concerning for an infected right   renal cyst.   3. Hepatic steatosis. 4. Atherosclerotic changes of the aorta with ectasia to measurement of   3.2 cm   Signed by Dr Roxie Rodarte      XR CHEST PORTABLE   Final Result   1. No acute disease.    Signed by Dr Jose R Kamara            ED BEDSIDE ULTRASOUND:   Performed by ED Physician - none    LABS:  Labs Reviewed   LACTATE, SEPSIS - Abnormal; Notable for the following components:       Result Value    Lactic Acid, Sepsis 2.7 (*)     All other components within normal limits    Narrative:     Galileo Ramos tel. ,  Chemistry results called to and read back by Chineyre Martinez RN/ER, 02/22/2022 13:26,  by LARA   CBC WITH AUTO DIFFERENTIAL - Abnormal; Notable for the following components:    WBC 2.7 (*)     Hemoglobin 13.2 (*)     MCH 25.3 (*)     MCHC 29.7 (*)     Neutrophils % 94.0 (*)     Lymphocytes % 4.8 (*)     Lymphocytes Absolute 0.1 (*)     All other components within normal limits   COMPREHENSIVE METABOLIC PANEL W/ REFLEX TO MG FOR LOW K - Abnormal; Notable for the following components:    CO2 21 (*)     BUN 36 (*)     CREATININE 3.1 (*)     GFR Non- 20 (*)     GFR  24 (*)     Albumin 2.9 (*)     All other components within normal limits   BLOOD GAS, ARTERIAL - Abnormal; excluding separately reportable procedures. Time includes direct patient care, reassessing patient, documenting care, and coordinating care for the patient. Time also includes data interpretation of any lab tests or imaging that were performed. There was a high probability of clinically significant/life threatening deterioration in the patient's condition which required my urgent intervention.       (Please note that portions of this note were completed with a voice recognition program.  Efforts were made to edit the dictations but occasionally words aremis-transcribed.)    Reina Reid MD (electronically signed)  Attending Emergency Physician           Reina Reid MD  02/22/22 [id number]

## 2022-02-22 NOTE — ED PROVIDER NOTES
[facility] ICU  eMERGENCYdEPARTMENT eNCOUnter      Pt Name: Oli Clark  MRN: [id number]  [place] 1950  Date of evaluation: 2/22/2022  Provider:JAYLEN Parrish    CHIEF COMPLAINT       Chief Complaint   Patient presents with    Shortness of Breath    Altered Mental Status    Urinary Tract Infection         HISTORY OF PRESENT ILLNESS  (Location/Symptom, Timing/Onset, Context/Setting, Quality, Duration, Modifying Factors, Severity.)   Oli Clark is a 73 y.o. male who presents to the emergency department with complaints of SOA persistent dysuria coming tachycardic on room air baseline he is at 90% a little bit on the hypotensive side septic criteria has been met he has a history of ESBL it is passed multiple kidney stones had lithotripsy in January Dr. John Galindo has an appointment next week with [facility] with infectious diseases. ABGs are pending along with a chest x-ray blood cultures have also been obtained anticipate IV antibiotics and admission his spouse tells me that he continually has UTIs intermittently since last March and usually requires hospitalization he was last hospitalized at [facility] 2 weeks ago where he was given IV antibiotics at that time. Since discharge has a continue to complain of urinary discomfort and confusion has been increasing as well. Npo currently last ate last night. Drank some water prior to ED arrival.    HPI    Nursing Notes were reviewed and I agree. REVIEW OF SYSTEMS    (2-9 systems for level 4, 10 or more for level 5)     Review of Systems   Constitutional: Positive for fatigue and fever. Negative for activity change, appetite change and chills. HENT: Negative for congestion and dental problem. Eyes: Negative for photophobia, discharge and itching. Respiratory: Positive for shortness of breath. Negative for apnea and cough. Cardiovascular: Negative for chest pain.    Musculoskeletal: Negative for arthralgias, back pain, gait problem, myalgias and neck pain.   Skin: Negative for color change, pallor and rash. Neurological: Negative for dizziness, seizures and syncope. Psychiatric/Behavioral: Negative for agitation. The patient is not nervous/anxious. Except as noted above the remainder of the review of systems was reviewed and negative. PAST MEDICAL HISTORY     Past Medical History:   Diagnosis Date    ACS (acute coronary syndrome) ([facility] [address] 4/27/2016 4/27/16  ACS  BRANDO RISK Score 1 (angina), AUC indication 3, AUC score 7 4/27/16  Cath  99% 1st diagonal, 2.25 x 15 YURIDIA, anterior lateral hypokinesis, EF 45%     CAD (coronary artery disease)     has seen dr. Lenoria Boeck in the past    Cerebral artery occlusion with cerebral infarction ([facility] [address] 1998    Difficulty voiding     per pt c/o.     Hyperlipidemia     Hypertension 4/27/2016    Kidney stone     MI (myocardial infarction) ([facility] [address]     stent    Renal calculus, right 8/1/2018    UTI (urinary tract infection)     due to stones; pt has been taking iv antibiotics at home; 1/5/22 last dose         SURGICAL HISTORY       Past Surgical History:   Procedure Laterality Date    CARDIAC CATHETERIZATION      COLONOSCOPY      CYSTOSCOPY Right 2/22/2022    CYSTOSCOPY ,URETEROSCOPY WITH URETERAL STENT INSERTION [facility] D[facility] [place] [facility] Right 8/28/2018    CYSTOSCOPY STENT REMOVAL performed by Dora Rios MD at [address] LITHOTRIPSY Left 1/6/2022    LEFT RENAL EXTRACORPOREAL SHOCK WAVE LITHOTRIPSY performed by Dora Rios MD at [address] CIRCUMCISION,OTHER,28+ D/O N/A 8/21/2018    DORSAL SLIT performed by Dora Rios MD at [address] &[place] STENT INSRT Right 8/21/2018    CYSTOSCOPY URETEROSCOPY LASER LITHOTRIPSY performed by Dora Rios MD at [facility] ESWL Right 8/28/2018    ESWL EXTRACORPEAL SHOCK WAVE LITHOTRIPSY performed by Dora Rios MD at [facility] OR         CURRENT MEDICATIONS       Current Discharge Medication List      CONTINUE these medications which have NOT CHANGED    Details   diphenhydrAMINE (BENADRYL) 25 MG tablet Take 25 mg by mouth nightly as needed for Sleep      Enoxaparin Sodium (LOVENOX SC) Inject 40 mg into the skin 2 times daily lovenox bridge; pt unsure of dose       dilTIAZem (CARDIZEM 12 HR) 120 MG extended release capsule Take 120 mg by mouth 2 times daily      lisinopril (PRINIVIL;ZESTRIL) 20 MG tablet Take 20 mg by mouth 2 times daily       tamsulosin (FLOMAX) 0.4 MG capsule Take 0.4 mg by mouth daily       diclofenac (VOLTAREN) 50 MG EC tablet Take 50 mg by mouth 2 times daily       CARTIA  MG extended release capsule TAKE 1 CAPSULE BY MOUTH EVERY DAY      warfarin (COUMADIN) 1 MG tablet Take 5 mg by mouth daily       clotrimazole-betamethasone (LOTRISONE) 1-0.05 % cream Apply topically 2 times daily. Qty: 1 Tube, Refills: 1      bacitracin-polymyxin b (POLYSPORIN) 500-35604 UNIT/GM ointment Apply to incision TID  Qty: 15 g, Refills: 1      betamethasone dipropionate (DIPROLENE) 0.05 % cream Apply topically 2 times daily.   Qty: 15 g, Refills: 0    Associated Diagnoses: Acquired phimosis      metoprolol succinate (TOPROL XL) 25 MG extended release tablet Take 1 tablet by mouth daily  Qty: 90 tablet, Refills: 3    Associated Diagnoses: Essential hypertension      Diphenhydramine-APAP, sleep, (TYLENOL PM EXTRA STRENGTH PO) Take 2 capsules by mouth nightly as needed              ALLERGIES     Codeine    FAMILY HISTORY       Family History   Problem Relation Age of Onset    Cancer Sister     Cancer Brother     Heart Disease Father           SOCIAL HISTORY       Social History     Socioeconomic History    Marital status:      Spouse name: jessica gunn    Number of children: 2    Years of education: None    Highest education level: None   Occupational History    Occupation: farmer    Tobacco Use    Smoking status: Former Smoker     Packs/day: 3.00     Years: 35.00     Pack years: 105.00    Smokeless tobacco: Former User     Quit date: 4/27/2001   Vaping Use    Vaping Use: Never used   Substance and Sexual Activity    Alcohol use: No    Drug use: No    Sexual activity: Yes     Partners: Female   Other Topics Concern    None   Social History Narrative    None     Social Determinants of Health     Financial Resource Strain:     Difficulty of Paying Living Expenses: Not on file   Food Insecurity:     Worried About Running Out of Food in the Last Year: Not on file    Lilian of Food in the Last Year: Not on file   Transportation Needs:     Lack of Transportation (Medical): Not on file    Lack of Transportation (Non-Medical):  Not on file   Physical Activity:     Days of Exercise per Week: Not on file    Minutes of Exercise per Session: Not on file   Stress:     Feeling of Stress : Not on file   Social Connections:     Frequency of Communication with Friends and Family: Not on file    Frequency of Social Gatherings with Friends and Family: Not on file    Attends Zoroastrianism Services: Not on file    Active Member of [address] [place] [address] or Organizations: Not on file    Attends Club or Organization Meetings: Not on file    Marital Status: Not on file   Intimate Partner Violence:     Fear of Current or Ex-Partner: Not on file    Emotionally Abused: Not on file    Physically Abused: Not on file    Sexually Abused: Not on file   Housing Stability:     Unable to Pay for Housing in the Last Year: Not on file    Number of [place] in the Last Year: Not on file    Unstable Housing in the Last Year: Not on file       SCREENINGS    Herlinda Coma Scale  Eye Opening: Spontaneous  Best Verbal Response: Oriented  Best Motor Response: Obeys commands  Herlinda Coma Scale Score: 15      PHYSICAL EXAM    (up to 7 forlevel 4, 8 or more for level 5)     ED Triage Vitals [02/22/22 1246]   BP Temp Temp src Pulse Resp SpO2 Height Weight   99/70 98.3 °F (36.8 °C) -- 125 22 93 % 6' 3\" (1.905 m) 274 lb (124.3 kg)       Physical Exam  Constitutional:       General: He is not in acute distress. Appearance: He is well-developed. He is ill-appearing. He is not diaphoretic. HENT:      Head: Normocephalic and atraumatic. Right Ear: External ear normal.      Left Ear: External ear normal.   Eyes:      Pupils: Pupils are equal, round, and reactive to light. Neck:      Trachea: No tracheal deviation. Cardiovascular:      Rate and Rhythm: Normal rate and regular rhythm. Heart sounds: Normal heart sounds. No murmur heard. Pulmonary:      Effort: Pulmonary effort is normal.      Breath sounds: Normal breath sounds. No stridor. No wheezing. Chest:      Chest wall: No tenderness. Abdominal:      General: Bowel sounds are normal. There is no distension. Palpations: Abdomen is soft. Tenderness: There is no abdominal tenderness. Musculoskeletal:         General: Normal range of motion. Cervical back: Normal range of motion and neck supple. Skin:     General: Skin is warm and dry. Capillary Refill: Capillary refill takes less than 2 seconds. Neurological:      General: No focal deficit present. Mental Status: He is alert and oriented to person, place, and time. Psychiatric:         Mood and Affect: Mood normal.         Behavior: Behavior normal.           DIAGNOSTIC RESULTS     RADIOLOGY:   Non-plain film images such as CT, Ultrasound and MRI are read by the radiologist. Plain radiographic images are visualized and preliminarilyinterpreted by Pedro Mobley MD with the below findings:        Interpretation per the Radiologist below, if available at the time of this note:    CT GUIDED NEEDLE PLACEMENT   Final Result   CT guidance was utilized during placement of an 8 [place] citizen   pigtail catheter into a fluid collection arising from or adjacent to   the lower pole right kidney.  The procedure was performed without   difficulty or immediate complication, as above. Signed by Dr Kevin Biswas Caro   Final Result      NM LUNG [address]   Final Result   1. Very low probability of pulmonary emboli. Signed by Dr Becky Barriga   Final Result   1. There is a 5 mm stone within the right proximal to mid ureter   positioned at the L4 level creating mild to moderate right-sided   hydronephrosis. 2. There is an increasing size cystic lesion/collection within the   inferior pole of the right kidney at the site of a prior 2.6 cm renal   cyst now measuring 4.5 cm containing few air bubbles with adjacent   perinephric and right psoas stranding concerning for an infected right   renal cyst.   3. Hepatic steatosis. 4. Atherosclerotic changes of the aorta with ectasia to measurement of   3.2 cm   Signed by Dr Danielle Mcgee      XR CHEST PORTABLE   Final Result   1. No acute disease.    Signed by Dr Wright Due:  Labs Reviewed   CULTURE, BLOOD 2 - Abnormal; Notable for the following components:       Result Value    Culture, Blood 2   (*)     Value:  Bottle volume = 8 ml  Gram stain Anaerobic bottle:  Gram negative rods  Culture in progress  Please notify Physician      All other components within normal limits    Narrative:     ORDER#: [id number]                          ORDERED BY: SOCRATES ALANIS  SOURCE: Blood lfa                          COLLECTED:  02/22/22 13:27  ANTIBIOTICS AT NAA.:                      RECEIVED :  02/22/22 29:43  CALL  [facility]  [facility] tel. ,  Microbiology results called to and read back by Sarah Gardner RN , ICU,  02/23/2022 09:09, by [facility]   CULTURE, BLOOD 1 - Abnormal; Notable for the following components:    Blood Culture, Routine   (*)     Value: **THIS IS A CORRECTED REPORT**   Bottle volume = 9 ml  Gram stain Anaerobic bottle:  Gram negative rods  Culture in progress  Please notify Physician  **No Gram Positive Rods as previously reported**   Bottle volume = 8 ml  Gram stain Aerobic bottle:  Gram negative rods  Culture in progress  Please notify Physician  **No Gram Positive Rods as previously reported**      All other components within normal limits    Narrative:     ORDER#: [id number]                          ORDERED BY: SOCRATES ALANIS  SOURCE: Blood rac                          COLLECTED:  02/22/22 12:56  ANTIBIOTICS AT NAA.:                      RECEIVED :  02/22/22 91:31  CALL  [place]  [facility] tel. ,  Microbiology results called to and read back by Judy Powers RN, ICU,  02/23/2022 09:22, by [facility]  Microbiology results called to and read back by Wendy Mauricio RN, ICU,  02/23/2022 02:44, by [facility]   CULTURE, URINE - Abnormal; Notable for the following components:    Urine Culture, Routine >100,000 CFU/ml (*)     Organism Escherichia coli (*)     All other components within normal limits    Narrative:     ORDER#: [id number]                          ORDERED BY: SOCRATES ALANIS  SOURCE: Urine Clean Catch                  COLLECTED:  02/22/22 15:38  ANTIBIOTICS AT NAA.:                      RECEIVED :  02/22/22 15:46   LACTATE, SEPSIS - Abnormal; Notable for the following components:    Lactic Acid, Sepsis 2.7 (*)     All other components within normal limits    Narrative:     [facility] tel. ,  Chemistry results called to and read back by Pushpa Parsons RN/ER, 02/22/2022 13:26,  by [address] [place] [address] Way, SEPSIS - Abnormal; Notable for the following components:    Lactic Acid, Sepsis 2.7 (*)     All other components within normal limits    Narrative:     CALL  [facility]es  [facility] tel. ,  Chemistry results called to and read back by [facility] JAMEY RAMIREZ RN/ER, 02/22/2022 17:04, by  [facility]   CBC WITH AUTO DIFFERENTIAL - Abnormal; Notable for the following components:    WBC 2.7 (*)     Hemoglobin 13.2 (*)     MCH 25.3 (*)     MCHC 29.7 (*)     Neutrophils % 94.0 (*)     Lymphocytes % 4.8 (*)     Lymphocytes Absolute 0.1 (*)     All other components within normal limits   COMPREHENSIVE METABOLIC PANEL W/ REFLEX TO MG FOR LOW K - Abnormal; Notable for the following components:    CO2 21 (*)     BUN 36 (*)     CREATININE 3.1 (*)     GFR Non- 20 (*)     GFR  24 (*)     Albumin 2.9 (*)     All other components within normal limits   BLOOD GAS, ARTERIAL - Abnormal; Notable for the following components:    HCO3, Arterial 20.7 (*)     Base Excess, Arterial -3.8 (*)     Hemoglobin, Art, Extended 13.3 (*)     All other components within normal limits   PROTIME-INR - Abnormal; Notable for the following components:    Protime 17.3 (*)     INR 1.41 (*)     All other components within normal limits   URINALYSIS WITH REFLEX TO CULTURE - Abnormal; Notable for the following components:    Clarity, UA TURBID (*)     Blood, Urine MODERATE (*)     Protein,  (*)     Leukocyte Esterase, Urine LARGE (*)     All other components within normal limits   D-DIMER, QUANTITATIVE - Abnormal; Notable for the following components:    D-Dimer, Quant 3.87 (*)     All other components within normal limits   MICROSCOPIC URINALYSIS - Abnormal; Notable for the following components:    WBC, UA TNTC (*)     RBC, UA 11-15 (*)     Bacteria, UA 3+ (*)     All other components within normal limits   LACTATE, SEPSIS - Abnormal; Notable for the following components:    Lactic Acid, Sepsis 2.6 (*)     All other components within normal limits    Narrative:     CALL  [facility] tel. ,  Chemistry results called to and read back by [facility] RN/ICU, 02/22/2022 18:55,  by [id number] [place] [address], SEPSIS - Abnormal; Notable for the following components:    Lactic Acid, Sepsis 2.2 (*)     All other components within normal limits    Narrative:     [facility] tel. ,  Chemistry results called to and read back by angy rn/icu, 02/22/2022  22:45, by [facility]  Collection has been rescheduled by Tena Ruffin at 02/22/2022 20:44 Reason: Pt   in or   BASIC METABOLIC PANEL - Abnormal; Notable for the following components:    Potassium 6.3 (*)     CO2 20 (*)     Glucose 122 (*)     BUN 37 (*)     CREATININE 3.7 (*)     GFR Non- 16 (*)     GFR African American 20 (*)     Calcium 8.0 (*)     All other components within normal limits    Narrative:     CALL  [place]  [facility] tel. ,  Chemistry results called to and read back by Max Mahan RN/ICU, 02/23/2022  02:23, by [facility]   CBC WITH AUTO DIFFERENTIAL - Abnormal; Notable for the following components:    WBC 22.5 (*)     RBC 4.33 (*)     Hemoglobin 11.2 (*)     Hematocrit 38.7 (*)     MCH 25.9 (*)     MCHC 28.9 (*)     Neutrophils % 84.0 (*)     Lymphocytes % 2.0 (*)     Neutrophils Absolute 21.8 (*)     Lymphocytes Absolute 0.7 (*)     Bands Relative 13 (*)     Vacuolated Neutrophils 2+ (*)     Hypochromia 1+ (*)     All other components within normal limits   APTT - Abnormal; Notable for the following components:    aPTT 36.5 (*)     All other components within normal limits   PROTIME-INR - Abnormal; Notable for the following components:    Protime 17.2 (*)     INR 1.40 (*)     All other components within normal limits   BLOOD ID PANEL, MOLECULAR - Abnormal; Notable for the following components:    Enterobacteriaceae by PCR DETECTED (*)     Escherichia coli by PCR DETECTED (*)     Cephalosporin Resistance CTX-M gene by PCR DETECTED (*)     All other components within normal limits    Narrative:     [facility] tel. ,  Microbiology results called to and read back by Ankit Nicolas RN , ICU,  02/23/2022 09:09, by [facility] tel. ,  Microbiology results called to and read back by Ankit Nicolas RN, ICU,  02/23/2022 09:18, by [facility]  Microbiology results called to and read back by Ankit Nicolas RN ICU,  02/23/2022 09:13, by Juan M - Abnormal; Notable for the following components:    Protime 18.2 (*)     INR 1.51 (*)     All other components within normal limits   COVID-19, RAPID   CULTURE, URINE    Narrative:     ORDER#: [id number] ORDERED BY: DEMETRIS CASILLAS  SOURCE: Urine Nephrostomy 1 right kidney urCOLLECTED:  02/22/22 19:32  ANTIBIOTICS AT NAA.:                      RECEIVED :  02/22/22 21:12   CULTURE, URINE    Narrative:     ORDER#: [id number]                          ORDERED BY: Karon Lu  SOURCE: Urine Clean Catch  2 bladder urine COLLECTED:  02/22/22 19:33  ANTIBIOTICS AT NAA.:                      RECEIVED :  02/22/22 21:13   CULTURE, ANAEROBIC AND AEROBIC    Narrative:     ORDER#: [id number]                          ORDERED BY: Karon Lu  SOURCE: Cyst right renal cyst fluid        COLLECTED:  02/23/22 13:45  ANTIBIOTICS AT NAA.:                      RECEIVED :  02/23/22 14:19   CULTURE, BLOOD 1   CULTURE, BLOOD 2   APTT   TROPONIN   BRAIN NATRIURETIC PEPTIDE   POTASSIUM, WHOLE BLOOD   POTASSIUM   TYPE AND SCREEN    Narrative:     Collection has been rescheduled by [facility] at 02/23/2022 08:15 Reason:   Checking with [address]       All other labs were within normal range or notreturned as of this dictation. RE-ASSESSMENT        EMERGENCY DEPARTMENT COURSE and DIFFERENTIAL DIAGNOSIS/MDM:   Vitals:    Vitals:    02/23/22 1100 02/23/22 1105 02/23/22 1200 02/23/22 1423   BP: 116/73  (!) 136/108    Pulse: 79 77 73    Resp: 21 26 28 23   Temp:  97.2 °F (36.2 °C) 98 °F (36.7 °C)    TempSrc:  Temporal Temporal    SpO2: 97% 96% 97% 94%   Weight:       Height:         EKG shows sinus tachycardia no signs of ischemia interpreted by my attending. Plan to re-evaluate after fluids. MDM  Plan will be for antibiotics admission Dr. John Galindo with urology will take this gentleman to the OR this evening for stenting. This is based on obvious infection with kidney stone. He appears septic with resolving hypotension. I spoke with Dr. Virginia Moraes the intensivist who will take over care of this patient.   My attending Dr. Star Tejeda has evaluated this patient separate from myself and agreeable to plan of care. PROCEDURES:    Procedures      FINAL IMPRESSION      1. Septicemia ([address]    2. Hypoxia    3. Kidney stone    4. Acute pyelonephritis    5. Infected kidney cyst          DISPOSITION/PLAN   DISPOSITION Admitted 02/22/2022 04:51:11 PM      PATIENT REFERRED TO:  No follow-up provider specified.     DISCHARGE MEDICATIONS:  Current Discharge Medication List          (Please note that portions of this note were completed with a voice recognition program.  Efforts were made to edit the dictations but occasionallywords are mis-transcribed.)    [place], [place]  02/23/22 0567

## 2022-02-22 NOTE — ED NOTES
Spoke to Dr. Michael Benitez about pressure support.  Pt finishing up 3000mls NS and pressure is 70/40  Verbal order for Sunitha Garcia RN  02/22/22 [address], RN  02/22/22 1206

## 2022-02-22 NOTE — PROGRESS NOTES
Results for Anuja Cabrera (MRN [id number]) as of 2/22/2022 13:41   Ref. Range 2/22/2022 13:40   Hemoglobin, Art, Extended Latest Ref Range: 14.0 - 18.0 g/dL 13.3 (L)   pH, Arterial Latest Ref Range: 7.350 - 7.450  7.380   pCO2, Arterial Latest Ref Range: 35.0 - 45.0 mmHg 35.0   pO2, Arterial Latest Ref Range: 80.0 - 100.0 mmHg 94.0   HCO3, Arterial Latest Ref Range: 22.0 - 26.0 mmol/L 20.7 (L)   Base Excess, Arterial Latest Ref Range: -2.0 - 2.0 mmol/L -3.8 (L)   O2 Sat, Arterial Latest Ref Range: >92 % 95.2   O2 Content, Arterial Latest Ref Range: Not Established mL/dL 17.9   Methemoglobin, Arterial Latest Ref Range: <1.5 % 1.2   Carboxyhgb, Arterial Latest Ref Range: 0.0 - 5.0 % 1.8     Patient on 5 l/m nasal cannula. ABG drawn from RR, AT+.

## 2022-02-23 ENCOUNTER — APPOINTMENT (OUTPATIENT)
Dept: CT IMAGING | Age: 72
DRG: 853 | End: 2022-02-23
Payer: MEDICARE

## 2022-02-23 LAB
ABO/RH: NORMAL
ACINETOBACTER CALCOAC BAUMANNII COMPLEX BY PCR: NOT DETECTED
ANION GAP SERPL CALCULATED.3IONS-SCNC: 11 MMOL/L (ref 7–19)
ANTIBODY SCREEN: NORMAL
APTT: 36.5 SEC (ref 26–36.2)
ATYPICAL LYMPHOCYTE RELATIVE PERCENT: 1 % (ref 0–8)
BACTEROIDES FRAGILIS BY PCR: NOT DETECTED
BANDED NEUTROPHILS RELATIVE PERCENT: 13 % (ref 0–5)
BASOPHILS ABSOLUTE: 0 K/UL (ref 0–0.2)
BASOPHILS RELATIVE PERCENT: 0 % (ref 0–1)
BLOOD BANK DISPENSE STATUS: NORMAL
BLOOD BANK DISPENSE STATUS: NORMAL
BLOOD BANK PRODUCT CODE: NORMAL
BLOOD BANK PRODUCT CODE: NORMAL
BPU ID: NORMAL
BPU ID: NORMAL
BUN BLDV-MCNC: 37 MG/DL (ref 8–23)
CALCIUM SERPL-MCNC: 8 MG/DL (ref 8.8–10.2)
CANDIDA ALBICANS BY PCR: NOT DETECTED
CANDIDA AURIS BY PCR: NOT DETECTED
CANDIDA GLABRATA BY PCR: NOT DETECTED
CANDIDA KRUSEI BY PCR: NOT DETECTED
CANDIDA PARAPSILOSIS BY PCR: NOT DETECTED
CANDIDA TROPICALIS BY PCR: NOT DETECTED
CARBAPENEM RESISTANCE IMP GENE BY PCR: NOT DETECTED
CARBAPENEM RESISTANCE KPC BY PCR: NOT DETECTED
CARBAPENEM RESISTANCE NDM GENE BY PCR: NOT DETECTED
CARBAPENEM RESISTANCE OXA-48 GENE BY PCR: NOT DETECTED
CARBAPENEM RESISTANCE VIM GENE BY PCR: NOT DETECTED
CEPHALOSPORIN RESISTANCE CTX-M GENE BY PCR: DETECTED
CHLORIDE BLD-SCNC: 106 MMOL/L (ref 98–111)
CO2: 20 MMOL/L (ref 22–29)
COLISTIN RESISTANCE MCR-1 GENE BY PCR: NOT DETECTED
CREAT SERPL-MCNC: 3.7 MG/DL (ref 0.5–1.2)
CRYPTOCOCCUS NEOFORMANS/GATTII BY PCR: NOT DETECTED
DESCRIPTION BLOOD BANK: NORMAL
DESCRIPTION BLOOD BANK: NORMAL
ENTEROBACTER CLOACAE COMPLEX BY PCR: NOT DETECTED
ENTEROBACTERALES BY PCR: DETECTED
ENTEROCOCCUS FAECALIS BY PCR: NOT DETECTED
ENTEROCOCCUS FAECIUM BY PCR: NOT DETECTED
EOSINOPHILS ABSOLUTE: 0 K/UL (ref 0–0.6)
EOSINOPHILS RELATIVE PERCENT: 0 % (ref 0–5)
ESCHERICHIA COLI BY PCR: DETECTED
GFR AFRICAN AMERICAN: 20
GFR NON-AFRICAN AMERICAN: 16
GLUCOSE BLD-MCNC: 106 MG/DL (ref 70–99)
GLUCOSE BLD-MCNC: 122 MG/DL (ref 74–109)
GLUCOSE BLD-MCNC: 144 MG/DL (ref 70–99)
HAEMOPHILUS INFLUENZAE BY PCR: NOT DETECTED
HCT VFR BLD CALC: 38.7 % (ref 42–52)
HEMOGLOBIN: 11.2 G/DL (ref 14–18)
HYPOCHROMIA: ABNORMAL
IMMATURE GRANULOCYTES #: 1.2 K/UL
INR BLD: 1.4 (ref 0.88–1.18)
INR BLD: 1.51 (ref 0.88–1.18)
KLEBSIELLA AEROGENES BY PCR: NOT DETECTED
KLEBSIELLA OXYTOCA BY PCR: NOT DETECTED
KLEBSIELLA PNEUMONIAE GROUP BY PCR: NOT DETECTED
LISTERIA MONOCYTOGENES BY PCR: NOT DETECTED
LYMPHOCYTES ABSOLUTE: 0.7 K/UL (ref 1.1–4.5)
LYMPHOCYTES RELATIVE PERCENT: 2 % (ref 20–40)
MCH RBC QN AUTO: 25.9 PG (ref 27–31)
MCHC RBC AUTO-ENTMCNC: 28.9 G/DL (ref 33–37)
MCV RBC AUTO: 89.4 FL (ref 80–94)
MONOCYTES ABSOLUTE: 0 K/UL (ref 0–0.9)
MONOCYTES RELATIVE PERCENT: 0 % (ref 0–10)
NEISSERIA MENINGITIDIS BY PCR: NOT DETECTED
NEUTROPHILS ABSOLUTE: 21.8 K/UL (ref 1.5–7.5)
NEUTROPHILS RELATIVE PERCENT: 84 % (ref 50–65)
PDW BLD-RTO: 13.9 % (ref 11.5–14.5)
PERFORMED ON: ABNORMAL
PERFORMED ON: ABNORMAL
PLATELET # BLD: 174 K/UL (ref 130–400)
PLATELET SLIDE REVIEW: ADEQUATE
PMV BLD AUTO: 11.4 FL (ref 9.4–12.4)
POTASSIUM SERPL-SCNC: 6.3 MMOL/L (ref 3.5–5)
POTASSIUM SERPL-SCNC: 6.3 MMOL/L (ref 3.5–5)
PROTEUS SPECIES BY PCR: NOT DETECTED
PROTHROMBIN TIME: 17.2 SEC (ref 12–14.6)
PROTHROMBIN TIME: 18.2 SEC (ref 12–14.6)
PSEUDOMONAS AERUGINOSA BY PCR: NOT DETECTED
RBC # BLD: 4.33 M/UL (ref 4.7–6.1)
SALMONELLA SPECIES BY PCR: NOT DETECTED
SERRATIA MARCESCENS BY PCR: NOT DETECTED
SODIUM BLD-SCNC: 137 MMOL/L (ref 136–145)
STAPHYLOCOCCUS AUREUS BY PCR: NOT DETECTED
STAPHYLOCOCCUS EPIDERMIDIS BY PCR: NOT DETECTED
STAPHYLOCOCCUS LUGDUNENSIS BY PCR: NOT DETECTED
STAPHYLOCOCCUS SPECIES BY PCR: NOT DETECTED
STENOTROPHOMONAS MALTOPHILIA BY PCR: NOT DETECTED
STREPTOCOCCUS AGALACTIAE BY PCR: NOT DETECTED
STREPTOCOCCUS PNEUMONIAE BY PCR: NOT DETECTED
STREPTOCOCCUS PYOGENES  BY PCR: NOT DETECTED
STREPTOCOCCUS SPECIES BY PCR: NOT DETECTED
VACUOLATED NEUTROPHILS: ABNORMAL
WBC # BLD: 22.5 K/UL (ref 4.8–10.8)

## 2022-02-23 PROCEDURE — 87205 SMEAR GRAM STAIN: CPT

## 2022-02-23 PROCEDURE — 2580000003 HC RX 258

## 2022-02-23 PROCEDURE — 6370000000 HC RX 637 (ALT 250 FOR IP)

## 2022-02-23 PROCEDURE — 2500000003 HC RX 250 WO HCPCS

## 2022-02-23 PROCEDURE — 85610 PROTHROMBIN TIME: CPT

## 2022-02-23 PROCEDURE — 36415 COLL VENOUS BLD VENIPUNCTURE: CPT

## 2022-02-23 PROCEDURE — 36430 TRANSFUSION BLD/BLD COMPNT: CPT

## 2022-02-23 PROCEDURE — 84132 ASSAY OF SERUM POTASSIUM: CPT

## 2022-02-23 PROCEDURE — 86900 BLOOD TYPING SEROLOGIC ABO: CPT

## 2022-02-23 PROCEDURE — 2000000000 HC ICU R&B

## 2022-02-23 PROCEDURE — P9059 PLASMA, FRZ BETWEEN 8-24HOUR: HCPCS

## 2022-02-23 PROCEDURE — 82947 ASSAY GLUCOSE BLOOD QUANT: CPT

## 2022-02-23 PROCEDURE — 80048 BASIC METABOLIC PNL TOTAL CA: CPT

## 2022-02-23 PROCEDURE — 6370000000 HC RX 637 (ALT 250 FOR IP): Performed by: INTERNAL MEDICINE

## 2022-02-23 PROCEDURE — 86901 BLOOD TYPING SEROLOGIC RH(D): CPT

## 2022-02-23 PROCEDURE — 85730 THROMBOPLASTIN TIME PARTIAL: CPT

## 2022-02-23 PROCEDURE — 87070 CULTURE OTHR SPECIMN AEROBIC: CPT

## 2022-02-23 PROCEDURE — 6360000002 HC RX W HCPCS

## 2022-02-23 PROCEDURE — C1729 CATH, DRAINAGE: HCPCS

## 2022-02-23 PROCEDURE — 6360000002 HC RX W HCPCS: Performed by: INTERNAL MEDICINE

## 2022-02-23 PROCEDURE — 6360000002 HC RX W HCPCS: Performed by: UROLOGY

## 2022-02-23 PROCEDURE — 87075 CULTR BACTERIA EXCEPT BLOOD: CPT

## 2022-02-23 PROCEDURE — 86850 RBC ANTIBODY SCREEN: CPT

## 2022-02-23 PROCEDURE — 2700000000 HC OXYGEN THERAPY PER DAY

## 2022-02-23 PROCEDURE — 99024 POSTOP FOLLOW-UP VISIT: CPT | Performed by: UROLOGY

## 2022-02-23 PROCEDURE — 2580000003 HC RX 258: Performed by: UROLOGY

## 2022-02-23 PROCEDURE — 85025 COMPLETE CBC W/AUTO DIFF WBC: CPT

## 2022-02-23 RX ORDER — HYDROMORPHONE HYDROCHLORIDE 1 MG/ML
1 INJECTION, SOLUTION INTRAMUSCULAR; INTRAVENOUS; SUBCUTANEOUS
Status: COMPLETED | OUTPATIENT
Start: 2022-02-23 | End: 2022-02-24

## 2022-02-23 RX ORDER — HYDROMORPHONE HYDROCHLORIDE 1 MG/ML
0.5 INJECTION, SOLUTION INTRAMUSCULAR; INTRAVENOUS; SUBCUTANEOUS
Status: DISCONTINUED | OUTPATIENT
Start: 2022-02-23 | End: 2022-02-28 | Stop reason: HOSPADM

## 2022-02-23 RX ORDER — PHYTONADIONE 10 MG/ML
10 INJECTION, EMULSION INTRAMUSCULAR; INTRAVENOUS; SUBCUTANEOUS ONCE
Status: COMPLETED | OUTPATIENT
Start: 2022-02-23 | End: 2022-02-23

## 2022-02-23 RX ORDER — MIDAZOLAM HYDROCHLORIDE 1 MG/ML
INJECTION INTRAMUSCULAR; INTRAVENOUS
Status: COMPLETED
Start: 2022-02-23 | End: 2022-02-23

## 2022-02-23 RX ORDER — SODIUM CHLORIDE 9 MG/ML
INJECTION, SOLUTION INTRAVENOUS PRN
Status: DISCONTINUED | OUTPATIENT
Start: 2022-02-23 | End: 2022-02-28 | Stop reason: HOSPADM

## 2022-02-23 RX ORDER — DEXTROSE MONOHYDRATE 50 MG/ML
100 INJECTION, SOLUTION INTRAVENOUS PRN
Status: DISCONTINUED | OUTPATIENT
Start: 2022-02-23 | End: 2022-02-28 | Stop reason: HOSPADM

## 2022-02-23 RX ORDER — DEXTROSE MONOHYDRATE 25 G/50ML
25 INJECTION, SOLUTION INTRAVENOUS PRN
Status: DISCONTINUED | OUTPATIENT
Start: 2022-02-23 | End: 2022-02-23 | Stop reason: CLARIF

## 2022-02-23 RX ORDER — FENTANYL CITRATE 50 UG/ML
INJECTION, SOLUTION INTRAMUSCULAR; INTRAVENOUS
Status: COMPLETED
Start: 2022-02-23 | End: 2022-02-23

## 2022-02-23 RX ORDER — CALCIUM GLUCONATE 20 MG/ML
1000 INJECTION, SOLUTION INTRAVENOUS ONCE
Status: COMPLETED | OUTPATIENT
Start: 2022-02-23 | End: 2022-02-24

## 2022-02-23 RX ORDER — DEXTROSE MONOHYDRATE 25 G/50ML
12.5 INJECTION, SOLUTION INTRAVENOUS PRN
Status: DISCONTINUED | OUTPATIENT
Start: 2022-02-23 | End: 2022-02-23 | Stop reason: CLARIF

## 2022-02-23 RX ORDER — NICOTINE POLACRILEX 4 MG
15 LOZENGE BUCCAL PRN
Status: DISCONTINUED | OUTPATIENT
Start: 2022-02-23 | End: 2022-02-23 | Stop reason: CLARIF

## 2022-02-23 RX ADMIN — DEXTROSE MONOHYDRATE 250 ML: 100 INJECTION, SOLUTION INTRAVENOUS at 22:48

## 2022-02-23 RX ADMIN — CALCIUM GLUCONATE 1000 MG: 20 INJECTION, SOLUTION INTRAVENOUS at 22:40

## 2022-02-23 RX ADMIN — HYDROMORPHONE HYDROCHLORIDE 0.5 MG: 1 INJECTION, SOLUTION INTRAMUSCULAR; INTRAVENOUS; SUBCUTANEOUS at 19:48

## 2022-02-23 RX ADMIN — HYDROMORPHONE HYDROCHLORIDE 0.5 MG: 1 INJECTION, SOLUTION INTRAMUSCULAR; INTRAVENOUS; SUBCUTANEOUS at 09:34

## 2022-02-23 RX ADMIN — PHYTONADIONE 10 MG: 10 INJECTION, EMULSION INTRAMUSCULAR; INTRAVENOUS; SUBCUTANEOUS at 09:07

## 2022-02-23 RX ADMIN — MEROPENEM AND SODIUM CHLORIDE 1000 MG: 1 INJECTION, SOLUTION INTRAVENOUS at 18:16

## 2022-02-23 RX ADMIN — INSULIN HUMAN 10 UNITS: 100 INJECTION, SOLUTION PARENTERAL at 22:37

## 2022-02-23 RX ADMIN — SODIUM ZIRCONIUM CYCLOSILICATE 10 G: 10 POWDER, FOR SUSPENSION ORAL at 03:28

## 2022-02-23 RX ADMIN — HYDROMORPHONE HYDROCHLORIDE 1 MG: 1 INJECTION, SOLUTION INTRAMUSCULAR; INTRAVENOUS; SUBCUTANEOUS at 00:15

## 2022-02-23 RX ADMIN — MIDAZOLAM 2 MG: 1 INJECTION INTRAMUSCULAR; INTRAVENOUS at 14:18

## 2022-02-23 RX ADMIN — ENOXAPARIN SODIUM 40 MG: 100 INJECTION SUBCUTANEOUS at 18:15

## 2022-02-23 RX ADMIN — SODIUM ZIRCONIUM CYCLOSILICATE 10 G: 10 POWDER, FOR SUSPENSION ORAL at 22:32

## 2022-02-23 RX ADMIN — HYDROMORPHONE HYDROCHLORIDE 1 MG: 1 INJECTION, SOLUTION INTRAMUSCULAR; INTRAVENOUS; SUBCUTANEOUS at 04:54

## 2022-02-23 RX ADMIN — HYDROMORPHONE HYDROCHLORIDE 0.5 MG: 1 INJECTION, SOLUTION INTRAMUSCULAR; INTRAVENOUS; SUBCUTANEOUS at 16:54

## 2022-02-23 RX ADMIN — SODIUM CHLORIDE, PRESERVATIVE FREE 10 ML: 5 INJECTION INTRAVENOUS at 09:10

## 2022-02-23 RX ADMIN — FENTANYL CITRATE 50 MCG: 50 INJECTION INTRAMUSCULAR; INTRAVENOUS at 14:17

## 2022-02-23 ASSESSMENT — PAIN DESCRIPTION - LOCATION: LOCATION: FLANK

## 2022-02-23 ASSESSMENT — PAIN DESCRIPTION - PROGRESSION: CLINICAL_PROGRESSION: GRADUALLY IMPROVING

## 2022-02-23 ASSESSMENT — PAIN SCALES - GENERAL
PAINLEVEL_OUTOF10: 5
PAINLEVEL_OUTOF10: 2
PAINLEVEL_OUTOF10: 2
PAINLEVEL_OUTOF10: 6
PAINLEVEL_OUTOF10: 2
PAINLEVEL_OUTOF10: 0
PAINLEVEL_OUTOF10: 0
PAINLEVEL_OUTOF10: 4
PAINLEVEL_OUTOF10: 7

## 2022-02-23 ASSESSMENT — ENCOUNTER SYMPTOMS
NAUSEA: 0
DIARRHEA: 0
SHORTNESS OF BREATH: 1
VOMITING: 0
EYES NEGATIVE: 1

## 2022-02-23 ASSESSMENT — PAIN DESCRIPTION - PAIN TYPE: TYPE: ACUTE PAIN

## 2022-02-23 ASSESSMENT — PAIN DESCRIPTION - ORIENTATION: ORIENTATION: RIGHT

## 2022-02-23 ASSESSMENT — PAIN DESCRIPTION - FREQUENCY: FREQUENCY: INTERMITTENT

## 2022-02-23 ASSESSMENT — PAIN DESCRIPTION - ONSET: ONSET: ON-GOING

## 2022-02-23 NOTE — PROGRESS NOTES
Hospitalist Progress Note  [facility]     Patient: Moy Calhoun  : 1950  MRN: [id number]  Code Status: Full Code    Hospital Day: 1   Date of Service: 2022    Subjective:   Patient seen and examined. No current complaints. Past Medical History:   Diagnosis Date    ACS (acute coronary syndrome) ([facility] [address] 2016  ACS  BRANDO RISK Score 1 (angina), AUC indication 3, AUC score 7 16  Cath  99% 1st diagonal, 2.25 x 15 YURIDIA, anterior lateral hypokinesis, EF 45%     CAD (coronary artery disease)     has seen dr. Phylicia Au in the past    Cerebral artery occlusion with cerebral infarction ([facility] [address]     Difficulty voiding     per pt c/o.     Hyperlipidemia     Hypertension 2016    Kidney stone     MI (myocardial infarction) ([facility] [address]     stent    Renal calculus, right 2018    UTI (urinary tract infection)     due to stones; pt has been taking iv antibiotics at home; 22 last dose       Past Surgical History:   Procedure Laterality Date    CARDIAC CATHETERIZATION      COLONOSCOPY      CYSTOSCOPY Right 2022    CYSTOSCOPY ,URETEROSCOPY WITH URETERAL STENT INSERTION performed by Karri Rondon MD at [address] / Eunice Rodríguez Right 2018    CYSTOSCOPY STENT REMOVAL performed by Karri Rondon MD at [address] LITHOTRIPSY Left 2022    LEFT RENAL EXTRACORPOREAL SHOCK WAVE LITHOTRIPSY performed by Karri Rondon MD at [facility] CIRCUMCISION,OTHER,28+ D/O N/A 2018    DORSAL SLIT performed by Karri Rondon MD at [address] &[place]WE[place] STENT INSRT Right 2018    CYSTOSCOPY URETEROSCOPY LASER LITHOTRIPSY performed by Karri Rondon MD at [facility] ESWL Right 2018    ESWL [address] LITHOTRIPSY performed by Karri Rondon MD at [facility] OR       Family History   Problem Relation Age of Onset    Cancer Sister     Cancer Brother  Heart Disease Father        Social History     Socioeconomic History    Marital status:      Spouse name: jessica gunn    Number of children: 2    Years of education: Not on file    Highest education level: Not on file   Occupational History    Occupation: farmer    Tobacco Use    Smoking status: Former Smoker     Packs/day: 3.00     Years: 35.00     Pack years: 105.00    Smokeless tobacco: Former User     Quit date: 4/27/2001   Vaping Use    Vaping Use: Never used   Substance and Sexual Activity    Alcohol use: No    Drug use: No    Sexual activity: Yes     Partners: Female   Other Topics Concern    Not on file   Social History Narrative    Not on file     Social Determinants of Health     Financial Resource Strain:     Difficulty of Paying Living Expenses: Not on file   Food Insecurity:     Worried About [address] in the Last Year: Not on file    Lilian of Food in the Last Year: Not on file   Transportation Needs:     Lack of Transportation (Medical): Not on file    Lack of Transportation (Non-Medical):  Not on file   Physical Activity:     Days of Exercise per Week: Not on file    Minutes of Exercise per Session: Not on file   Stress:     Feeling of Stress : Not on file   Social Connections:     Frequency of Communication with Friends and Family: Not on file    Frequency of Social Gatherings with Friends and Family: Not on file    Attends Zoroastrian Services: Not on file    Active Member of Clubs or Organizations: Not on file    Attends Club or Organization Meetings: Not on file    Marital Status: Not on file   Intimate Partner Violence:     Fear of Current or Ex-Partner: Not on file    Emotionally Abused: Not on file    Physically Abused: Not on file    Sexually Abused: Not on file   Housing Stability:     Unable to Pay for Housing in the Last Year: Not on file    Number of [place] in the Last Year: Not on file    Unstable Housing in the Last Year: Not on file       Current Facility-Administered Medications   Medication Dose Route Frequency Provider Last Rate Last Admin    HYDROmorphone HCl PF (DILAUDID) injection 1 mg  1 mg IntraVENous Q3H PRN Patience Tu, DO   1 mg at 02/23/22 0454    0.9 % sodium chloride infusion   IntraVENous PRN Qi Reynolds MD        HYDROmorphone HCl PF (DILAUDID) injection 0.5 mg  0.5 mg IntraVENous Q3H PRN Qi Reynolds MD   0.5 mg at 02/23/22 2610    0.9 % sodium chloride infusion   IntraVENous Continuous Qi Reynolds  mL/hr at 02/22/22 2007 NoRateChange at 02/22/22 2007    dilTIAZem (CARDIZEM CD) extended release capsule 120 mg  120 mg Oral Daily Qi Reynolds MD        metoprolol succinate (TOPROL XL) extended release tablet 25 mg  25 mg Oral Daily Qi Reynolds MD        sodium chloride flush 0.9 % injection 5-40 mL  5-40 mL IntraVENous 2 times per day Qi Reynolds MD   10 mL at 02/23/22 0910    sodium chloride flush 0.9 % injection 5-40 mL  5-40 mL IntraVENous PRN Qi Reynolds MD        0.9 % sodium chloride infusion  25 mL IntraVENous PRN Qi Reynolds MD        [Held by provider] enoxaparin (LOVENOX) injection 40 mg  40 mg SubCUTAneous Daily Qi Reynolds MD        acetaminophen (TYLENOL) tablet 650 mg  650 mg Oral Q6H PRN Qi Reynolds MD   650 mg at 02/22/22 1910    Or    acetaminophen (TYLENOL) suppository 650 mg  650 mg Rectal Q6H PRN Qi Reynolds MD        meropenem (MERREM) 1000 mg in sodium chloride 0.9% 50 mL (duplex)  1,000 mg IntraVENous Q24H Qi Reynolds MD   Stopped at 02/22/22 1913    norepinephrine (LEVOPHED) 16 mg in sodium chloride 0.9 % 250 mL infusion (weight-based)  0.01-3.3 mcg/kg/min IntraVENous Continuous Qi Reynolds MD   Stopped at 02/23/22 0700    melatonin capsule 10 mg  10 mg Oral Nightly PRN Patience Tu, DO   10 mg at 02/22/22 2321         sodium chloride      sodium chloride 100 mL/hr at 02/22/22 2007    sodium chloride      norepinephrine Stopped (02/23/22 0700)        Objective:   /73   Pulse 77   Temp 97.2 °F (36.2 °C) (Temporal)   Resp 26   Ht 6' 3\" (1.905 m)   Wt 290 lb (131.5 kg)   SpO2 96%   BMI 36.25 kg/m²     General: no acute distress  HEENT: normocephalic, atraumatic  Neck: supple, symmetrical, trachea midline   Lungs: clear to auscultation bilaterally   Cardiovascular: s1 and s2 normal  Abdomen: soft, positive bowel sounds  Extremities: no edema or cyanosis   Neuro: aaox3, no focal deficits   Skin: normal color and texture     Recent Labs     02/22/22  1256 02/23/22  0149   WBC 2.7* 22.5*   RBC 5.21 4.33*   HGB 13.2* 11.2*   HCT 44.5 38.7*   MCV 85.4 89.4   MCH 25.3* 25.9*   MCHC 29.7* 28.9*    174     Recent Labs     02/22/22  1256 02/22/22  1340 02/23/22  0149     --  137   K 4.5 4.1 6.3*   ANIONGAP 13  --  11     --  106   CO2 21*  --  20*   BUN 36*  --  37*   CREATININE 3.1*  --  3.7*   GLUCOSE 90  --  122*   CALCIUM 8.8  --  8.0*     No results for input(s): MG, PHOS in the last 72 hours. Recent Labs     02/22/22  1256   AST 27   ALT 35   BILITOT 1.1   ALKPHOS 103     No results for input(s): PH, PO2, PCO2, HCO3, BE, O2SAT in the last 72 hours. Recent Labs     02/22/22  1256   [address] <0.01     Recent Labs     02/22/22  1256 02/23/22  0149 02/23/22  1155   INR 1.41* 1.40* 1.51*     No results for input(s): LACTA in the last 72 hours. Intake/Output Summary (Last 24 hours) at 2/23/2022 1419  Last data filed at 2/23/2022 1135  Gross per 24 hour   Intake 1965 ml   Output 1430 ml   Net 535 ml       NM LUNG SCAN PERFUSION ONLY    Result Date: 2/22/2022  History: Hypoxia Nuclear medicine perfusion scan: Following IV injection of 5.5 mCi of technetium 99m MAA particles, multiple projectional images the chest were obtained.  COMPARISON: Chest x-ray 2/22/2022 FINDINGS: There is homogeneous tracer activity of the bilateral lung parenchyma with no segmental perfusion defect. Findings consistent with very low probability of pulmonary emboli. 1. Very low probability of pulmonary emboli. S[facility] Dr [facility]    [facility] [place] Roll    Result Date: 2/22/2022  Radiology exam is complete. No Radiologist dictation. Please follow up with ordering provider. CT KIDNEY WO CONTRAST    Result Date: 2/22/2022  CT abdomen and pelvis 2/22/2022 2:55 PM HISTORY: Right flank pain, renal lithotripsy 1/6/2022 TECHNIQUE: Axial images of the abdomen and pelvis were obtained without IV contrast. Coronal and sagittal reformatted images are reconstructed and reviewed. COMPARISON: 8/12/2018. DLP: 1626 mGy cm Automated exposure control was utilized to minimize patient radiation dose. FINDINGS: There is a 5 mm right proximal to mid ureteral stone positioned at the L4 level resulting in mild to moderate right-sided hydronephrosis. There are a few punctate nonobstructing bilateral intrarenal stones. There is left renal atrophy. Inseparable from the inferior pole of the right kidney at the site of a previous 2.6 cm simple appearing cyst, there is a 4.4 x 4.5 x 4.4 cm hypodense collection with a few regional air bubbles is stranding of the adjacent perinephric fat as well as right so as muscle. Finding is concerning for an infected renal cyst. Bladder is under distended. Prostate does not appear enlarged. Fatty-containing left inguinal hernia. Probable hepatic steatosis. The nonenhanced liver, spleen, pancreas, gallbladder, and adrenal glands are otherwise unremarkable. No free intraperitoneal air or loculated abscess. Normal appendix. No bowel obstruction. Decompressed stomach. Mild vascular calcification with ectasia of the aorta to maximum measurement of 3.2 cm. No pathologic lymphadenopathy identified. Atelectasis of the visible lung bases. No focal destructive osseous lesions.     1. There is a 5 mm stone within the right proximal to mid ureter positioned at the L4 level creating mild to moderate right-sided hydronephrosis. 2. There is an increasing size cystic lesion/collection within the inferior pole of the right kidney at the site of a prior 2.6 cm renal cyst now measuring 4.5 cm containing few air bubbles with adjacent perinephric and right psoas stranding concerning for an infected right renal cyst. 3. Hepatic steatosis. 4. Atherosclerotic changes of the aorta with ectasia to measurement of 3.2 cm Signed by Dr Christensen Solid    XR CHEST PORTABLE    Result Date: 2/22/2022  XR CHEST PORTABLE 2/22/2022 2:40 PM History: Short of breath. Confusion. Portable chest x-ray. No comparison. Heart size is within normal limits. The mediastinum has a normal appearance. The lungs are adequately expanded with no pneumonia or pneumothorax. There is mild chronic interstitial disease with scattered calcified granulomas. There is no significant pleural fluid. No congestive failure changes. 1. No acute disease.  Signed by Dr Jessica Velasquez and Plan:   Septic shock  Secondary to below  Agents as below  Follow cultures  IVF  Norepinephrine gtt since weaned off  Lactate improved    Right obstructive pyelonephritis  Urology following  Right ureteral stone s/p stent placement  Infected right renal cyst with plans for percutaneous drain placement  History of ESBL E. Coli  Broad-spectrum antibiotics while awaiting cultures    GNR bacteremia  Suspect secondary to above  Agents as above  Follow cultures    GUICHO/hyperkalemia  Secondary to above  Lokelma  IVF  Avoid offending agents  Follow repeat K  Follow renal function/urine output/electrolytes    DVT prophylaxis  SCDs while Coumadin held    Total critical care time: 46 minutes    Amari Dockery MD   2/23/2022 2:19 PM

## 2022-02-23 NOTE — PROGRESS NOTES
Date of the Proposed Procedure:   2/23/2022     Proposed Procedure:  Right kidney abscess    Radiologist:  Glynn Najjar, MD    Indications:  Abnormal radiology scan and right renal abscess. American Society of Anesthesiologists (ASA) Classification:  ASA 2 - Patient with mild systemic disease with no functional limitations    Plan of Sedation:  Moderate Sedation    Mallampati Classification: III (soft palate, base of uvula visible)    Prior to procedure:  I have reviewed the recent H&P from the referring physician, or other provider, related to ordered procedure. No interval changes were found upon examination/review since the original History and Physical / Consult Note. I have performed a pre-procedure assessment of this patient on 2/23/2022, in the CT procedure room, in the presence of a  CT Tech. I discussed with the patient,  in detail,  the risks, benefits, and alternatives to this procedure. Patient/Guardian is aware there are risks associated with moderate sedation which includes transient drop in blood pressure and need for assisted ventilation. Plan for discharge:  Discharge patient after post procedure ordered recovery time. Post azam score at pre-procedure baseline, score of at least 8 prior to discharge.       Glynn Najjar, MD  9/33/76713:31 PM

## 2022-02-23 NOTE — ANESTHESIA PRE PROCEDURE
Department of Anesthesiology  Preprocedure Note       Name:  Obi Will   Age:  73 y.o.  :  1950                                          MRN:  [id number]         Date:  2022      Surgeon: Yury Dias):  Angelique Silva MD    Procedure: Procedure(s):  CYSTOSCOPY ,URETEROSCOPY WITH URETERAL STENT INSERTION    Medications prior to admission:   Prior to Admission medications    Medication Sig Start Date End Date Taking? Authorizing Provider   diphenhydrAMINE (BENADRYL) 25 MG tablet Take 25 mg by mouth nightly as needed for Sleep    Historical Provider, MD   Enoxaparin Sodium (LOVENOX SC) Inject 40 mg into the skin 2 times daily lovenox bridge; pt unsure of dose     Historical Provider, MD   dilTIAZem (CARDIZEM 12 HR) 120 MG extended release capsule Take 120 mg by mouth 2 times daily    Historical Provider, MD   lisinopril (PRINIVIL;ZESTRIL) 20 MG tablet Take 20 mg by mouth 2 times daily  9/10/21   Historical Provider, MD   tamsulosin (FLOMAX) 0.4 MG capsule Take 0.4 mg by mouth daily  21   Historical Provider, MD   diclofenac (VOLTAREN) 50 MG EC tablet Take 50 mg by mouth 2 times daily  21   Historical Provider, MD   CARTIA  MG extended release capsule TAKE 1 CAPSULE BY MOUTH EVERY DAY  Patient not taking: Reported on 2021   Historical Provider, MD   warfarin (COUMADIN) 1 MG tablet Take 5 mg by mouth daily     Historical Provider, MD   clotrimazole-betamethasone (LOTRISONE) 1-0.05 % cream Apply topically 2 times daily. 3/5/19   Danielle Pacheco PA-C   bacitracin-polymyxin b (POLYSPORIN) [id number] UNIT/GM ointment Apply to incision TID  Patient not taking: Reported on 2021   Angelique Silva MD   betamethasone dipropionate (DIPROLENE) 0.05 % cream Apply topically 2 times daily.   Patient not taking: Reported on 2021   SANTI Pelletier   metoprolol succinate (TOPROL XL) 25 MG extended release tablet Take 1 tablet by mouth daily 17   Hien Roberto [place], SANTI   Diphenhydramine-APAP, sleep, (TYLENOL PM EXTRA STRENGTH PO) Take 2 capsules by mouth nightly as needed     Historical Provider, MD       Current medications:    Current Facility-Administered Medications   Medication Dose Route Frequency Provider Last Rate Last Admin    0.9 % sodium chloride infusion   IntraVENous Continuous Nir Mills,  mL/hr at 02/22/22 1633 New Bag at 02/22/22 1633    dilTIAZem (CARDIZEM CD) extended release capsule 120 mg  120 mg Oral Daily Nircorey Mills, DO        metoprolol succinate (TOPROL XL) extended release tablet 25 mg  25 mg Oral Daily Nircorey Mills, DO        sodium chloride flush 0.9 % injection 5-40 mL  5-40 mL IntraVENous 2 times per day Nir Mills, DO        sodium chloride flush 0.9 % injection 5-40 mL  5-40 mL IntraVENous PRN Nir Mills, DO        0.9 % sodium chloride infusion  25 mL IntraVENous PRN Nir Mills, DO        enoxaparin (LOVENOX) injection 40 mg  40 mg SubCUTAneous Daily Nir Mills, DO        acetaminophen (TYLENOL) tablet 650 mg  650 mg Oral Q6H PRN Nir Corrigans, DO        Or    acetaminophen (TYLENOL) suppository 650 mg  650 mg Rectal Q6H PRN Nir Corrigans, DO        lactated ringers bolus  30 mL/kg IntraVENous Once Nir Mills, DO 7,458 mL/hr at 02/22/22 1818 1,000 mL at 02/22/22 1818    meropenem (MERREM) 1000 mg in sodium chloride 0.9% 50 mL (duplex)  1,000 mg IntraVENous Q24H Nir Corriagns,  mL/hr at 02/22/22 1820 1,000 mg at 02/22/22 1820    norepinephrine (LEVOPHED) 16 mg in sodium chloride 0.9 % 250 mL infusion (weight-based)  0.01-3.3 mcg/kg/min IntraVENous Continuous JAYLEN Rock        ciprofloxacin (CIPRO) IVPB 400 mg  400 mg IntraVENous On Call to [address] Michael Thao, APRN - CNP           Allergies: Allergies   Allergen Reactions    Codeine Other (See Comments)     Describes it as a sensitivity.          Problem List:    Patient Active Problem List   Diagnosis Code    ACS (acute coronary syndrome) ([facility] [address] I24.9    Family history of early CAD Z80.55    Essential hypertension I10    CAD (coronary artery disease) I25.10    Long term current use of anticoagulant therapy Z79.01    Renal calculus, right N20.0    Phimosis N47.1    Right ureteral calculus N20.1    Postoperative pain G89.18    UTI due to extended-spectrum beta lactamase (ESBL) producing Escherichia coli N39.0, B96.29, Z16.12    Renal calculus, left N20.0    Obstructive uropathy N13.9       Past Medical History:        Diagnosis Date    ACS (acute coronary syndrome) ([facility] [address] 4/27/2016 4/27/16  ACS  BRANDO RISK Score 1 (angina), AUC indication 3, AUC score 7 4/27/16  Cath  99% 1st diagonal, 2.25 x 15 YURIDIA, anterior lateral hypokinesis, EF 45%     CAD (coronary artery disease)     has seen dr. Luis Dukes in the past    Cerebral artery occlusion with cerebral infarction ([facility] [address] 1998    Difficulty voiding     per pt c/o.     Hyperlipidemia     Hypertension 4/27/2016    Kidney stone     MI (myocardial infarction) ([facility] [address]     stent    Renal calculus, right 8/1/2018    UTI (urinary tract infection)     due to stones; pt has been taking iv antibiotics at home; 1/5/22 last dose       Past Surgical History:        Procedure Laterality Date    CARDIAC CATHETERIZATION      COLONOSCOPY      CYSTOSCOPY INSERTION / REMOVAL STENT / STONE Right 8/28/2018    CYSTOSCOPY STENT REMOVAL performed by Estelle Solitario MD at [address] LITHOTRIPSY Left 1/6/2022    LEFT RENAL EXTRACORPOREAL SHOCK WAVE LITHOTRIPSY performed by Estelle Solitario MD at [address] CIRCUMCISION,OTHER,28+ D/O N/A 8/21/2018    DORSAL SLIT performed by Estelle Solitario MD at [facility] &[place]WE[place] STENT INSRT Right 8/21/2018    CYSTOSCOPY URETEROSCOPY LASER LITHOTRIPSY performed by Estelle Solitario MD at [facility] ESWL Right 8/28/2018    ESWL EXTRACORPEAL SHOCK WAVE LITHOTRIPSY performed by Torin Seals MD at [place] [address] History:    Social History     Tobacco Use    Smoking status: Former Smoker     Packs/day: 3.00     Years: 35.00     Pack years: 105.00    Smokeless tobacco: Former User     Quit date: 4/27/2001   Substance Use Topics    Alcohol use: No                                Counseling given: Not Answered      Vital Signs (Current):   Vitals:    02/22/22 1638 02/22/22 1647 02/22/22 1745 02/22/22 1802   BP: 93/63 (!) 70/54 (!) 111/92 116/66   Pulse: 105 109 116 118   Resp: (!) 36 18 (!) 40 25   Temp:       SpO2: 98% (!) 79% (!) 88% 100%   Weight:       Height:                                                  BP Readings from Last 3 Encounters:   02/22/22 116/66   01/06/22 (!) 150/86   01/06/22 111/67       NPO Status:                                                                                 BMI:   Wt Readings from Last 3 Encounters:   02/22/22 274 lb (124.3 kg)   01/05/22 280 lb (127 kg)   01/06/22 285 lb (129.3 kg)     Body mass index is 34.25 kg/m². CBC:   Lab Results   Component Value Date    WBC 2.7 02/22/2022    RBC 5.21 02/22/2022    HGB 13.2 02/22/2022    HCT 44.5 02/22/2022    MCV 85.4 02/22/2022    RDW 13.6 02/22/2022     02/22/2022       CMP:   Lab Results   Component Value Date     02/22/2022    K 4.1 02/22/2022    K 4.5 02/22/2022     02/22/2022    CO2 21 02/22/2022    BUN 36 02/22/2022    CREATININE 3.1 02/22/2022    GFRAA 24 02/22/2022    LABGLOM 20 02/22/2022    GLUCOSE 90 02/22/2022    PROT 7.7 02/22/2022    CALCIUM 8.8 02/22/2022    BILITOT 1.1 02/22/2022    ALKPHOS 103 02/22/2022    AST 27 02/22/2022    ALT 35 02/22/2022       POC Tests: No results for input(s): POCGLU, POCNA, POCK, POCCL, POCBUN, POCHEMO, POCHCT in the last 72 hours.     Coags:   Lab Results   Component Value Date    PROTIME 17.3 02/22/2022    INR 1.41 02/22/2022    APTT 29.8 02/22/2022       HCG (If Applicable): No results found for: Regine Sneddon, HCG, HCGQUANT     ABGs:   Lab Results   Component Value Date    PHART 7.380 02/22/2022    PO2ART 94.0 02/22/2022    UAC4GTL 35.0 02/22/2022    FGL6NIV 20.7 02/22/2022    BEART -3.8 02/22/2022    Q1QRDQYO 95.2 02/22/2022        Type & Screen (If Applicable):  No results found for: LABABO, LABRH    Drug/Infectious Status (If Applicable):  No results found for: HIV, HEPCAB    COVID-19 Screening (If Applicable):   Lab Results   Component Value Date    COVID19 Not Detected 02/22/2022           Anesthesia Evaluation  Patient summary reviewed and Nursing notes reviewed  Airway: Mallampati: III  TM distance: >3 FB   Neck ROM: full  Mouth opening: > = 3 FB Dental:    (+) upper dentures and lower dentures      Pulmonary: breath sounds clear to auscultation  (+) shortness of breath: new,            Patient did not smoke on day of surgery. Cardiovascular:    (+) hypertension:, past MI: > 6 months, CAD:, CABG/stent:,       ECG reviewed  Rhythm: regular  Rate: abnormal                    Neuro/Psych:   (+) CVA:,             GI/Hepatic/Renal: Neg GI/Hepatic/Renal ROS            Endo/Other: Negative Endo/Other ROS                    Abdominal:             Vascular: negative vascular ROS. Other Findings:           Anesthesia Plan      general     ASA 4 - emergent       Induction: intravenous. MIPS: Postoperative opioids intended and Prophylactic antiemetics administered. Anesthetic plan and risks discussed with patient. Use of blood products discussed with patient whom consented to blood products. Plan discussed with CRNA.                   SANTI Velásquez - SHWETA   2/22/2022

## 2022-02-23 NOTE — PROGRESS NOTES
Urology Progress Note      SUBJECTIVE: Patient remained stable overnight he complained of some right-sided lower quadrant pain and pain and pain is from the catheter    OBJECTIVE: Antibiotic day #1 [facility] he had a temp 101 yesterday but none overnight he is not required any pressors for hypotension    REVIEW OF SYSTEMS:   Review of Systems   Constitutional: Positive for fatigue and fever. HENT: Negative. Eyes: Negative. Respiratory: Positive for shortness of breath. Cardiovascular: Negative. Gastrointestinal: Negative for diarrhea, nausea and vomiting. Genitourinary: Positive for dysuria and flank pain. Neurological: Positive for dizziness. All other systems reviewed and are negative. Physical  VITALS:  /73   Pulse 77   Temp 97.2 °F (36.2 °C) (Temporal)   Resp 26   Ht 6' 3\" (1.905 m)   Wt 290 lb (131.5 kg)   SpO2 96%   BMI 36.25 kg/m²   TEMPERATURE:  Current - Temp: 97.2 °F (36.2 °C);  Max - Temp  Av.6 °F (38.1 °C)  Min: 96.5 °F (35.8 °C)  Max: 102.2 °F (39 °C)   24 HR I&O   Intake/Output Summary (Last 24 hours) at 2022 1324  Last data filed at 2022 1135  Gross per 24 hour   Intake 1965 ml   Output 1430 ml   Net 535 ml     BACK: flank tenderness: right  ABDOMEN:  soft, non-distended and tenderness noted in the right lower quadrant  HEART:  normal rate and regular rhythm  CHEST: Normal respiratory effort no distress  GENITAL/URINARY: Reardon catheter in place draining clear urine    Data       CBC:   Recent Labs     22  1256 22  0149   WBC 2.7* 22.5*   HGB 13.2* 11.2*   HCT 44.5 38.7*    174     BMP:    Recent Labs     22  1256 22  1340 22  0149     --  137   K 4.5 4.1 6.3*     --  106   CO2 21*  --  20*   BUN 36*  --  37*   CREATININE 3.1*  --  3.7*   GLUCOSE 90  --  122*       Recent Labs     22  1933   LABURIN No growth     Recent Labs     22  1256   BC **THIS IS A CORRECTED REPORT**   Bottle volume = 9 ml  Gram stain Anaerobic bottle:  Gram negative rods  Culture in progress  Please notify Physician  **No Gram Positive Rods as previously reported**   Bottle volume = 8 ml  Gram stain Aerobic bottle:  Gram negative rods  Culture in progress  Please notify Physician  **No Gram Positive Rods as previously reported**  *     Recent Labs     02/22/22  1327   BLOODCULT2  Bottle volume = 8 ml  Gram stain Anaerobic bottle:  Gram negative rods  Culture in progress  Please notify Physician  *     Urine and blood cultures positive for E. coli sensitivities pending      ASSESSMENT AND PLAN    Patient Active Problem List   Diagnosis    ACS (acute [facility] syndrome) ([facility] [address]    Family history of early CAD    Essential hypertension    CAD (coronary artery disease)    Long term current use of anticoagulant therapy    Renal calculus, right    Phimosis    Right ureteral calculus    Postoperative pain    UTI due to extended-spectrum beta lactamase (ESBL) producing Escherichia coli    Renal calculus, left    Obstructive pyelonephritis    GUICHO (acute kidney injury) ([facility] [address]    Infected kidney cyst, right    Sepsis ([facility] [address]       1. Sepsis secondary to obstructive uropathy from right ureteral stone status post right stent placement and infected right renal cyst.  Patient has history of ESBL positive E. coli. Preliminary cultures positive for E. coli on Merrem. We will get infectious disease consultation. Otherwise continue ICU monitoring continue resuscitation. 2.  Right obstructive pyelonephritis secondary to right proximal ureteral calculus. A right ureteral stent has been placed. The stone will have to be treated at a later date once his sepsis and  infection is cleared, and  he has completed a full course antibiotics. 3.  Infected right renal cyst based on CT findings. I discussed with the radiologist we will plan for percutaneous drain placement.   His INR is slightly elevated we will give him vitamin K and FFP preprocedure. 4.  GUICHO with associated hyperkalemia. Is actually gotten a little worse since stent placement urine output okay. Otherwise management per hospitalist service. Hopefully this will spontaneously recover as his sepsis improves.     Reynold Peck MD

## 2022-02-23 NOTE — OP NOTE
Brief operative Note      Patient: Dao Lewis  YOB: 1950  MRN: [id number]    Date of Procedure: 2/22/2022    Pre-Op Diagnosis: .  1.  Obstructing right proximal ureteral calculus 2. Obstructive right pyelonephritis with sepsis 3. GUICHO. Post-Op Diagnosis: Same       Procedure(s):  CYSTOSCOPY , right URETERAL STENT INSERTION 5 [place] by 26 cm    Surgeon(s):  Amanda Templeton MD    Assistant:   * No surgical staff found *    Anesthesia: General    Estimated Blood Loss (mL): 0    Complications: None    Specimens:   ID Type Source Tests Collected by Time Destination   1 : RIGHT KIDNEY URINE Urine Kidney CULTURE, URINE Amanda Templeton MD 2/22/2022 2038    2 : BLADDER URINE Urine Bladder CULTURE, URINE Amanda Templeton MD 2/22/2022 2033        Implants:  Implant Name Type Inv. Item Serial No.  Lot No. LRB No. Used Action   STENT URET 5FR L26CM PERCFLX HYDR+ DBL PGTL THRD 2 - [id number]  STENT URET 5FR L26CM PERCFLX HYDR+ DBL PGTL THRD 2  [employer] [facility]OLOG[facility]- [id number] Right 1 Implanted         Drains:   Urethral Catheter Double-lumen 18 fr (Active)       Findings: Urine from the bladder collected for culture this was grossly cloudy. Urine collected from the right renal pelvis was also grossly cloudy but not purulent. Proximal right ureteral stone was able to place a 5 [place] by 26 cm stent passed the stone. Detailed Description of Procedure:   See dictated report:    Disposition to PACU in the ICU critical condition continue resuscitation continue antibiotics follow-up on cultures.     Electronically signed by Sally Cho MD on 2/22/2022 at 8:44 PM

## 2022-02-24 LAB
ALBUMIN SERPL-MCNC: 2.7 G/DL (ref 3.5–5.2)
ALP BLD-CCNC: 72 U/L (ref 40–130)
ALT SERPL-CCNC: 30 U/L (ref 5–41)
ANION GAP SERPL CALCULATED.3IONS-SCNC: 10 MMOL/L (ref 7–19)
ANION GAP SERPL CALCULATED.3IONS-SCNC: 11 MMOL/L (ref 7–19)
AST SERPL-CCNC: 32 U/L (ref 5–40)
BASOPHILS ABSOLUTE: 0 K/UL (ref 0–0.2)
BASOPHILS RELATIVE PERCENT: 0 % (ref 0–1)
BILIRUB SERPL-MCNC: 0.3 MG/DL (ref 0.2–1.2)
BLOOD CULTURE, ROUTINE: ABNORMAL
BLOOD CULTURE, ROUTINE: ABNORMAL
BUN BLDV-MCNC: 43 MG/DL (ref 8–23)
BUN BLDV-MCNC: 43 MG/DL (ref 8–23)
CALCIUM SERPL-MCNC: 8.3 MG/DL (ref 8.8–10.2)
CALCIUM SERPL-MCNC: 8.7 MG/DL (ref 8.8–10.2)
CHLORIDE BLD-SCNC: 109 MMOL/L (ref 98–111)
CHLORIDE BLD-SCNC: 109 MMOL/L (ref 98–111)
CO2: 21 MMOL/L (ref 22–29)
CO2: 22 MMOL/L (ref 22–29)
CREAT SERPL-MCNC: 2.7 MG/DL (ref 0.5–1.2)
CREAT SERPL-MCNC: 2.7 MG/DL (ref 0.5–1.2)
EKG P AXIS: 36 DEGREES
EKG P-R INTERVAL: 142 MS
EKG Q-T INTERVAL: 314 MS
EKG QRS DURATION: 94 MS
EKG QTC CALCULATION (BAZETT): 426 MS
EKG T AXIS: 64 DEGREES
EOSINOPHILS ABSOLUTE: 0 K/UL (ref 0–0.6)
EOSINOPHILS RELATIVE PERCENT: 0 % (ref 0–5)
GFR AFRICAN AMERICAN: 28
GFR AFRICAN AMERICAN: 28
GFR NON-AFRICAN AMERICAN: 23
GFR NON-AFRICAN AMERICAN: 23
GLUCOSE BLD-MCNC: 124 MG/DL (ref 74–109)
GLUCOSE BLD-MCNC: 127 MG/DL (ref 70–99)
GLUCOSE BLD-MCNC: 129 MG/DL (ref 74–109)
HCT VFR BLD CALC: 36.2 % (ref 42–52)
HEMOGLOBIN: 10.6 G/DL (ref 14–18)
IMMATURE GRANULOCYTES #: 0.7 K/UL
LYMPHOCYTES ABSOLUTE: 0.4 K/UL (ref 1.1–4.5)
LYMPHOCYTES RELATIVE PERCENT: 2 % (ref 20–40)
MAGNESIUM: 2.2 MG/DL (ref 1.6–2.4)
MCH RBC QN AUTO: 25.4 PG (ref 27–31)
MCHC RBC AUTO-ENTMCNC: 29.3 G/DL (ref 33–37)
MCV RBC AUTO: 86.8 FL (ref 80–94)
MONOCYTES ABSOLUTE: 0.8 K/UL (ref 0–0.9)
MONOCYTES RELATIVE PERCENT: 4 % (ref 0–10)
NEUTROPHILS ABSOLUTE: 18 K/UL (ref 1.5–7.5)
NEUTROPHILS RELATIVE PERCENT: 94 % (ref 50–65)
ORGANISM: ABNORMAL
PDW BLD-RTO: 14.1 % (ref 11.5–14.5)
PERFORMED ON: ABNORMAL
PLATELET # BLD: 148 K/UL (ref 130–400)
PLATELET SLIDE REVIEW: ADEQUATE
PMV BLD AUTO: 12 FL (ref 9.4–12.4)
POTASSIUM REFLEX MAGNESIUM: 4.9 MMOL/L (ref 3.5–5)
POTASSIUM SERPL-SCNC: 4.9 MMOL/L (ref 3.5–5)
RBC # BLD: 4.17 M/UL (ref 4.7–6.1)
SODIUM BLD-SCNC: 141 MMOL/L (ref 136–145)
SODIUM BLD-SCNC: 141 MMOL/L (ref 136–145)
TOTAL PROTEIN: 5.9 G/DL (ref 6.6–8.7)
URINE CULTURE, ROUTINE: ABNORMAL
WBC # BLD: 19.2 K/UL (ref 4.8–10.8)

## 2022-02-24 PROCEDURE — 6370000000 HC RX 637 (ALT 250 FOR IP): Performed by: INTERNAL MEDICINE

## 2022-02-24 PROCEDURE — 6360000002 HC RX W HCPCS: Performed by: INTERNAL MEDICINE

## 2022-02-24 PROCEDURE — 99024 POSTOP FOLLOW-UP VISIT: CPT | Performed by: UROLOGY

## 2022-02-24 PROCEDURE — 2700000000 HC OXYGEN THERAPY PER DAY

## 2022-02-24 PROCEDURE — 85025 COMPLETE CBC W/AUTO DIFF WBC: CPT

## 2022-02-24 PROCEDURE — 36415 COLL VENOUS BLD VENIPUNCTURE: CPT

## 2022-02-24 PROCEDURE — 93010 ELECTROCARDIOGRAM REPORT: CPT | Performed by: INTERNAL MEDICINE

## 2022-02-24 PROCEDURE — 83735 ASSAY OF MAGNESIUM: CPT

## 2022-02-24 PROCEDURE — 6370000000 HC RX 637 (ALT 250 FOR IP): Performed by: UROLOGY

## 2022-02-24 PROCEDURE — 6360000002 HC RX W HCPCS: Performed by: UROLOGY

## 2022-02-24 PROCEDURE — 2580000003 HC RX 258: Performed by: INTERNAL MEDICINE

## 2022-02-24 PROCEDURE — 1210000000 HC MED SURG R&B

## 2022-02-24 PROCEDURE — 82947 ASSAY GLUCOSE BLOOD QUANT: CPT

## 2022-02-24 PROCEDURE — 80053 COMPREHEN METABOLIC PANEL: CPT

## 2022-02-24 PROCEDURE — 2580000003 HC RX 258: Performed by: UROLOGY

## 2022-02-24 RX ORDER — FENTANYL CITRATE 50 UG/ML
100 INJECTION, SOLUTION INTRAMUSCULAR; INTRAVENOUS ONCE
Status: DISCONTINUED | OUTPATIENT
Start: 2022-02-23 | End: 2022-02-24

## 2022-02-24 RX ORDER — MELATONIN 10 MG
10 CAPSULE ORAL NIGHTLY
Status: DISCONTINUED | OUTPATIENT
Start: 2022-02-24 | End: 2022-02-28 | Stop reason: HOSPADM

## 2022-02-24 RX ORDER — SODIUM CHLORIDE 9 MG/ML
25 INJECTION, SOLUTION INTRAVENOUS PRN
Status: DISCONTINUED | OUTPATIENT
Start: 2022-02-24 | End: 2022-02-28 | Stop reason: HOSPADM

## 2022-02-24 RX ORDER — SODIUM CHLORIDE, SODIUM LACTATE, POTASSIUM CHLORIDE, CALCIUM CHLORIDE 600; 310; 30; 20 MG/100ML; MG/100ML; MG/100ML; MG/100ML
INJECTION, SOLUTION INTRAVENOUS CONTINUOUS
Status: DISCONTINUED | OUTPATIENT
Start: 2022-02-24 | End: 2022-02-28 | Stop reason: HOSPADM

## 2022-02-24 RX ORDER — SODIUM CHLORIDE 0.9 % (FLUSH) 0.9 %
5-40 SYRINGE (ML) INJECTION PRN
Status: DISCONTINUED | OUTPATIENT
Start: 2022-02-24 | End: 2022-02-28 | Stop reason: HOSPADM

## 2022-02-24 RX ORDER — LIDOCAINE HYDROCHLORIDE 10 MG/ML
5 INJECTION, SOLUTION EPIDURAL; INFILTRATION; INTRACAUDAL; PERINEURAL ONCE
Status: DISCONTINUED | OUTPATIENT
Start: 2022-02-24 | End: 2022-02-28 | Stop reason: HOSPADM

## 2022-02-24 RX ORDER — MIDAZOLAM HYDROCHLORIDE 1 MG/ML
2 INJECTION INTRAMUSCULAR; INTRAVENOUS ONCE
Status: DISCONTINUED | OUTPATIENT
Start: 2022-02-23 | End: 2022-02-24

## 2022-02-24 RX ORDER — SODIUM CHLORIDE 0.9 % (FLUSH) 0.9 %
5-40 SYRINGE (ML) INJECTION EVERY 12 HOURS SCHEDULED
Status: DISCONTINUED | OUTPATIENT
Start: 2022-02-24 | End: 2022-02-28 | Stop reason: HOSPADM

## 2022-02-24 RX ADMIN — HYDROMORPHONE HYDROCHLORIDE 1 MG: 1 INJECTION, SOLUTION INTRAMUSCULAR; INTRAVENOUS; SUBCUTANEOUS at 14:34

## 2022-02-24 RX ADMIN — HYDROMORPHONE HYDROCHLORIDE 1 MG: 1 INJECTION, SOLUTION INTRAMUSCULAR; INTRAVENOUS; SUBCUTANEOUS at 21:21

## 2022-02-24 RX ADMIN — HYDROMORPHONE HYDROCHLORIDE 0.5 MG: 1 INJECTION, SOLUTION INTRAMUSCULAR; INTRAVENOUS; SUBCUTANEOUS at 03:46

## 2022-02-24 RX ADMIN — MEROPENEM AND SODIUM CHLORIDE 1000 MG: 1 INJECTION, SOLUTION INTRAVENOUS at 17:49

## 2022-02-24 RX ADMIN — SODIUM CHLORIDE, POTASSIUM CHLORIDE, SODIUM LACTATE AND CALCIUM CHLORIDE: 600; 310; 30; 20 INJECTION, SOLUTION INTRAVENOUS at 17:02

## 2022-02-24 RX ADMIN — Medication 10 MG: at 21:21

## 2022-02-24 RX ADMIN — SODIUM CHLORIDE, PRESERVATIVE FREE 10 ML: 5 INJECTION INTRAVENOUS at 07:27

## 2022-02-24 RX ADMIN — ENOXAPARIN SODIUM 40 MG: 100 INJECTION SUBCUTANEOUS at 17:45

## 2022-02-24 RX ADMIN — SODIUM CHLORIDE, POTASSIUM CHLORIDE, SODIUM LACTATE AND CALCIUM CHLORIDE: 600; 310; 30; 20 INJECTION, SOLUTION INTRAVENOUS at 14:34

## 2022-02-24 RX ADMIN — HYDROMORPHONE HYDROCHLORIDE 0.5 MG: 1 INJECTION, SOLUTION INTRAMUSCULAR; INTRAVENOUS; SUBCUTANEOUS at 08:02

## 2022-02-24 RX ADMIN — DILTIAZEM HYDROCHLORIDE 120 MG: 120 CAPSULE, COATED, EXTENDED RELEASE ORAL at 07:27

## 2022-02-24 ASSESSMENT — PAIN DESCRIPTION - DESCRIPTORS
DESCRIPTORS: CONSTANT;DISCOMFORT
DESCRIPTORS: CONSTANT

## 2022-02-24 ASSESSMENT — PAIN DESCRIPTION - FREQUENCY
FREQUENCY: INTERMITTENT
FREQUENCY: CONTINUOUS
FREQUENCY: CONTINUOUS

## 2022-02-24 ASSESSMENT — PAIN - FUNCTIONAL ASSESSMENT
PAIN_FUNCTIONAL_ASSESSMENT: PREVENTS OR INTERFERES SOME ACTIVE ACTIVITIES AND ADLS
PAIN_FUNCTIONAL_ASSESSMENT: PREVENTS OR INTERFERES SOME ACTIVE ACTIVITIES AND ADLS

## 2022-02-24 ASSESSMENT — PAIN DESCRIPTION - PROGRESSION
CLINICAL_PROGRESSION: NOT CHANGED
CLINICAL_PROGRESSION: GRADUALLY IMPROVING
CLINICAL_PROGRESSION: GRADUALLY IMPROVING

## 2022-02-24 ASSESSMENT — PAIN SCALES - GENERAL
PAINLEVEL_OUTOF10: 0
PAINLEVEL_OUTOF10: 4
PAINLEVEL_OUTOF10: 4
PAINLEVEL_OUTOF10: 6
PAINLEVEL_OUTOF10: 5
PAINLEVEL_OUTOF10: 0
PAINLEVEL_OUTOF10: 0
PAINLEVEL_OUTOF10: 4

## 2022-02-24 ASSESSMENT — PAIN DESCRIPTION - LOCATION
LOCATION: FLANK
LOCATION: BACK;FLANK
LOCATION: FLANK;BACK

## 2022-02-24 ASSESSMENT — PAIN DESCRIPTION - PAIN TYPE
TYPE: ACUTE PAIN;CHRONIC PAIN
TYPE: ACUTE PAIN
TYPE: SURGICAL PAIN

## 2022-02-24 ASSESSMENT — PAIN DESCRIPTION - ONSET
ONSET: ON-GOING

## 2022-02-24 ASSESSMENT — PAIN DESCRIPTION - ORIENTATION
ORIENTATION: RIGHT;LOWER
ORIENTATION: RIGHT
ORIENTATION: RIGHT

## 2022-02-24 NOTE — CONSULTS
INFECTIOUS DISEASES CONSULT NOTE    Patient:  Bren Weems 73 y.o. male  ROOM # [unfilled]  YOB: 1950  MRN: [id number]  CSN:  [id number]  Admit date: 2/22/2022   Admitting Physician: Karo Mclaughlin MD  Primary Care Physician: London Vasquez MD  REFERRING PROVIDER: No ref. provider found    Reason for Consultation: Sepsis. History ESBL positive E. coli obstructive pyelonephritis, infected renal cyst.    History of Present Illness/Chief Complaint: Pleasant 33-year-old man. He indicates he has had symptoms intermittently since March of last year. He would get symptoms of UTI. He indicates he would get courses of antibiotics with improvement. He would then fairly rapidly have recurrences. He recently completed a course of antibiotic treatment. He apparently has had ESBL positive E. coli grow in the past.  He presented with right flank pain. He had diffuse weakness. He has had previous nephrolithiasis and underwent lithotripsy in early January. He was hypotensive on arrival.  He was admitted to ICU. He has undergone ureteral stenting. He is also placement of a percutaneous drain. He has been receiving antimicrobial therapy with meropenem. Infectious disease asked to evaluate and offer recommendations. Current Scheduled Medications:    dilTIAZem  120 mg Oral Daily    [Held by provider] metoprolol succinate  25 mg Oral Daily    sodium chloride flush  5-40 mL IntraVENous 2 times per day    enoxaparin  40 mg SubCUTAneous Daily    meropenem  1,000 mg IntraVENous Q24H     Current PRN Medications:  HYDROmorphone, sodium chloride, HYDROmorphone, glucagon (rDNA), dextrose, dextrose bolus (hypoglycemia) **AND** POCT Glucose **AND** [COMPLETED] POCT Glucose **AND** POCT Glucose, dextrose bolus (hypoglycemia) **OR** dextrose bolus (hypoglycemia), glucose, sodium chloride flush, sodium chloride, acetaminophen **OR** acetaminophen, melatonin    Allergies:     Allergies   Allergen Reactions    Codeine Other (See Comments)     Describes it as a sensitivity. Past Medical History: Coronary artery disease. Hypertension. Nephrolithiasis. Cerebral artery occlusion. Past Surgical History: Cystoscopy. Ureteral stenting. Lithotripsy. Heart catheterization. Coronary artery stenting. Social History: Previous history of tobacco use. No alcohol use. No illicit drug use. Family History: Cancer. Heart disease. Exposure History: No close contacts have been ill    Review of Systems:   No chest pain or chest pressure  No cough or sputum production  No rash or skin itching  No diarrhea  No headache or other neurological complaints  See HPI for any pertinent positive negatives Ms. complete review of systems    Vital Signs:  /77   Pulse 72   Temp 97 °F (36.1 °C) (Temporal)   Resp 21   Ht 6' 3\" (1.905 m)   Wt 291 lb 2 oz (132.1 kg)   SpO2 95%   BMI 36.39 kg/m²  Temp (24hrs), Av.3 °F (36.3 °C), Min:96.5 °F (35.8 °C), Max:98 °F (36.7 °C)    Physical Exam:   Vital signs reviewed. Alert, pleasant, no distress  Lungs clear to auscultation no crackles  Heart regular rhythm without murmur  Abdomen is soft and nondistended. Minimal tenderness right lateral abdominal and flank area. Drain in place.   No hepatosplenomegaly  Extremities without significant edema  Skin without rash    Lab Results:  CBC:   Recent Labs     22  1256 22  0149 22  0014   WBC 2.7* 22.5* 19.2*   HGB 13.2* 11.2* 10.6*   HCT 44.5 38.7* 36.2*    174 148   LYMPHOPCT 4.8* 2.0* 2.0*   MONOPCT 0.4 0.0 4.0     CMP:   Recent Labs     22  1256 22  1340 22  0149 22  0149 22  1823 22  0014     --  137  --   --  141  141   K 4.5   < > 6.3*   < > 6.3* 4.9  4.9     --  106  --   --  109  109   CO2 21*  --  20*  --   --  22  21*   BUN 36*  --  37*  --   --  43*  43*   CREATININE 3.1*  --  3.7*  --   --  2.7*  2.7*   CALCIUM 8.8  --  8.0*  --   --  8.3*  8.7* BILITOT 1.1  --   --   --   --  0.3   ALKPHOS 103  --   --   --   --  72   ALT 35  --   --   --   --  30   AST 27  --   --   --   --  32   GLUCOSE 90  --  122*  --   --  124*  129*    < > = values in this interval not displayed. Culture:   Urine culture February 22, 2022-growth too young to identify at present  Blood cultures February 22, 2022-gram-negative rods    Radiology:     CT kidney without contrast on February 22, 2022:  Personally reviewed  Impression   1. There is a 5 mm stone within the right proximal to mid ureter   positioned at the L4 level creating mild to moderate right-sided   hydronephrosis. 2. There is an increasing size cystic lesion/collection within the   inferior pole of the right kidney at the site of a prior 2.6 cm renal   cyst now measuring 4.5 cm containing few air bubbles with adjacent   perinephric and right psoas stranding concerning for an infected right   renal cyst.   3. Hepatic steatosis. 4. Atherosclerotic changes of the aorta with ectasia to measurement of   3.2 cm   Signed by Dr Shaniqua Lemons         Impression:   1. Gram-negative bacteremia  2. Obstructive pyelonephritis with abscess  3.   Renal insufficiency    Recommendations:    Suspect cultures will identify ESBL positive organism  Continue empiric treatment with Merrem  IV fluids and supportive care  Seems to be improving with antibiotic treatment, urinary stenting, and abscess drainage  Await final cultures and will adjust antibiotics based on results  Continue to follow    Luis Miguel Weinberg MD  02/24/22  7:11 AM

## 2022-02-24 NOTE — PROGRESS NOTES
Hospitalist Progress Note  [facility]     Patient: Rebecca Phipps  : 1950  MRN: [id number]  Code Status: Full Code    Hospital Day: 2   Date of Service: 2022    Subjective:   Patient seen and examined. Feeling better. Complains of lack of sleep. Past Medical History:   Diagnosis Date    ACS (acute coronary syndrome) ([facility] 2016  ACS  BRANDO RISK Score 1 (angina), AUC indication 3, AUC score 7 16  Cath  99% 1st diagonal, 2.25 x 15 YURIDIA, anterior lateral hypokinesis, EF 45%     CAD (coronary artery disease)     has seen dr. Michelle Metz in the past    Cerebral artery occlusion with cerebral infarction ([facility] [address] [facility]     Difficulty voiding     per pt c/o.     Hyperlipidemia     Hypertension 2016    Kidney stone     MI (myocardial infarction) ([facility] [address] [facility]     stent    Renal calculus, right 2018    UTI (urinary tract infection)     due to stones; pt has been taking iv antibiotics at home; 22 last dose       Past Surgical History:   Procedure Laterality Date    CARDIAC CATHETERIZATION      COLONOSCOPY      CYSTOSCOPY Right 2022    CYSTOSCOPY ,URETEROSCOPY WITH URETERAL STENT INSERTION performed by Manasa Davis MD at [address] [employer] [address] Right 2018    CYSTOSCOPY STENT REMOVAL performed by Manasa Davis MD at [facility] LITHOTRIPSY Left 2022    LEFT RENAL EXTRACORPOREAL SHOCK WAVE LITHOTRIPSY performed by Manasa Davis MD at [facility] CIRCUMCISION,OTHER,28+ D/O N/A 2018    DORSAL SLIT performed by Manasa Davis MD at [address] &INDWELL STENT INSRT Right 2018    CYSTOSCOPY URETEROSCOPY LASER LITHOTRIPSY performed by Manasa Davis MD at [facility] ESWL Right 2018    ESWL EXTRACORPEAL SHOCK WAVE LITHOTRIPSY performed by Manasa Davis MD at [facility] OR       Family History   Problem Relation Age of Onset    Cancer Sister  Cancer Brother     Heart Disease Father        Social History     Socioeconomic History    Marital status:      Spouse name: jessica gunn    Number of children: 2    Years of education: Not on file    Highest education level: Not on file   Occupational History    Occupation: farmer    Tobacco Use    Smoking status: Former Smoker     Packs/day: 3.00     Years: 35.00     Pack years: 105.00    Smokeless tobacco: Former User     Quit date: 4/27/2001   Vaping Use    Vaping Use: Never used   Substance and Sexual Activity    Alcohol use: No    Drug use: No    Sexual activity: Yes     Partners: Female   Other Topics Concern    Not on file   Social History Narrative    Not on file     Social Determinants of Health     Financial Resource Strain:     Difficulty of Paying Living Expenses: Not on file   Food Insecurity:     Worried About [address] in the Last Year: Not on file    Lilian of Food in the Last Year: Not on file   Transportation Needs:     Lack of Transportation (Medical): Not on file    Lack of Transportation (Non-Medical):  Not on file   Physical Activity:     Days of Exercise per Week: Not on file    Minutes of Exercise per Session: Not on file   Stress:     Feeling of Stress : Not on file   Social Connections:     Frequency of Communication with Friends and Family: Not on file    Frequency of Social Gatherings with Friends and Family: Not on file    Attends Islam Services: Not on file    Active Member of Clubs or Organizations: Not on file    Attends Club or Organization Meetings: Not on file    Marital Status: Not on file   Intimate Partner Violence:     Fear of Current or Ex-Partner: Not on file    Emotionally Abused: Not on file    Physically Abused: Not on file    Sexually Abused: Not on file   Housing Stability:     Unable to Pay for Housing in the Last Year: Not on file    Number of [place] in the Last Year: Not on file    Unstable Housing in the Last Year: Not on file       Current Facility-Administered Medications   Medication Dose Route Frequency Provider Last Rate Last Admin    fentaNYL (SUBLIMAZE) injection 100 mcg  100 mcg IntraVENous Once Josi Banuelos MD        midazolam (VERSED) injection 2 mg  2 mg IntraVENous Once Josi Banuelos MD        lidocaine PF 1 % injection 5 mL  5 mL IntraDERmal Once Thang Dougherty MD        sodium chloride flush 0.9 % injection 5-40 mL  5-40 mL IntraVENous 2 times per day Thang Dougherty MD        sodium chloride flush 0.9 % injection 5-40 mL  5-40 mL IntraVENous PRN Thang Dougherty MD        0.9 % sodium chloride infusion  25 mL IntraVENous PRN Thang Dougherty MD        HYDROmorphone HCl PF (DILAUDID) injection 1 mg  1 mg IntraVENous Q3H PRN Patibakari Tu, DO   1 mg at 02/23/22 0454    0.9 % sodium chloride infusion   IntraVENous PRN Lorena Pelaez MD        HYDROmorphone HCl PF (DILAUDID) injection 0.5 mg  0.5 mg IntraVENous Q3H PRN Lorena Pelaez MD   0.5 mg at 02/24/22 0802    glucagon (rDNA) injection 1 mg  1 mg IntraMUSCular PRN Jennifer Oakes, APRN - CNP        dextrose 5 % solution  100 mL/hr IntraVENous PRN Jennifer Oakes, APRN - CNP        dextrose bolus (hypoglycemia) 10% 250 mL  250 mL IntraVENous PRN Jennifer Gourd, APRN - CNP        dextrose bolus (hypoglycemia) 10% 125 mL  125 mL IntraVENous PRN Jennifer Shieldsurd, APRN - CNP        Or    dextrose bolus (hypoglycemia) 10% 250 mL  250 mL IntraVENous PRN Jennifer Oakes, APRN - CNP   Stopped at 02/23/22 2328    glucose chewable tablet 4 each  4 tablet Oral PRN Jennifer Oakes APRN - CNP        0.9 % sodium chloride infusion   IntraVENous Continuous Lorena Pelaez  mL/hr at 02/23/22 2055 Rate Change at 02/23/22 2055    dilTIAZem (CARDIZEM CD) extended release capsule 120 mg  120 mg Oral Daily Lorena Pelaez MD   120 mg at 02/24/22 1000    [Held by provider] metoprolol succinate (TOPROL XL) extended release tablet 25 mg  25 mg Oral Daily Mili Narayanan MD        sodium chloride flush 0.9 % injection 5-40 mL  5-40 mL IntraVENous 2 times per day Mili Narayanan MD   10 mL at 02/24/22 0727    sodium chloride flush 0.9 % injection 5-40 mL  5-40 mL IntraVENous PRN Mili Narayanan MD        0.9 % sodium chloride infusion  25 mL IntraVENous PRN Mili Narayanan MD        enoxaparin (LOVENOX) injection 40 mg  40 mg SubCUTAneous Daily Mili Narayanan MD   40 mg at 02/23/22 1815    acetaminophen (TYLENOL) tablet 650 mg  650 mg Oral Q6H PRN Mili Narayanan MD   650 mg at 02/22/22 1910    Or    acetaminophen (TYLENOL) suppository 650 mg  650 mg Rectal Q6H PRN Mili Narayanan MD        meropenem (MERREM) 1000 mg in sodium chloride 0.9% 50 mL (duplex)  1,000 mg IntraVENous Q24H Mili Narayanan MD   Stopped at 02/23/22 1938    norepinephrine (LEVOPHED) 16 mg in sodium chloride 0.9 % 250 mL infusion (weight-based)  0.01-3.3 mcg/kg/min IntraVENous Continuous Mili Narayanan MD   Stopped at 02/22/22 2000    melatonin capsule 10 mg  10 mg Oral Nightly PRN Patience Tu, DO   10 mg at 02/22/22 2321         sodium chloride      sodium chloride      dextrose      sodium chloride 125 mL/hr at 02/23/22 2055    sodium chloride      norepinephrine Stopped (02/22/22 2000)        Objective:   BP (!) 96/57   Pulse 66   Temp 97.8 °F (36.6 °C) (Temporal)   Resp 29   Ht 6' 3\" (1.905 m)   Wt 291 lb 2 oz (132.1 kg)   SpO2 90%   BMI 36.39 kg/m²     General: no acute distress  HEENT: normocephalic, atraumatic  Neck: supple, symmetrical, trachea midline   Lungs: clear to auscultation bilaterally   Cardiovascular: s1 and s2 normal  Abdomen: soft, positive bowel sounds  Extremities: no edema or cyanosis   Neuro: aaox3, no focal deficits   Skin: normal color and texture     Recent Labs     02/22/22  1256 02/23/22  0149 02/24/22  0014   WBC 2.7* 22.5* 19.2*   RBC 5.21 4.33* 4.17*   HGB 13.2* 11.2* 10.6*   HCT 44.5 38.7* 36.2*   MCV 85.4 89.4 86.8   MCH 25.3* 25.9* 25.4*   MCHC 29.7* 28.9* 29.3*    174 148     Recent Labs     02/22/22  1256 02/22/22  1340 02/23/22  0149 02/23/22  0149 02/23/22  1823 02/24/22  0014     --  137  --   --  141  141   K 4.5   < > 6.3*   < > 6.3* 4.9  4.9   ANIONGAP 13  --  11  --   --  10  11     --  106  --   --  109  109   CO2 21*  --  20*  --   --  22  21*   BUN 36*  --  37*  --   --  43*  43*   CREATININE 3.1*  --  3.7*  --   --  2.7*  2.7*   GLUCOSE 90  --  122*  --   --  124*  129*   CALCIUM 8.8  --  8.0*  --   --  8.3*  8.7*    < > = values in this interval not displayed. Recent Labs     02/24/22  0014   MG 2.2     Recent Labs     02/22/22  1256 02/24/22  0014   AST 27 32   ALT 35 30   BILITOT 1.1 0.3   ALKPHOS 103 72     No results for input(s): PH, PO2, PCO2, HCO3, BE, O2SAT in the last 72 hours. Recent Labs     02/22/22  1256   [address] [facility] <0.01     Recent Labs     02/22/22  1256 02/23/22  0149 02/23/22  1155   INR 1.41* 1.40* 1.51*     No results for input(s): LACTA in the last 72 hours. Intake/Output Summary (Last 24 hours) at 2/24/2022 1207  Last data filed at 2/24/2022 1000  Gross per 24 hour   Intake 2996.54 ml   Output 1585 ml   Net 1411.54 ml       CT GUIDED NEEDLE PLACEMENT    Result Date: 2/23/2022  EXAMINATION:  CT GUIDED NEEDLE PLACEMENT  2/23/2022 3:38 PM HISTORY: The patient has a 4.5 cm infected cyst versus abscess arising from or adjacent to the lower pole right kidney. COMPARISON: CT on 2/22/2022. CONSENT: The CT guided drainage catheter placement procedure was discussed with the patient, including the risk of bleeding and infection. Questions were answered. The patient agreed to the procedure. SEDATION: Moderate conscious sedation performed for patient comfort. Michelle Sood M.D. supervised the procedure.  A dedicated nurse monitored the patient throughout the procedure including heart rate, blood pressure, respiratory rate and pulse oximetry. 1 mcg fentanyl and 50 mg Versed were administered intravenously. The moderate conscious sedation time was greater than 20 minutes. DLP: 8206 mGy-cm. Automated exposure control was utilized to decrease patient radiation dosage. PROCEDURE: The patient was placed on his left side and localization images were obtained with CT. The skin was marked at the level of the collection arising from or adjacent to the lower pole right kidney. Sterile gloves were utilized. The skin was prepped with ChloraPrep. A sterile drape was applied. Using CT guidance, 1% lidocaine was infiltrated into the soft tissues for local anesthesia. A 5 Polish 10 cm multihole catheter over needle was then advanced into the collection using CT guidance. When the collection was entered, about 2 mL of purulent fluid was withdrawn. The stylette was removed. A guidewire was advanced through the catheter. The catheter was removed over the guidewire. 6 Western Rachna and 8 Western Rachna dilators were then advanced over the wire and into the collection to dilate the tract. Subsequently, an 8 Polish multihole catheter was advanced over the wire and into the collection. Imaging was performed intermittently during this process. The stiffener and wire were removed from the catheter and the catheter loop was locked in place. A final set of images demonstrate the pigtail loop curled within the fluid collection. Approximately 8 additional cc of purulent fluid was then aspirated from the collection. This will be sent for culture. The patient tolerated the procedure well. He left the department in good condition and was verbal.    CT guidance was utilized during placement of an 8 Polish pigtail catheter into a fluid collection arising from or adjacent to the lower pole right kidney. The procedure was performed without difficulty or immediate complication, as above.  Signed by Dr Becka Murphy    Result Date: 2/22/2022  History: Hypoxia Nuclear calcification with ectasia of the aorta to maximum measurement of 3.2 cm. No pathologic lymphadenopathy identified. Atelectasis of the visible lung bases. No focal destructive osseous lesions. 1. There is a 5 mm stone within the right proximal to mid ureter positioned at the L4 level creating mild to moderate right-sided hydronephrosis. 2. There is an increasing size cystic lesion/collection within the inferior pole of the right kidney at the site of a prior 2.6 cm renal cyst now measuring 4.5 cm containing few air bubbles with adjacent perinephric and right psoas stranding concerning for an infected right renal cyst. 3. Hepatic steatosis. 4. Atherosclerotic changes of the aorta with ectasia to measurement of 3.2 cm Signed by Dr Laura Chapman    XR CHEST PORTABLE    Result Date: 2/22/2022  XR CHEST PORTABLE 2/22/2022 2:40 PM History: Short of breath. Confusion. Portable chest x-ray. No comparison. Heart size is within normal limits. The mediastinum has a normal appearance. The lungs are adequately expanded with no pneumonia or pneumothorax. There is mild chronic interstitial disease with scattered calcified granulomas. There is no significant pleural fluid. No congestive failure changes. 1. No acute disease.  Signed by Dr Jillian Brown and Plan:   Septic shock  Secondary to below  Agents per ID  Follow cultures  IVF  Norepinephrine gtt since weaned off  Lactate improved     Right obstructive pyelonephritis/abscess  Urology following  Right ureteral stone s/p stent placement  Right renal abscess s/p drain placement  History of ESBL E. Coli  Current urine culture ESBL E. coli  Agents per ID     GNR bacteremia  Suspect secondary to above  Agents per ID  Follow cultures     GUICHO  Secondary to above  IVF  Avoid offending agents  Follow renal function/urine output/electrolytes  Hyperkalemia resolved     DVT prophylaxis  Lovenox    Total critical care time: 44 minutes    Edmar Lawson MD   2/24/2022 12:07 PM

## 2022-02-24 NOTE — CONSULTS
**Physician Signature**  This document was electronically signed by: Cyndi Farrell MD  2022   10:25 PM    **Consult Cover Page**  FROM: Darren Gallegos 121, [phone]  Call Back Number: [phone]  SUBJECT: Consult Recommendations  Date and Time of Report: 2022 10:25 PM CT  Items Contained in this Document: Critical Care [facility]    **Consult Information**  Member Facility: [address] [place] [address] MRN: [id number]  Visit/Encounter Number: [id number]  Consult ID: [id number]  Facility Time Zone: CT  Date and Time of Request: 2022 09:27 PM  CT  Requesting Clinician: Christiane Gary MD  Patient Name: Lizette Resendiz  YOB: 1950  Gender: Male  Patient identity was confirmed at the beginning of the consult with the   patient/family/staff using two personal identifiers: Patient name and       **Reason for Consult**  Reason for Consult: [facility]    **Admission**  Admission Date: 2022  Chief reason for ICU admission: UTI, ureteral stent    **Core Metrics**  General orienting sentence for patient: 72 y/o M admitted with right flank   pain. S/p ureteral stent placed on  for 5cm ureteral stone on right. R   kidney cyst drained by IR. Pt has an indwelling catheter in placed. Meropeneum. +blood cultures. IVFs. K 6.3 yesterday. Redraw 6.2. On 3L O2 via   NC.   Chief physiologic deterioration: None - Stable patient  Is the patient on DVT prophylaxis?: Yes  Prophylaxis type: Mechanical  Is the patient on GI prophylaxis?: Not Indicated  Has this patient reached their nutritional goal?: Yes  Are there current issues with pain management in this patient?: Yes and   issues are being addressed  Pain management notes: Dilaudid for pain  Are there issues with skin integrity?: No  Are there issues with delirium?: No  Has the patient been mobilized?: Yes  Is this patient currently intubated?: No  Are there ethical or care philosophy or family issues?: No  Do you recommend an in depth evaluation?: No  Disposition Recommendations: Continue ICU level of care    **Inserted/Removed Devices**  Device Name: Reardon Catheter  Site of insertion: Urethra  Date of insertion: 02-    **Physician Signature**  This document was electronically signed by: Sky De La Paz MD  02/23/2022   10:25 PM

## 2022-02-24 NOTE — PROGRESS NOTES
Infectious Diseases Progress Note    Patient:  Jose Mancilla  YOB: 1950  MRN: [id number]   Admit date: 2/22/2022   Admitting Physician: Antonio Lyles MD  Primary Care Physician: Roosevelt Marrero MD    Chief Complaint/Interval History: He is sitting up at bedside. He is feeling better. He lives in [place]. He has a fairly short distance to the [facility] also fairly short distance to [address] [place] [address]. Explained I would likely be recommending 3 weeks of antibiotic treatment. Depending upon susceptibility testing, this may need to be intravenous. Suspect it might be a little bit difficult for him to go for daily IV antibiotic treatment for 3 weeks. Swing bed or temporary nursing home placement to complete IV antibiotic treatment might be a consideration. In/Out    Intake/Output Summary (Last 24 hours) at 2/24/2022 0836  Last data filed at 2/24/2022 0400  Gross per 24 hour   Intake 2540 ml   Output 1805 ml   Net 735 ml     Allergies: Allergies   Allergen Reactions    Codeine Other (See Comments)     Describes it as a sensitivity.        Current Meds: HYDROmorphone HCl PF (DILAUDID) injection 1 mg, Q3H PRN  0.9 % sodium chloride infusion, PRN  HYDROmorphone HCl PF (DILAUDID) injection 0.5 mg, Q3H PRN  glucagon (rDNA) injection 1 mg, PRN  dextrose 5 % solution, PRN  dextrose bolus (hypoglycemia) 10% 250 mL, PRN  dextrose bolus (hypoglycemia) 10% 125 mL, PRN   Or  dextrose bolus (hypoglycemia) 10% 250 mL, PRN  glucose chewable tablet 4 each, PRN  0.9 % sodium chloride infusion, Continuous  dilTIAZem (CARDIZEM CD) extended release capsule 120 mg, Daily  [Held by provider] metoprolol succinate (TOPROL XL) extended release tablet 25 mg, Daily  sodium chloride flush 0.9 % injection 5-40 mL, 2 times per day  sodium chloride flush 0.9 % injection 5-40 mL, PRN  0.9 % sodium chloride infusion, PRN  enoxaparin (LOVENOX) injection 40 mg, Daily  acetaminophen (TYLENOL) tablet 650 mg, Q6H PRN   Or  acetaminophen (TYLENOL) suppository 650 mg, Q6H PRN  meropenem (MERREM) 1000 mg in sodium chloride 0.9% 50 mL (duplex), Q24H  norepinephrine (LEVOPHED) 16 mg in sodium chloride 0.9 % 250 mL infusion (weight-based), Continuous  melatonin capsule 10 mg, Nightly PRN      Review of Systems no cardiopulmonary complaints    VitalSigns:  /77   Pulse 72   Temp 97 °F (36.1 °C) (Temporal)   Resp 17   Ht 6' 3\" (1.905 m)   Wt 291 lb 2 oz (132.1 kg)   SpO2 94%   BMI 36.39 kg/m²      Physical Exam  Line/IV site: No erythema, warmth, induration, or tenderness. Abdomen is soft and nontender  Minimal flank tenderness  Lungs clear to auscultation no crackles  Skin without rash    Lab Results:  CBC:   Recent Labs     02/22/22  1256 02/23/22  0149 02/24/22  0014   WBC 2.7* 22.5* 19.2*   HGB 13.2* 11.2* 10.6*    174 148     BMP:  Recent Labs     02/22/22  1256 02/22/22  1340 02/23/22  0149 02/23/22  0149 02/23/22  1823 02/24/22  0014     --  137  --   --  141  141   K 4.5   < > 6.3*   < > 6.3* 4.9  4.9     --  106  --   --  109  109   CO2 21*  --  20*  --   --  22  21*   BUN 36*  --  37*  --   --  43*  43*   CREATININE 3.1*  --  3.7*  --   --  2.7*  2.7*   GLUCOSE 90  --  122*  --   --  124*  129*    < > = values in this interval not displayed. CultureResults:  Culture from abscess drainage-gram-negative rods. ID and susceptibility pending.   Blood cultures February 22, 2022-gram-negative rods  Urine culture February 22, 2022-ESBL positive E. coli:  Escherichia coli (esbl)       BACTERIAL SUSCEPTIBILITY PANEL BY ARAMIS     ampicillin >=32 mcg/mL Resistant     ampicillin-sulbactam >=32 mcg/mL Resistant     aztreonam  Resistant     ceFAZolin >=64 mcg/mL Resistant     cefepime  Resistant     cefTRIAXone >=64 mcg/mL Resistant     ertapenem <=0.5 mcg/mL Sensitive     gentamicin <=1 mcg/mL Sensitive     levofloxacin >=8 mcg/mL Resistant     meropenem <=0.25 mcg/mL Sensitive nitrofurantoin <=16 mcg/mL Sensitive     trimethoprim-sulfamethoxazole >=320 mcg/mL Resistant      Radiology: None    Additional Studies Reviewed:  None    Impression:  1. Gram-negative bacteremia-final ID pending-suspect will be ESBL positive E. coli  2. Obstructive pyelonephritis with abscess  3.   Renal insufficiency    Recommendations:  Continue treatment with meropenem  Would recommend 3 weeks of intravenous antibiotic therapy based on susceptibility from urine culture  Would recommend midline catheter placement  I think he would have difficulty managing IV antibiotic treatment at home  Would recommend either outpatient IV antibiotic treatment through [facility] or possibly temporary nursing home placement or swing bed to complete his antibiotic treatment and receive physical therapy    Recommend the following antibiotic treatment:  Meropenem 1 g IV daily through March 16, 2022  Or  Ertapenem 500 mg IV daily through March 16, 2022  (The above antibiotic doses are based on current creatinine of 2.7 and may need to be adjusted if renal function changes)    Loli Greene MD

## 2022-02-25 ENCOUNTER — APPOINTMENT (OUTPATIENT)
Dept: CT IMAGING | Age: 72
DRG: 853 | End: 2022-02-25
Payer: MEDICARE

## 2022-02-25 LAB
ALBUMIN SERPL-MCNC: 2.4 G/DL (ref 3.5–5.2)
ALP BLD-CCNC: 64 U/L (ref 40–130)
ALT SERPL-CCNC: 43 U/L (ref 5–41)
ANION GAP SERPL CALCULATED.3IONS-SCNC: 11 MMOL/L (ref 7–19)
AST SERPL-CCNC: 39 U/L (ref 5–40)
BASOPHILS ABSOLUTE: 0 K/UL (ref 0–0.2)
BASOPHILS RELATIVE PERCENT: 0.1 % (ref 0–1)
BILIRUB SERPL-MCNC: 0.4 MG/DL (ref 0.2–1.2)
BUN BLDV-MCNC: 43 MG/DL (ref 8–23)
CALCIUM SERPL-MCNC: 8 MG/DL (ref 8.8–10.2)
CHLORIDE BLD-SCNC: 109 MMOL/L (ref 98–111)
CO2: 20 MMOL/L (ref 22–29)
CREAT SERPL-MCNC: 2.1 MG/DL (ref 0.5–1.2)
CULTURE, BLOOD 2: ABNORMAL
CULTURE, BLOOD 2: ABNORMAL
EOSINOPHILS ABSOLUTE: 0 K/UL (ref 0–0.6)
EOSINOPHILS RELATIVE PERCENT: 0.2 % (ref 0–5)
GFR AFRICAN AMERICAN: 38
GFR NON-AFRICAN AMERICAN: 31
GLUCOSE BLD-MCNC: 117 MG/DL (ref 74–109)
GLUCOSE BLD-MCNC: 88 MG/DL (ref 70–99)
HCT VFR BLD CALC: 37.5 % (ref 42–52)
HEMOGLOBIN: 11 G/DL (ref 14–18)
IMMATURE GRANULOCYTES #: 0.3 K/UL
LYMPHOCYTES ABSOLUTE: 0.8 K/UL (ref 1.1–4.5)
LYMPHOCYTES RELATIVE PERCENT: 6.2 % (ref 20–40)
MAGNESIUM: 1.9 MG/DL (ref 1.6–2.4)
MCH RBC QN AUTO: 25.1 PG (ref 27–31)
MCHC RBC AUTO-ENTMCNC: 29.3 G/DL (ref 33–37)
MCV RBC AUTO: 85.4 FL (ref 80–94)
MONOCYTES ABSOLUTE: 0.5 K/UL (ref 0–0.9)
MONOCYTES RELATIVE PERCENT: 4.2 % (ref 0–10)
NEUTROPHILS ABSOLUTE: 11.2 K/UL (ref 1.5–7.5)
NEUTROPHILS RELATIVE PERCENT: 87 % (ref 50–65)
ORGANISM: ABNORMAL
PDW BLD-RTO: 14.1 % (ref 11.5–14.5)
PERFORMED ON: NORMAL
PLATELET # BLD: 162 K/UL (ref 130–400)
PMV BLD AUTO: 11.9 FL (ref 9.4–12.4)
POTASSIUM SERPL-SCNC: 4.3 MMOL/L (ref 3.5–5)
RBC # BLD: 4.39 M/UL (ref 4.7–6.1)
SODIUM BLD-SCNC: 140 MMOL/L (ref 136–145)
TOTAL PROTEIN: 5.6 G/DL (ref 6.6–8.7)
WBC # BLD: 12.8 K/UL (ref 4.8–10.8)

## 2022-02-25 PROCEDURE — 85025 COMPLETE CBC W/AUTO DIFF WBC: CPT

## 2022-02-25 PROCEDURE — 99231 SBSQ HOSP IP/OBS SF/LOW 25: CPT | Performed by: NURSE PRACTITIONER

## 2022-02-25 PROCEDURE — 2580000003 HC RX 258: Performed by: UROLOGY

## 2022-02-25 PROCEDURE — 74176 CT ABD & PELVIS W/O CONTRAST: CPT

## 2022-02-25 PROCEDURE — 6360000002 HC RX W HCPCS: Performed by: UROLOGY

## 2022-02-25 PROCEDURE — 6370000000 HC RX 637 (ALT 250 FOR IP): Performed by: INTERNAL MEDICINE

## 2022-02-25 PROCEDURE — C1751 CATH, INF, PER/CENT/MIDLINE: HCPCS

## 2022-02-25 PROCEDURE — 80053 COMPREHEN METABOLIC PANEL: CPT

## 2022-02-25 PROCEDURE — 6370000000 HC RX 637 (ALT 250 FOR IP): Performed by: UROLOGY

## 2022-02-25 PROCEDURE — 36415 COLL VENOUS BLD VENIPUNCTURE: CPT

## 2022-02-25 PROCEDURE — 83735 ASSAY OF MAGNESIUM: CPT

## 2022-02-25 PROCEDURE — 05HF33Z INSERTION OF INFUSION DEVICE INTO LEFT CEPHALIC VEIN, PERCUTANEOUS APPROACH: ICD-10-PCS | Performed by: INTERNAL MEDICINE

## 2022-02-25 PROCEDURE — 2580000003 HC RX 258: Performed by: INTERNAL MEDICINE

## 2022-02-25 PROCEDURE — 82947 ASSAY GLUCOSE BLOOD QUANT: CPT

## 2022-02-25 PROCEDURE — 97162 PT EVAL MOD COMPLEX 30 MIN: CPT

## 2022-02-25 PROCEDURE — 36410 VNPNXR 3YR/> PHY/QHP DX/THER: CPT

## 2022-02-25 PROCEDURE — 76937 US GUIDE VASCULAR ACCESS: CPT

## 2022-02-25 PROCEDURE — 97116 GAIT TRAINING THERAPY: CPT

## 2022-02-25 PROCEDURE — 97530 THERAPEUTIC ACTIVITIES: CPT

## 2022-02-25 PROCEDURE — 1210000000 HC MED SURG R&B

## 2022-02-25 PROCEDURE — 97165 OT EVAL LOW COMPLEX 30 MIN: CPT

## 2022-02-25 RX ADMIN — Medication 10 MG: at 22:34

## 2022-02-25 RX ADMIN — DILTIAZEM HYDROCHLORIDE 120 MG: 120 CAPSULE, COATED, EXTENDED RELEASE ORAL at 07:45

## 2022-02-25 RX ADMIN — HYDROMORPHONE HYDROCHLORIDE 0.5 MG: 1 INJECTION, SOLUTION INTRAMUSCULAR; INTRAVENOUS; SUBCUTANEOUS at 11:04

## 2022-02-25 RX ADMIN — HYDROMORPHONE HYDROCHLORIDE 0.5 MG: 1 INJECTION, SOLUTION INTRAMUSCULAR; INTRAVENOUS; SUBCUTANEOUS at 22:35

## 2022-02-25 RX ADMIN — SODIUM CHLORIDE, PRESERVATIVE FREE 10 ML: 5 INJECTION INTRAVENOUS at 09:03

## 2022-02-25 RX ADMIN — MEROPENEM AND SODIUM CHLORIDE 1000 MG: 1 INJECTION, SOLUTION INTRAVENOUS at 17:58

## 2022-02-25 RX ADMIN — ENOXAPARIN SODIUM 40 MG: 100 INJECTION SUBCUTANEOUS at 17:57

## 2022-02-25 RX ADMIN — SODIUM CHLORIDE, PRESERVATIVE FREE 10 ML: 5 INJECTION INTRAVENOUS at 09:02

## 2022-02-25 RX ADMIN — HYDROMORPHONE HYDROCHLORIDE 0.5 MG: 1 INJECTION, SOLUTION INTRAMUSCULAR; INTRAVENOUS; SUBCUTANEOUS at 01:33

## 2022-02-25 ASSESSMENT — PAIN DESCRIPTION - FREQUENCY: FREQUENCY: CONTINUOUS

## 2022-02-25 ASSESSMENT — PAIN SCALES - GENERAL
PAINLEVEL_OUTOF10: 5
PAINLEVEL_OUTOF10: 2
PAINLEVEL_OUTOF10: 5

## 2022-02-25 ASSESSMENT — PAIN DESCRIPTION - DESCRIPTORS: DESCRIPTORS: ACHING

## 2022-02-25 ASSESSMENT — PAIN DESCRIPTION - ORIENTATION
ORIENTATION: LOWER
ORIENTATION: LOWER

## 2022-02-25 ASSESSMENT — ENCOUNTER SYMPTOMS
NAUSEA: 0
BACK PAIN: 0
ABDOMINAL DISTENTION: 0
ABDOMINAL PAIN: 0
VOMITING: 0

## 2022-02-25 ASSESSMENT — PAIN DESCRIPTION - PAIN TYPE
TYPE: ACUTE PAIN
TYPE: ACUTE PAIN

## 2022-02-25 ASSESSMENT — PAIN DESCRIPTION - LOCATION
LOCATION: BACK
LOCATION: BACK

## 2022-02-25 ASSESSMENT — PAIN DESCRIPTION - ONSET: ONSET: ON-GOING

## 2022-02-25 ASSESSMENT — PAIN SCALES - WONG BAKER: WONGBAKER_NUMERICALRESPONSE: 2

## 2022-02-25 ASSESSMENT — PAIN DESCRIPTION - PROGRESSION: CLINICAL_PROGRESSION: NOT CHANGED

## 2022-02-25 NOTE — PROGRESS NOTES
Physical Therapy    Facility/Department: [facility] SURG SERVICES  Initial Assessment    NAME: Ruy Gonzales  : 1950  MRN: [id number]    Date of Service: 2022    Discharge Recommendations:  Continue to assess pending progress,24 hour supervision or assist,Patient would benefit from continued therapy after discharge        Assessment   Body structures, Functions, Activity limitations: Decreased functional mobility ; Decreased strength;Decreased balance; Increased pain  Assessment: Pt. will benefit from cont. PT while in acute care. Anticipate pt will need 24 hr care upon d/c home. Would recommend pt especially use RW for longer amb. distances and gait belt and staff A for amb. in room. Treatment Diagnosis: impaired gait and mobility  Prognosis: Good  Decision Making: Medium Complexity  PT Education: Goals;PT Role;Plan of Care;Precautions;Gait Training;General Safety; Functional Mobility Training;Transfer Training  Patient Education: need for use of RW  Barriers to Learning: none noted  REQUIRES PT FOLLOW UP: Yes  Activity Tolerance  Activity Tolerance: Patient Tolerated treatment well;Patient limited by pain; Patient limited by fatigue       Patient Diagnosis(es): The primary encounter diagnosis was Septicemia ([facility] [address] Diagnoses of Hypoxia, Kidney stone, Acute pyelonephritis, and Infected kidney cyst were also pertinent to this visit. has a past medical history of ACS (acute coronary syndrome) ([facility] [address] CAD (coronary artery disease), Cerebral artery occlusion with cerebral infarction [facility]), Difficulty voiding, Hyperlipidemia, Hypertension, Kidney stone, MI (myocardial infarction) ([facility] [address] Renal calculus, right, and UTI (urinary tract infection). has a past surgical history that includes Cardiac catheterization; Colonoscopy; pr cysto/uretero w/lithotripsy &indwell stent insrt (Right, 2018); pr circumcision,other,28+ d/o (N/A, 2018); pr fragment kidney stone/ eswl (Right, 2018);  CYSTOSCOPY INSERTION / REMOVAL STENT / STONE (Right, 8/28/2018); Lithotripsy (Left, 1/6/2022); and Cystoscopy (Right, 2/22/2022). Restrictions  Restrictions/Precautions  Restrictions/Precautions: Fall Risk,Contact Precautions  Position Activity Restriction  Other position/activity restrictions: hx ESBL 12/21  Vision/Hearing  Vision: Within Functional Limits  Hearing: Within functional limits     Subjective  General  Chart Reviewed: Yes  Patient assessed for rehabilitation services?: Yes  Response To Previous Treatment: Not applicable  Family / Caregiver Present: No  Referring Practitioner: Robi Haney MD  Referral Date : 02/24/22  Diagnosis: Septicemia, Hypoxia, Kidney stone, Acute pyelonephritis, Infected kidney cyst  Follows Commands: Within Functional Limits  General Comment  Comments: 2/22 CYSTOSCOPY ,URETEROSCOPY WITH URETERAL STENT INSERTION  Subjective  Subjective: Pt. napping very soundly when PT entered room. Pt. willing to walk in the room. States his legs feel weak. States he is going to try to not use a RW, but reports being in bed in ICU a few days. C/o back pain. Pain Screening  Patient Currently in Pain: Yes  Pain Assessment  Pain Assessment: 0-10  Pain Level: 5  Pain Type: Acute pain  Pain Location: Back  Pain Orientation: Lower  Functional Pain Assessment: Prevents or interferes some active activities and ADLs  Non-Pharmaceutical Pain Intervention(s): Repositioned; Ambulation/Increased Activity  Vital Signs  Patient Currently in Pain: Yes  Pre Treatment Pain Screening  Intervention List: Patient able to continue with treatment  Comments / Details: already had pain meds, but then requests PT ask RN for a pain shot after PT    Orientation  Orientation  Overall Orientation Status: Within Functional Limits  Social/Functional History  Social/Functional History  Lives With: Spouse  Type of Home: House  Home Layout: One level  Home Access: Stairs to enter without rails  Entrance Stairs - Number of Steps: 2  ADL Assistance: Independent  Ambulation Assistance: Independent  Transfer Assistance: Independent  Additional Comments: pt thinks he might have a RW at home-states his wife will know when she gets here. Cognition   Cognition  Overall Cognitive Status: WFL    Objective     Observation/Palpation  Posture: Good  Observation: IV, dupree, urostomy bag    AROM RLE (degrees)  RLE AROM: WFL  AROM LLE (degrees)  LLE AROM : WFL  Strength RLE  Strength RLE: Exception  Comment: grossly 3+/5  Strength LLE  Strength LLE: Exception  Comment: grossly 3+/5        Bed mobility  Supine to Sit: Minimal assistance  Sit to Supine: Minimal assistance  Scooting: Minimal assistance  Transfers  Sit to Stand: Minimal Assistance  Stand to sit: Minimal Assistance  Ambulation  Ambulation?: Yes  Ambulation 1  Surface: level tile  Device: No Device  Assistance: Minimal assistance  Quality of Gait: pt holding onto IV pole for amb. Gait Deviations: Slow Lorie;Decreased step length;Decreased step height  Distance: 30'  Comments: c/o legs feeling weak     Balance  Posture: Good  Sitting - Static: Good;+  Sitting - Dynamic: Good;-  Standing - Static: Fair;-  Standing - Dynamic: Poor;+        Plan   Plan  Times per week: 3-7  Times per day: Daily  Plan weeks: 2  Current Treatment Recommendations: Strengthening,ROM,Balance Training,Functional Mobility Training,Transfer Training,Endurance Training,Gait Training,Safety Education & Training,Positioning,Equipment Evaluation, Education, & procurement,Patient/Caregiver Education & Training  Plan Comment: cont. PT per POC.   Safety Devices  Type of devices: Gait belt,Patient at risk for falls,Nurse notified,Left in bed,Call light within reach,Bed alarm in place (pt declined sitting up in chair at this time, requested a pain shot from RN per pt request)    G-Code       OutComes Score                                                  AM-PAC Score             Goals  Short term goals  Time Frame for Short term goals: 2 wks  Short term goal 1: supine to sit indep  Short term goal 2: sit to stand indep  Short term goal 3: amb. 200' with RW SBA  Patient Goals   Patient goals : go home soon       Therapy Time   Individual Concurrent Group Co-treatment   Time In           Time Out           Minutes                   Christiane Noe PT    Electronically signed by Christiane Noe PT on 2/25/2022 at 12:59 PM

## 2022-02-25 NOTE — PROGRESS NOTES
Urology Progress Note    SUBJECTIVE:  No new  complaints. Sitting up on side of bed. He states he feels much better now than when arriving. OBJECTIVE:   Review of Systems   Constitutional: Negative for chills and fever. Gastrointestinal: Negative for abdominal distention, abdominal pain, nausea and vomiting. Genitourinary: Negative for difficulty urinating, dysuria, flank pain, frequency, hematuria and urgency. Musculoskeletal: Negative for back pain and gait problem. Neurological: Positive for weakness. C/o leg weakness   Psychiatric/Behavioral: Negative for agitation and confusion. Physical  VITALS:  BP (!) 143/101   Pulse 71   Temp 96.8 °F (36 °C) (Temporal)   Resp 16   Ht 6' 3\" (1.905 m)   Wt 291 lb 2 oz (132.1 kg)   SpO2 92%   BMI 36.39 kg/m²   TEMPERATURE:  Current - Temp: 96.8 °F (36 °C); Max - Temp  Av.4 °F (36.3 °C)  Min: 96.6 °F (35.9 °C)  Max: 98.1 °F (36.7 °C)   24 HR I&O   Intake/Output Summary (Last 24 hours) at 2022 0739  Last data filed at 2022 0450  Gross per 24 hour   Intake 1773.79 ml   Output 2685 ml   Net -911.21 ml       Physical Exam   BACK: flank tenderness: right and right flank drain. Minimal drainage in bag   ABDOMEN:  soft, non-distended, rounded, and non-tender  HEART:  normal rate  CHEST:  Normal respiratory effort  GENITAL/URINARY:  Reardon catheter to bedside with pale yellow urine.  Right nephrostomy tube in place with minimal purulent drainage    Data  CBC:   Recent Labs     22  0149 22  0014 22  0211   WBC 22.5* 19.2* 12.8*   HGB 11.2* 10.6* 11.0*   HCT 38.7* 36.2* 37.5*    148 162     BMP:    Recent Labs     22  0149 22  1823 22  0014 22  0211     --  141  141 140   K 6.3*   < > 4.9  4.9 4.3     --  109  109 109   CO2 20*  --  22  21* 20*   BUN 37*  --  43*  43* 43*   CREATININE 3.7*  --  2.7*  2.7* 2.1*   GLUCOSE 122*  --  124*  129* 117*    < > = values in this interval not displayed. Recent Labs     02/22/22  1933   LABURIN 10,000 CFU/ml*  One colony  No further workup  Refer to Urine culture collected on 2/22/22 at 15:38 for sensitivity  results       Recent Labs     02/22/22 1818   BC No Growth to date. Any change in status will be called. Recent Labs     02/22/22 1818   BLOODCULT2 No Growth to date. Any change in status will be called. U/A:    Lab Results   Component Value Date    NITRITE neg 09/22/2021    COLORU YELLOW 02/22/2022    PHUR 5.5 02/22/2022    WBCUA TNTC 02/22/2022    RBCUA 11-15 02/22/2022    YEAST 0 12/08/2021    BACTERIA 3+ 02/22/2022    CLARITYU TURBID 02/22/2022    SPECGRAV 1.014 02/22/2022    LEUKOCYTESUR LARGE 02/22/2022    UROBILINOGEN 1.0 02/22/2022    BILIRUBINUR Negative 02/22/2022    BILIRUBINUR neg 09/22/2021    BLOODU MODERATE 02/22/2022    GLUCOSEU Negative 02/22/2022       Radiology:   CT GUIDED NEEDLE PLACEMENT    Result Date: 2/23/2022  CT guidance was utilized during placement of an 8 Maori pigtail catheter into a fluid collection arising from or adjacent to the lower pole right kidney. The procedure was performed without difficulty or immediate complication, as above. Signed by Dr Juan Miguel Bettencourt    Result Date: 2/22/2022  1. Very low probability of pulmonary emboli. Signed by Dr Obdulio Laird    Result Date: 2/22/2022  1. There is a 5 mm stone within the right proximal to mid ureter positioned at the L4 level creating mild to moderate right-sided hydronephrosis. 2. There is an increasing size cystic lesion/collection within the inferior pole of the right kidney at the site of a prior 2.6 cm renal cyst now measuring 4.5 cm containing few air bubbles with adjacent perinephric and right psoas stranding concerning for an infected right renal cyst. 3. Hepatic steatosis.  4. Atherosclerotic changes of the aorta with ectasia to measurement of 3.2 cm Signed by  Tarri Orf    XR CHEST PORTABLE    Result Date: 2/22/2022  1. No acute disease. Signed by Dr Soila Juares    Patient Active Problem List   Diagnosis    ACS [facility]acute [facility] syndrome) ([facility] [address]    Family history of early CAD    Essential hypertension    CAD (coronary artery disease)    Long term current use of anticoagulant therapy    Renal calculus, right    Phimosis    Right ureteral calculus    Postoperative pain    UTI due to extended-spectrum beta lactamase (ESBL) producing Escherichia coli    Renal calculus, left    Obstructive pyelonephritis    GUICHO (acute kidney injury) ([facility] [address]    Infected kidney cyst, right    Sepsis ([facility] [address]     1. Sepsis secondary to obstructive pyelonephritis from right ureteral calculus status post stent placement. He is clinically improving. Improving leukocytosis, afebrile overnight, overall improvement. Cultures positive for ESBL E coli. Currently on Merrem. Being followed by infectious disease, appreciate recommendations. 2. Infected right renal cyst status post percutaneous drainage. Minimal drainage overnight. Will repeat CT to ensure no un drained pockets of infection. 3. Obstructing right ureteral calculus status post stent placement. Will need to definitively treat stone once he has completed at least 3 weeks of culture specific antibiotics. 4. GUICHO. Renal function slowly improving. Good urine output.        SANTI Bailon - CNP   2/25/2022 7:39 AM

## 2022-02-25 NOTE — PROGRESS NOTES
Occupational Therapy Initial Assessment  Date: 2022   Patient Name: Marguerite Hannah  MRN: [id number]     : 1950    Date of Service: 2022    Discharge Recommendations:          Assessment   Performance deficits / Impairments: Decreased endurance;Decreased functional mobility ; Decreased ADL status; Decreased balance  Assessment: Will progress as tolerated  Treatment Diagnosis: Pyelonephritis  Prognosis: Good  Decision Making: Low Complexity     REQUIRES OT FOLLOW UP: Yes  Activity Tolerance  Activity Tolerance: Patient Tolerated treatment well           Patient Diagnosis(es): The primary encounter diagnosis was Septicemia ([facility] [address] Diagnoses of Hypoxia, Kidney stone, Acute pyelonephritis, and Infected kidney cyst were also pertinent to this visit. has a past medical history of ACS (acute coronary syndrome) ([facility] [address] CAD (coronary artery disease), Cerebral artery occlusion with cerebral infarction [facility]), Difficulty voiding, Hyperlipidemia, Hypertension, Kidney stone, MI (myocardial infarction) ([facility] [address] Renal calculus, right, and UTI (urinary tract infection). has a past surgical history that includes Cardiac catheterization; Colonoscopy; pr cysto/uretero w/lithotripsy &indwell stent insrt (Right, 2018); pr circumcision,other,28+ d/o (N/A, 2018); pr fragment kidney stone/ eswl (Right, 2018); CYSTOSCOPY INSERTION / REMOVAL STENT / STONE (Right, 2018); Lithotripsy (Left, 2022); and Cystoscopy (Right, 2022).     Treatment Diagnosis: Pyelonephritis      Restrictions  Restrictions/Precautions  Restrictions/Precautions: Fall Risk,Contact Precautions  Position Activity Restriction  Other position/activity restrictions: hx ESBL     Subjective   General  Chart Reviewed: Yes  Patient assessed for rehabilitation services?: Yes  Family / Caregiver Present: No  Diagnosis: Pyelonephritis  Patient Currently in Pain: Yes  Pain Assessment  Pain Assessment: 0-10  Pain Level: 5  Pain Type: Acute pain  Pain Location: Back  Pain Orientation: Lower  Functional Pain Assessment: Prevents or interferes some active activities and ADLs  Non-Pharmaceutical Pain Intervention(s): Repositioned; Ambulation/Increased Activity  Vital Signs  Patient Currently in Pain: Yes    Social/Functional History  Social/Functional History  Lives With: Spouse  Type of Home: House  Home Layout: One level  Home Access: Stairs to enter without rails  Entrance Stairs - Number of Steps: 2  ADL Assistance: Independent  Ambulation Assistance: Independent  Transfer Assistance: Independent  Additional Comments: pt thinks he might have a RW at home-states his wife will know when she gets here.        Objective        Orientation  Overall Orientation Status: Within Normal Limits  Observation/Palpation  Posture: Good  Observation: IV, dupree, urostomy bag     ADL  Feeding: Independent  Grooming: Independent  UE Bathing: Supervision  LE Bathing: Minimal assistance  UE Dressing: Supervision  LE Dressing: Minimal assistance  Toileting: Contact guard assistance  Additional Comments: Spouse can assist with LB ADLs if needed           Transfers  Stand Step Transfers: Contact guard assistance  Sit to stand: Contact guard assistance     Cognition  Overall Cognitive Status: WFL                 LUE AROM (degrees)  LUE AROM : WFL  RUE AROM (degrees)  RUE AROM : WFL  LUE Strength  Gross LUE Strength: WFL  RUE Strength  Gross RUE Strength: WFL                 Plan   Plan  Times per week: 3-5x/week  Times per day: Daily    Goals  Short term goals  Time Frame for Short term goals: 1 week  Short term goal 1: Modified I with toilet tfers  Short term goal 2: Modified I with clothign mgmt and toilet hygiene  Short term goal 3: Supervision with seated LB dsg aspects  Long term goals  Long term goal 1: Return to [facility]             Documentation per Grace Isaac OT, note generated by  Ranulfo Crowley OT/L  Electronically signed by Ranulfo Crowley OT/MARCELA on 2/25/2022 at 1:08 PM.

## 2022-02-25 NOTE — PROGRESS NOTES
Hospitalist Progress Note  [facility]     Patient: Dao Lewis  : 1950  MRN: [id number]  Code Status: Full Code    Hospital Day: 3   Date of Service: 2022    Subjective:   Patient seen and examined. No current complaints. Past Medical History:   Diagnosis Date    ACS (acute coronary syndrome) ([facility] 2016  ACS  BRANDO RISK Score 1 (angina), AUC indication 3, AUC score 7 16  Cath  99% 1st diagonal, 2.25 x 15 YURIDIA, anterior lateral hypokinesis, EF 45%     CAD (coronary artery disease)     has seen dr. Geronimo Mai in the past    Cerebral artery occlusion with cerebral infarction ([facility]     Difficulty voiding     per pt c/o.     Hyperlipidemia     Hypertension 2016    Kidney stone     MI (myocardial infarction) ([facility]     stent    Renal calculus, right 2018    UTI (urinary tract infection)     due to stones; pt has been taking iv antibiotics at home; 22 last dose       Past Surgical History:   Procedure Laterality Date    CARDIAC CATHETERIZATION      COLONOSCOPY      CYSTOSCOPY Right 2022    CYSTOSCOPY ,URETEROSCOPY WITH URETERAL STENT INSERTION performed by Amanda Templeton MD at [facility] Right 2018    CYSTOSCOPY STENT REMOVAL performed by Amanda Templeton MD at [facility] LITHOTRIPSY Left 2022    LEFT RENAL EXTRACORPOREAL SHOCK WAVE LITHOTRIPSY performed by Amanda Templeton MD at [facility] CIRCUMCISION,OTHER,28+ D/O N/A 2018    DORSAL SLIT performed by Amanda Templeton MD at [address] &INDWELL STENT INSRT Right 2018    CYSTOSCOPY URETEROSCOPY LASER LITHOTRIPSY performed by Amanda Templeton MD at [facility] ESWL Right 2018    ESWL [address] LITHOTRIPSY performed by Amanda Templeton MD at [facility] OR       Family History   Problem Relation Age of Onset    Cancer Sister     Cancer Brother  Heart Disease Father        Social History     Socioeconomic History    Marital status:      Spouse name: jessica gunn    Number of children: 2    Years of education: Not on file    Highest education level: Not on file   Occupational History    Occupation: farmer    Tobacco Use    Smoking status: Former Smoker     Packs/day: 3.00     Years: 35.00     Pack years: 105.00    Smokeless tobacco: Former User     Quit date: 4/27/2001   Vaping Use    Vaping Use: Never used   Substance and Sexual Activity    Alcohol use: No    Drug use: No    Sexual activity: Yes     Partners: Female   Other Topics Concern    Not on file   Social History Narrative    Not on file     Social Determinants of Health     Financial Resource Strain:     Difficulty of Paying Living Expenses: Not on file   Food Insecurity:     Worried About [address] in the Last Year: Not on file    Lilian of Food in the Last Year: Not on file   Transportation Needs:     Lack of Transportation (Medical): Not on file    Lack of Transportation (Non-Medical):  Not on file   Physical Activity:     Days of Exercise per Week: Not on file    Minutes of Exercise per Session: Not on file   Stress:     Feeling of Stress : Not on file   Social Connections:     Frequency of Communication with Friends and Family: Not on file    Frequency of Social Gatherings with Friends and Family: Not on file    Attends [employer] Services: Not on file    Active Member of Clubs or Organizations: Not on file    Attends Club or Organization Meetings: Not on file    Marital Status: Not on file   Intimate Partner Violence:     Fear of Current or Ex-Partner: Not on file    Emotionally Abused: Not on file    Physically Abused: Not on file    Sexually Abused: Not on file   Housing Stability:     Unable to Pay for Housing in the Last Year: Not on file    Number of [place] in the Last Year: Not on file    Unstable Housing in the Last Year: Not on file       Current Facility-Administered Medications   Medication Dose Route Frequency Provider Last Rate Last Admin    lidocaine PF 1 % injection 5 mL  5 mL IntraDERmal Once Carey Howard MD        sodium chloride flush 0.9 % injection 5-40 mL  5-40 mL IntraVENous 2 times per day Carey Howard MD   10 mL at 02/25/22 0903    sodium chloride flush 0.9 % injection 5-40 mL  5-40 mL IntraVENous PRN Carey Howard MD        0.9 % sodium chloride infusion  25 mL IntraVENous PRN Carey Howard MD        melatonin capsule 10 mg  10 mg Oral Nightly Carey Howard MD   10 mg at 02/24/22 2121    lactated ringers infusion   IntraVENous Continuous Carey Howard  mL/hr at 02/24/22 1702 New Bag at 02/24/22 1702    0.9 % sodium chloride infusion   IntraVENous PRN Obdulio Bolaños MD        HYDROmorphone HCl PF (DILAUDID) injection 0.5 mg  0.5 mg IntraVENous Q3H PRN Obdulio Bolaños MD   0.5 mg at 02/25/22 0133    glucagon (rDNA) injection 1 mg  1 mg IntraMUSCular PRN Yael Sender, APRN - CNP        dextrose 5 % solution  100 mL/hr IntraVENous PRN Yael Sender, APRN - CNP        dextrose bolus (hypoglycemia) 10% 250 mL  250 mL IntraVENous PRN Yael Sender, APRN - CNP        dextrose bolus (hypoglycemia) 10% 125 mL  125 mL IntraVENous PRN Yael Sender, APRN - CNP        Or    dextrose bolus (hypoglycemia) 10% 250 mL  250 mL IntraVENous PRN Yael Sender, APRN - CNP   Stopped at 02/23/22 2328    glucose chewable tablet 4 each  4 tablet Oral PRN Yael Sender, APRN - CNP        dilTIAZem (CARDIZEM CD) extended release capsule 120 mg  120 mg Oral Daily Obdulio Bolaños MD   120 mg at 02/25/22 0745    [Held by provider] metoprolol succinate (TOPROL XL) extended release tablet 25 mg  25 mg Oral Daily Obdulio Bolaños MD        sodium chloride flush 0.9 % injection 5-40 mL  5-40 mL IntraVENous 2 times per day Obdulio Bolaños MD   10 mL at 02/25/22 0902    sodium chloride flush 0.9 % injection 5-40 mL  5-40 mL IntraVENous PRN Jada Callahan MD        0.9 % sodium chloride infusion  25 mL IntraVENous PRN Jada Callahan MD        enoxaparin (LOVENOX) injection 40 mg  40 mg SubCUTAneous Daily Jada Callahan MD   40 mg at 02/24/22 1745    acetaminophen (TYLENOL) tablet 650 mg  650 mg Oral Q6H PRN Jada Callahan MD   650 mg at 02/22/22 1910    Or    acetaminophen (TYLENOL) suppository 650 mg  650 mg Rectal Q6H PRN Jada Callahan MD        meropenem [facility]) 1000 mg in sodium chloride 0.9% 50 mL (duplex)  1,000 mg IntraVENous Q24H Jada Callahan MD   Stopped at 02/24/22 1833    melatonin capsule 10 mg  10 mg Oral Nightly PRN Patience Tu, DO   10 mg at 02/22/22 2321         sodium chloride      lactated ringers 100 mL/hr at 02/24/22 1702    sodium chloride      dextrose      sodium chloride          Objective:   BP (!) 143/101   Pulse 71   Temp 96.8 °F (36 °C) (Temporal)   Resp 16   Ht 6' 3\" (1.905 m)   Wt 291 lb 2 oz (132.1 kg)   SpO2 92%   BMI 36.39 kg/m²     General: no acute distress  HEENT: normocephalic, atraumatic  Neck: supple, symmetrical, trachea midline   Lungs: clear to auscultation bilaterally   Cardiovascular: s1 and s2 normal  Abdomen: soft, positive bowel sounds  Extremities: no edema or cyanosis   Neuro: aaox3, no focal deficits   Skin: normal color and texture     Recent Labs     02/23/22  0149 02/24/22  0014 02/25/22  0211   WBC 22.5* 19.2* 12.8*   RBC 4.33* 4.17* 4.39*   HGB 11.2* 10.6* 11.0*   HCT 38.7* 36.2* 37.5*   MCV 89.4 86.8 85.4   MCH 25.9* 25.4* 25.1*   MCHC 28.9* 29.3* 29.3*    148 162     Recent Labs     02/23/22  0149 02/23/22  1823 02/24/22  0014 02/25/22  0211     --  141  141 140   K 6.3*   < > 4.9  4.9 4.3   ANIONGAP 11  --  10  11 11     --  109  109 109   CO2 20*  --  22  21* 20*   BUN 37*  --  43*  43* 43*   CREATININE 3.7*  --  2.7*  2.7* 2.1*   GLUCOSE 122*  --  124*  129* 117*   CALCIUM 8.0*  --  8.3*  8.7* 8.0*    < > = values in this interval not displayed. Recent Labs     02/24/22  0014 02/25/22  0211   MG 2.2 1.9     Recent Labs     02/22/22  1256 02/24/22  0014 02/25/22  0211   AST 27 32 39   ALT 35 30 43*   BILITOT 1.1 0.3 0.4   ALKPHOS 103 72 64     No results for input(s): PH, PO2, PCO2, HCO3, BE, O2SAT in the last 72 hours. Recent Labs     02/22/22  1256   [address] <0.01     Recent Labs     02/22/22  1256 02/23/22  0149 02/23/22  1155   INR 1.41* 1.40* 1.51*     No results for input(s): LACTA in the last 72 hours. Intake/Output Summary (Last 24 hours) at 2/25/2022 1033  Last data filed at 2/25/2022 1004  Gross per 24 hour   Intake 1137.25 ml   Output 3485 ml   Net -2347.75 ml       CT GUIDED NEEDLE PLACEMENT    Result Date: 2/23/2022  EXAMINATION:  CT GUIDED NEEDLE PLACEMENT  2/23/2022 3:38 PM HISTORY: The patient has a 4.5 cm infected cyst versus abscess arising from or adjacent to the lower pole right kidney. COMPARISON: CT on 2/22/2022. CONSENT: The CT guided drainage catheter placement procedure was discussed with the patient, including the risk of bleeding and infection. Questions were answered. The patient agreed to the procedure. SEDATION: Moderate conscious sedation performed for patient comfort. Juliana Raygoza M.D. supervised the procedure. A dedicated nurse monitored the patient throughout the procedure including heart rate, blood pressure, respiratory rate and pulse oximetry. 1 mcg fentanyl and 50 mg Versed were administered intravenously. The moderate conscious sedation time was greater than 20 minutes. DLP: 3372 mGy-cm. Automated exposure control was utilized to decrease patient radiation dosage. PROCEDURE: The patient was placed on his left side and localization images were obtained with CT. The skin was marked at the level of the collection arising from or adjacent to the lower pole right kidney. Sterile gloves were utilized.  The skin was prepped with ChloraPrep. A sterile drape was applied. Using CT guidance, 1% lidocaine was infiltrated into the soft tissues for local anesthesia. A 5 Malian 10 cm multihole catheter over needle was then advanced into the collection using CT guidance. When the collection was entered, about 2 mL of purulent fluid was withdrawn. The stylette was removed. A guidewire was advanced through the catheter. The catheter was removed over the guidewire. 6 Western Rachna and 8 Western Rachna dilators were then advanced over the wire and into the collection to dilate the tract. Subsequently, an 8 Malian multihole catheter was advanced over the wire and into the collection. Imaging was performed intermittently during this process. The stiffener and wire were removed from the catheter and the catheter loop was locked in place. A final set of images demonstrate the pigtail loop curled within the fluid collection. Approximately 8 additional cc of purulent fluid was then aspirated from the collection. This will be sent for culture. The patient tolerated the procedure well. He left the department in good condition and was verbal.    CT guidance was utilized during placement of an 8 Malian pigtail catheter into a fluid collection arising from or adjacent to the lower pole right kidney. The procedure was performed without difficulty or immediate complication, as above.  Signed by Dr Radha Briggs and Plan:   Septic shock  Secondary to below  Agents per ID  Follow cultures  IVF  Norepinephrine gtt since weaned off  Lactate improved     Right obstructive pyelonephritis/abscess  Urology following  Right ureteral stone s/p stent placement  Right renal abscess s/p drain placement  History of ESBL E. Coli  Current urine culture ESBL E. coli  Agents per ID     ESBL E. coli bacteremia  Secondary to above  Agents per ID     GUICHO  Secondary to above  Creatinine continues to improve  IVF  Avoid offending agents  Follow renal function/urine output/electrolytes     DVT prophylaxis  Lovenox    Trisha Davies MD   2/25/2022 10:33 AM

## 2022-02-25 NOTE — CARE COORDINATION
CM attempted to reach spouse by phone for assessment.  CM unable to leave a message  Electronically signed by Carmen Harrison RN on 2/25/2022 at 4:09 PM

## 2022-02-25 NOTE — PROCEDURES
[facility] Vascular Access Team:  Midline Insertion Procedure Note    Marilin Weems  Admitted - 2/22/2022 12:49 PM  Admission Diagnosis -   Kidney stone [N20.0]  Septicemia ([address] [A41.9]  Acute pyelonephritis [N10]  Hypoxia [R09.02]  Obstructive uropathy [N13.9]  Infected kidney cyst [N28.1]    Attending Physician - Andrzej Hooper MD  Ordering Physician - Andrzej Hooper MD    PROCEDURE: Insertion of 4.5 Namib[place] Single Lumen Power Midline    INDICATION(S): Home IV therapy    PROCEDURE DETAILS:  Informed consent was obtained for the procedure, including sedation. Risks of hemorrhage and adverse drug reaction were discussed. 4.5 Namib[place] Single Lumen Power Midline inserted to the Left Cephalic per hospital protocol. Blood return: yes    FINDINGS:  The Left Cephalic vein was visualized using the ultrasound and deemed a suitable vessel. The area was prepped with Chlorhexidine and draped in sterile fashion using full max barrier draping. The area was anesthetized with 3 cc's of 1% Lidocaine. The vein was accessed using US guidance. The guidewire was advanced through the needle to secure the vein. The needle was removed and a peel-a-way Sheath was placed over the guidewire. Guidewire was removed with tip intact. The 4.5 [place]ib[place] Single Lumen Power Midline was left untrimmed at 15 cm and inserted into the Sheath. The peel-a-way sheath was removed. Approximately 1 cm exposed. All lumens had brisk blood return and was flushed with 10 cc of NS. The Midline was secured using a securement device and a Biopatch was placed over the insertion site. A Tegaderm was placed over the securement device and insertion site. Dressing was dated and initialed with external measurement marked. Patient did tolerate procedure well. IMPRESSION/PLAN:  1. Successful insertion of 4.5 [place]ib[place] Single Lumen Power Midline  3. Midline is ready to be used. Please change tubing prior to using the new Midline.  Make sure to label, date and use alcohol caps on new tubing and alcohol caps on unused ports.        Electronically Signed By: Iesha Briceño RN on 2/25/2022 at 3:49 PM

## 2022-02-25 NOTE — PROGRESS NOTES
Urology Progress Note      SUBJECTIVE: Patient says he feels a lot better breathing much better    OBJECTIVE: Patient has had no fever he is transferred out of ICU. REVIEW OF SYSTEMS:   Review of Systems      Physical  VITALS:  BP (!) 165/97   Pulse 74   Temp 97.7 °F (36.5 °C) (Temporal)   Resp 16   Ht 6' 3\" (1.905 m)   Wt 291 lb 2 oz (132.1 kg)   SpO2 90%   BMI 36.39 kg/m²   TEMPERATURE:  Current - Temp: 97.7 °F (36.5 °C); Max - Temp  Av.7 °F (36.5 °C)  Min: 97 °F (36.1 °C)  Max: 98.1 °F (36.7 °C)   24 HR I&O   Intake/Output Summary (Last 24 hours) at 2022 1806  Last data filed at 2022 1600  Gross per 24 hour   Intake 2376.54 ml   Output 1865 ml   Net 511.54 ml     Since initial drain placement there is been minimal purulent drainage from the right renal cyst.      BACK: flank tenderness: right and right flank drain. Minimal drainage in bag  ABDOMEN:  soft, non-distended and non-tender  HEART:  normal rate, regular rhythm,   CHEST: Normal respiratory effort no distress. GENITAL/URINARY: Normal Reardon catheter and clear urine    Data       CBC:   Recent Labs     22  1256 22  0149 22  0014   WBC 2.7* 22.5* 19.2*   HGB 13.2* 11.2* 10.6*   HCT 44.5 38.7* 36.2*    174 148     BMP:    Recent Labs     22  1256 22  1340 22  0149 22  0149 22  1823 22  0014     --  137  --   --  141  141   K 4.5   < > 6.3*   < > 6.3* 4.9  4.9     --  106  --   --  109  109   CO2 21*  --  20*  --   --  22  21*   BUN 36*  --  37*  --   --  43*  43*   CREATININE 3.1*  --  3.7*  --   --  2.7*  2.7*   GLUCOSE 90  --  122*  --   --  124*  129*    < > = values in this interval not displayed. Recent Labs     22  1933   LABURIN 10,000 CFU/ml*  One colony  No further workup  Refer to Urine culture collected on 22 at 15:38 for sensitivity  results       Recent Labs     22  1818   BC No Growth to date.   Any change in status will be called. Recent Labs     02/22/22  1818   BLOODCULT2 No Growth to date. Any change in status will be called. Cultures growing positive blood urine for E. coli cyst fluid positive for E. coli ESBL positive      ASSESSMENT AND PLAN    Patient Active Problem List   Diagnosis    [facility]    Family history of early CAD    Essential hypertension    CAD (coronary artery disease)    Long term current use of anticoagulant therapy    Renal calculus, right    Phimosis    Right ureteral calculus    Postoperative pain    UTI due to extended-spectrum beta lactamase (ESBL) producing Escherichia coli    Renal calculus, left    Obstructive pyelonephritis    GUICHO (acute kidney injury) ([facility]    Infected kidney cyst, right    Sepsis ([facility]       1. Sepsis secondary to obstructive pyelonephritis from right ureteral calculus he status post stent placement. He is clinically improving. Leukocytosis is improving he has had no fever and overall he feels better. His cultures are positive for ESBL positive E. coli. He is currently on Merrem. Appreciate infectious disease recommendations. 2.  Infected right renal cyst status post percutaneous drainage. He is having minimal drainage at this point. Need to repeat imaging to see if there is any undrained pockets. If this appears to be drained and there is no undrained collections consider drain removal.    3.  Obstructing right ureteral calculus status post stent placement. Will need to definitively treat the stone once he has completed at least 3 weeks of culture specific antibiotics. 4.  GUICHO.   Urine output okay creatinine slowly improving    Gregory Haskins MD

## 2022-02-26 LAB
ALBUMIN SERPL-MCNC: 2.7 G/DL (ref 3.5–5.2)
ALP BLD-CCNC: 67 U/L (ref 40–130)
ALT SERPL-CCNC: 39 U/L (ref 5–41)
ANION GAP SERPL CALCULATED.3IONS-SCNC: 11 MMOL/L (ref 7–19)
AST SERPL-CCNC: 27 U/L (ref 5–40)
BASOPHILS ABSOLUTE: 0 K/UL (ref 0–0.2)
BASOPHILS RELATIVE PERCENT: 0.4 % (ref 0–1)
BILIRUB SERPL-MCNC: 0.3 MG/DL (ref 0.2–1.2)
BUN BLDV-MCNC: 37 MG/DL (ref 8–23)
CALCIUM SERPL-MCNC: 8.6 MG/DL (ref 8.8–10.2)
CHLORIDE BLD-SCNC: 110 MMOL/L (ref 98–111)
CO2: 23 MMOL/L (ref 22–29)
CREAT SERPL-MCNC: 1.9 MG/DL (ref 0.5–1.2)
EOSINOPHILS ABSOLUTE: 0.1 K/UL (ref 0–0.6)
EOSINOPHILS RELATIVE PERCENT: 1.3 % (ref 0–5)
GFR AFRICAN AMERICAN: 43
GFR NON-AFRICAN AMERICAN: 35
GLUCOSE BLD-MCNC: 99 MG/DL (ref 74–109)
HCT VFR BLD CALC: 39.9 % (ref 42–52)
HEMOGLOBIN: 11.9 G/DL (ref 14–18)
IMMATURE GRANULOCYTES #: 0.3 K/UL
LYMPHOCYTES ABSOLUTE: 0.9 K/UL (ref 1.1–4.5)
LYMPHOCYTES RELATIVE PERCENT: 12.6 % (ref 20–40)
MAGNESIUM: 1.7 MG/DL (ref 1.6–2.4)
MCH RBC QN AUTO: 25.1 PG (ref 27–31)
MCHC RBC AUTO-ENTMCNC: 29.8 G/DL (ref 33–37)
MCV RBC AUTO: 84.2 FL (ref 80–94)
MONOCYTES ABSOLUTE: 0.5 K/UL (ref 0–0.9)
MONOCYTES RELATIVE PERCENT: 6.5 % (ref 0–10)
NEUTROPHILS ABSOLUTE: 5.3 K/UL (ref 1.5–7.5)
NEUTROPHILS RELATIVE PERCENT: 75.4 % (ref 50–65)
PDW BLD-RTO: 13.9 % (ref 11.5–14.5)
PLATELET # BLD: 173 K/UL (ref 130–400)
PMV BLD AUTO: 12.2 FL (ref 9.4–12.4)
POTASSIUM SERPL-SCNC: 4.7 MMOL/L (ref 3.5–5)
RBC # BLD: 4.74 M/UL (ref 4.7–6.1)
SODIUM BLD-SCNC: 144 MMOL/L (ref 136–145)
TOTAL PROTEIN: 6 G/DL (ref 6.6–8.7)
WBC # BLD: 7 K/UL (ref 4.8–10.8)

## 2022-02-26 PROCEDURE — 1210000000 HC MED SURG R&B

## 2022-02-26 PROCEDURE — 6360000002 HC RX W HCPCS: Performed by: UROLOGY

## 2022-02-26 PROCEDURE — 6370000000 HC RX 637 (ALT 250 FOR IP): Performed by: INTERNAL MEDICINE

## 2022-02-26 PROCEDURE — 99231 SBSQ HOSP IP/OBS SF/LOW 25: CPT | Performed by: STUDENT IN AN ORGANIZED HEALTH CARE EDUCATION/TRAINING PROGRAM

## 2022-02-26 PROCEDURE — 6370000000 HC RX 637 (ALT 250 FOR IP): Performed by: UROLOGY

## 2022-02-26 PROCEDURE — 83735 ASSAY OF MAGNESIUM: CPT

## 2022-02-26 PROCEDURE — 85025 COMPLETE CBC W/AUTO DIFF WBC: CPT

## 2022-02-26 PROCEDURE — 80053 COMPREHEN METABOLIC PANEL: CPT

## 2022-02-26 PROCEDURE — 2580000003 HC RX 258: Performed by: INTERNAL MEDICINE

## 2022-02-26 PROCEDURE — 36415 COLL VENOUS BLD VENIPUNCTURE: CPT

## 2022-02-26 RX ORDER — DOCUSATE SODIUM 100 MG/1
100 CAPSULE, LIQUID FILLED ORAL 2 TIMES DAILY
Status: DISCONTINUED | OUTPATIENT
Start: 2022-02-26 | End: 2022-02-28 | Stop reason: HOSPADM

## 2022-02-26 RX ORDER — SENNA PLUS 8.6 MG/1
1 TABLET ORAL NIGHTLY
Status: DISCONTINUED | OUTPATIENT
Start: 2022-02-26 | End: 2022-02-28 | Stop reason: HOSPADM

## 2022-02-26 RX ADMIN — HYDROMORPHONE HYDROCHLORIDE 0.5 MG: 1 INJECTION, SOLUTION INTRAMUSCULAR; INTRAVENOUS; SUBCUTANEOUS at 14:58

## 2022-02-26 RX ADMIN — MEROPENEM AND SODIUM CHLORIDE 1000 MG: 1 INJECTION, SOLUTION INTRAVENOUS at 17:35

## 2022-02-26 RX ADMIN — SODIUM CHLORIDE, PRESERVATIVE FREE 10 ML: 5 INJECTION INTRAVENOUS at 21:13

## 2022-02-26 RX ADMIN — HYDROMORPHONE HYDROCHLORIDE 0.5 MG: 1 INJECTION, SOLUTION INTRAMUSCULAR; INTRAVENOUS; SUBCUTANEOUS at 03:00

## 2022-02-26 RX ADMIN — DOCUSATE SODIUM 100 MG: 100 CAPSULE, LIQUID FILLED ORAL at 20:59

## 2022-02-26 RX ADMIN — DILTIAZEM HYDROCHLORIDE 120 MG: 120 CAPSULE, COATED, EXTENDED RELEASE ORAL at 09:21

## 2022-02-26 RX ADMIN — Medication 10 MG: at 20:59

## 2022-02-26 RX ADMIN — SENNOSIDES 8.6 MG: 8.6 TABLET, COATED ORAL at 18:11

## 2022-02-26 RX ADMIN — METOPROLOL SUCCINATE 25 MG: 25 TABLET, FILM COATED, EXTENDED RELEASE ORAL at 09:21

## 2022-02-26 RX ADMIN — HYDROMORPHONE HYDROCHLORIDE 0.5 MG: 1 INJECTION, SOLUTION INTRAMUSCULAR; INTRAVENOUS; SUBCUTANEOUS at 21:13

## 2022-02-26 RX ADMIN — ENOXAPARIN SODIUM 40 MG: 100 INJECTION SUBCUTANEOUS at 17:34

## 2022-02-26 RX ADMIN — HYDROMORPHONE HYDROCHLORIDE 0.5 MG: 1 INJECTION, SOLUTION INTRAMUSCULAR; INTRAVENOUS; SUBCUTANEOUS at 09:29

## 2022-02-26 ASSESSMENT — PAIN SCALES - GENERAL
PAINLEVEL_OUTOF10: 4
PAINLEVEL_OUTOF10: 3
PAINLEVEL_OUTOF10: 5
PAINLEVEL_OUTOF10: 6
PAINLEVEL_OUTOF10: 0
PAINLEVEL_OUTOF10: 7

## 2022-02-26 ASSESSMENT — PAIN DESCRIPTION - FREQUENCY
FREQUENCY: INTERMITTENT
FREQUENCY: INTERMITTENT

## 2022-02-26 ASSESSMENT — PAIN DESCRIPTION - DIRECTION: RADIATING_TOWARDS: BACK

## 2022-02-26 ASSESSMENT — PAIN DESCRIPTION - PROGRESSION: CLINICAL_PROGRESSION: NOT CHANGED

## 2022-02-26 ASSESSMENT — PAIN DESCRIPTION - LOCATION
LOCATION: BACK

## 2022-02-26 ASSESSMENT — PAIN DESCRIPTION - ORIENTATION
ORIENTATION: LOWER
ORIENTATION: LOWER

## 2022-02-26 ASSESSMENT — PAIN DESCRIPTION - ONSET
ONSET: ON-GOING
ONSET: ON-GOING

## 2022-02-26 ASSESSMENT — PAIN DESCRIPTION - PAIN TYPE
TYPE: ACUTE PAIN;SURGICAL PAIN
TYPE: SURGICAL PAIN
TYPE: ACUTE PAIN

## 2022-02-26 ASSESSMENT — PAIN - FUNCTIONAL ASSESSMENT: PAIN_FUNCTIONAL_ASSESSMENT: PREVENTS OR INTERFERES SOME ACTIVE ACTIVITIES AND ADLS

## 2022-02-26 ASSESSMENT — PAIN DESCRIPTION - DESCRIPTORS
DESCRIPTORS: ACHING;DISCOMFORT
DESCRIPTORS: ACHING

## 2022-02-26 NOTE — CARE COORDINATION
Date / Time of Evaluation:   2/25/2022    6:36 PM  Assessment Completed by:   Wayman Goodpasture, RN      Patient Name:   Mary Maciel  MRN:   [id number]  [place]:   1950    Patient Admission Status:   inpatient    Patient Contact Information:    [address]  [phone] (home) [phone] (work)  Telephone Information:   Mobile [phone]     Above information verified? [x]   Yes  []   No    (Best Practice:   Have patient/caregiver verify above address and phone number by stating out loud their current address and reachable phone number. Initial Assessment Completed at bedside with:      [x]   Patient  [x]   Family/Caregiver/Guardian spouse []   Other:      Current PCP:    Momo Bruno MD    PCP verified? [x]   Yes  []   No    Emergency Contacts:    Extended Emergency Contact Information  Primary Emergency Contact: Jes Weems  Address: [place] [address]. [address] Phone: [phone]  Mobile Phone: [phone]  Relation: Spouse  Secondary Emergency Contact: [address] Phone: [phone]  Relation: Child    Advance Directives:    Does Mr. Leonor Sanford have an advance directive in his electronic medical record? []   Yes  [x]   No    Code Status:   Full Code      Have you been vaccinated for COVID-19 (SARS-CoV-2)? [x]   Yes  []   No                   If so, when?     Which :         []   Pfizer-BioNTech  [x]   Moderna x2 and booster[]   [employer]  []   Other:       Do you have any of the following unmet social needs that would keep you from returning home safely:    []   Yes  [x]   No                    Unmet Social Needs:           []   Living Situation/Housing  []   Food  []   Stroke Education   []   Utilities  []   Personal Safety  []   Financial Strain  []   Employment  []   Mental Health  []   Substance Abuse  []   Transportation Barriers    Financial:    Payor: Malgorzata Trevino / Plan: Daquan Landers ESSENTIAL/PLUS / Product Type: *No Product type* /     Pre-Cert required for SNF:     [x]   Yes  []   No    Have Long Term Care Insurance:      []   Yes  [x]   No      Pharmacy:    [facility] [address] [phone]  [address] [place] [address]  Phone: [phone] Fax: [address] [facility] [address]  [address]  [facility] [place] [address]  Phone: [phone] Fax: [phone]    Potential assistance purchasing medications? []   Yes  [x]   No       ADLS:   Pt is independent at baseline, he is still working in security at [employer]. Wife also works at . [address] [employer]: Wife and adult daughter      Current Home Environment:       Steps:       [x]   Yes  []   No    If yes, how many? mananging     Plans to RETURN to current housing:     [x]   Yes  []   No    Barriers to RETURNING to current housing:  None stated    Currently ACTIVE with Home Health CARE:      []   Yes  [x]   No      DME Provider:   No DME    Had HOME OXYGEN prior to admission:      []   Yes  [x]   No    Has a pulse oximetry unit at home:     []   Yes  [x]   No    Active with HD/PD prior to admission:             []   Yes  [x]   No      Transition Plan:  Home with self/spouse    Transportation PLAN for Discharge:  Private car    Factors facilitating achievement of predicted outcomes: medically stable    Barriers to discharge:  Pt reports, UTI since last year.  Septic shock, ESBL E COLI  bacteremia      Patient Deficits:    [x]   Yes overall weakness  []   No    If yes:    []   Confusion/Memory  []   Visual  []   Motor/Sensory         []   Right arm         []   Right leg         []   Left arm         []   Left leg  []   Language/Speech         []   Aphasia         []   Dysarthria         []   Swallow        Herlinda Coma Scale  Eye Opening: Spontaneous  Best Verbal Response: Oriented  Best Motor Response: Obeys commands  Dunbar Coma Scale Score: 15      Additional CM Notes: Pt reports, he has had IV therapy prior which was done at [facility], this would be his first choice.  Per note, pt will requiring IV antibiotics    Recommend the following antibiotic treatment:  Meropenem 1 g IV daily through March 16, 2022  Or  Ertapenem 500 mg IV daily through March 16, 2022      Renetta Patten and/or his family were provided with choice of provider:    [x]   Yes   []   No      Magdalena Morales RN  [address]  Phone:  [phone]   Fax:  [phone]  Electronically signed by Magdalena Morales RN on 2/25/2022 at 6:43 PM

## 2022-02-26 NOTE — CARE COORDINATION
[address] Vik[address] with nurse who states pt can recieve his daily IV therapy there as an outpatient. She states she needs to verify this could be done over the weekend if pt was discharged. Awaiting call back.

## 2022-02-26 NOTE — PROGRESS NOTES
Urology Progress Note      SUBJECTIVE:    No acute events overnight. Afebrile. Drain with no output. Continued clear urine. Denies flank pain. OBJECTIVE:      Physical  VITALS:  BP (!) 155/96   Pulse 74   Temp 98.1 °F (36.7 °C) (Temporal)   Resp 20   Ht 6' 3\" (1.905 m)   Wt 291 lb 2 oz (132.1 kg)   SpO2 92%   BMI 36.39 kg/m²   TEMPERATURE:  Current - Temp: 98.1 °F (36.7 °C); Max - Temp  Av.5 °F (36.9 °C)  Min: 98.1 °F (36.7 °C)  Max: 99.1 °F (37.3 °C)   24 HR I&O   Intake/Output Summary (Last 24 hours) at 2022 0824  Last data filed at 2022 0433  Gross per 24 hour   Intake 1791 ml   Output 3900 ml   Net -2109 ml     BACK: No tenderness in spine or flanks. Right percutaneous drain in place with scant output. ABDOMEN:  Soft, non-distended, non-tender, abdominal obesity. HEART:  normal rate, intact distal pulses and no carotid bruits  CHEST:  No Wheezes, no increased work of breathing. GENITAL/URINARY:  Normal male phallus. Reardon catheter in place draining minimal clear urine with no evidence of purulence.      Data      CBC:   Recent Labs     22  0014 22  0211 22  0339   WBC 19.2* 12.8* 7.0   HGB 10.6* 11.0* 11.9*   HCT 36.2* 37.5* 39.9*    162 173     BMP:    Recent Labs     22  0014 22  0211 22  0339     141 140 144   K 4.9  4.9 4.3 4.7     109 109 110   CO2 22  21* 20* 23   BUN 43*  43* 43* 37*   CREATININE 2.7*  2.7* 2.1* 1.9*   GLUCOSE 124*  129* 117* 99       U/A:    Lab Results   Component Value Date    NITRITE neg 2021    COLORU YELLOW 2022    PHUR 5.5 2022    WBCUA TNTC 2022    RBCUA 11-15 2022    YEAST 0 2021    BACTERIA 3+ 2022    CLARITYU TURBID 2022    SPECGRAV 1.014 2022    LEUKOCYTESUR LARGE 2022    UROBILINOGEN 1.0 2022    BILIRUBINUR Negative 2022    BILIRUBINUR neg 2021    BLOODU MODERATE 2022    GLUCOSEU Negative 2022 Radiology:   CT ABDOMEN PELVIS WO CONTRAST Additional Contrast? None    Result Date: 2/25/2022  CT ABDOMEN PELVIS WO CONTRAST 2/25/2022 9:46 AM History: Infected right renal cyst. Follow-up imaging. In order to have a CT radiation dose as low as reasonably achievable Automated Exposure Control was utilized for adjustment of the mA and/or KV according to patient size. DLP in mGycm= 3582. Noncontrast abdomen/pelvis CT. Comparison is made with February 22, 2022. Percutaneous drainage catheter remains in place within the periphery of the lower pole right renal cyst. Trace amount of focal fluid and air persists though the tube remains in good position. Originally the estimated volume within the structure was 40 mL. At this time no more than 2 to 3 mL persists Well-positioned right ureteral stent. No hydronephrosis. Nonobstructing punctate calcification within the midportion of the right kidney. Several small nonobstructing stones within the left kidney. No left hydronephrosis. No left ureteral stone. Normal heart size. Clear lung bases. Normal noncontrast appearance of the liver, gallbladder, pancreas, spleen, and adrenal glands. No bowel dilation. No pelvic mass or fluid. Urinary bladder catheter in good position. 1. Almost complete drainage of the infected left renal cyst. 2. The percutaneous drainage catheter remains in good position. Signed by Dr Yair Velez    Result Date: 2/23/2022  EXAMINATION:  CT GUIDED NEEDLE PLACEMENT  2/23/2022 3:38 PM HISTORY: The patient has a 4.5 cm infected cyst versus abscess arising from or adjacent to the lower pole right kidney. COMPARISON: CT on 2/22/2022. CONSENT: The CT guided drainage catheter placement procedure was discussed with the patient, including the risk of bleeding and infection. Questions were answered. The patient agreed to the procedure. SEDATION: Moderate conscious sedation performed for patient comfort.  Genny Oliva Jasmyne Duran M.D. supervised the procedure. A dedicated nurse monitored the patient throughout the procedure including heart rate, blood pressure, respiratory rate and pulse oximetry. 1 mcg fentanyl and 50 mg Versed were administered intravenously. The moderate conscious sedation time was greater than 20 minutes. DLP: 1078 mGy-cm. Automated exposure control was utilized to decrease patient radiation dosage. PROCEDURE: The patient was placed on his left side and localization images were obtained with CT. The skin was marked at the level of the collection arising from or adjacent to the lower pole right kidney. Sterile gloves were utilized. The skin was prepped with ChloraPrep. A sterile drape was applied. Using CT guidance, 1% lidocaine was infiltrated into the soft tissues for local anesthesia. A 5 [place] 10 cm multihole catheter over needle was then advanced into the collection using CT guidance. When the collection was entered, about 2 mL of purulent fluid was withdrawn. The stylette was removed. A guidewire was advanced through the catheter. The catheter was removed over the guidewire. 6 Western Rachna and 8 Western Rachna dilators were then advanced over the wire and into the collection to dilate the tract. Subsequently, an 8 [place] multihole catheter was advanced over the wire and into the collection. Imaging was performed intermittently during this process. The stiffener and wire were removed from the catheter and the catheter loop was locked in place. A final set of images demonstrate the pigtail loop curled within the fluid collection. Approximately 8 additional cc of purulent fluid was then aspirated from the collection. This will be sent for culture. The patient tolerated the procedure well. He left the department in good condition and was verbal.    CT guidance was utilized during placement of an 8 [place] pigtail catheter into a fluid collection arising from or adjacent to the lower pole right kidney.  The procedure was performed without difficulty or immediate complication, as above. Signed by Dr Lilian Dorsey    Result Date: 2/22/2022  History: Hypoxia Nuclear medicine perfusion scan: Following IV injection of 5.5 mCi of technetium 99m MAA particles, multiple projectional images the chest were obtained. COMPARISON: Chest x-ray 2/22/2022 FINDINGS: There is homogeneous tracer activity of the bilateral lung parenchyma with no segmental perfusion defect. Findings consistent with very low probability of pulmonary emboli. 1. Very low probability of pulmonary emboli. Signed by Dr Roxie Rodarte    [facility]    Result Date: 2/22/2022  Radiology exam is complete. No Radiologist dictation. Please follow up with ordering provider. CT KIDNEY WO CONTRAST    Result Date: 2/22/2022  CT abdomen and pelvis 2/22/2022 2:55 PM HISTORY: Right flank pain, renal lithotripsy 1/6/2022 TECHNIQUE: Axial images of the abdomen and pelvis were obtained without IV contrast. Coronal and sagittal reformatted images are reconstructed and reviewed. COMPARISON: 8/12/2018. DLP: 1626 mGy cm Automated exposure control was utilized to minimize patient radiation dose. FINDINGS: There is a 5 mm right proximal to mid ureteral stone positioned at the L4 level resulting in mild to moderate right-sided hydronephrosis. There are a few punctate nonobstructing bilateral intrarenal stones. There is left renal atrophy. Inseparable from the inferior pole of the right kidney at the site of a previous 2.6 cm simple appearing cyst, there is a 4.4 x 4.5 x 4.4 cm hypodense collection with a few regional air bubbles is stranding of the adjacent perinephric fat as well as right so as muscle. Finding is concerning for an infected renal cyst. Bladder is under distended. Prostate does not appear enlarged. Fatty-containing left inguinal hernia. Probable hepatic steatosis.  The nonenhanced liver, spleen, pancreas, gallbladder, and  Obstructive pyelonephritis    GUICHO (acute kidney injury) ([facility] [address]    Infected kidney cyst, right    Sepsis ([facility] [address]         1. Sepsis secondary to obstructive pyelonephritis from right ureteral calculus status post stent placement.  -Continued clinical improvement. -PICC placed and plan for extended antibiotics and is afebrile  - His percutaneous drain has had minimal output since after initial placement and repeat CT demonstrates minimal residual collection. We will plan to remove before discharge.   -Being followed by infectious disease, appreciate recommendations.      3. Obstructing right ureteral calculus status post stent placement. Will need to definitively treat stone once he has completed at least 3 weeks of culture specific antibiotics. We will arrange follow-up in clinic for a symptom check in 3 weeks and then plan definitive treatment.      4. GUICHO. Renal function slowly improving. Good urine output. Since creatinine appears to have reached a plateau, OK with dupree catheter removal and trial of voiding today.

## 2022-02-26 NOTE — PROGRESS NOTES
Hospitalist Progress Note  [facility]     Patient: Ruy Gonzales  : 1950  MRN: [id number]  Code Status: Full Code    Hospital Day: 4   Date of Service: 2022    Subjective:   Patient seen and examined. No current complaints. Past Medical History:   Diagnosis Date    ACS (acute coronary syndrome) ([facility] [address] 2016  ACS  BRANDO RISK Score 1 (angina), AUC indication 3, AUC score 7 16  Cath  99% 1st diagonal, 2.25 x 15 YURIDIA, anterior lateral hypokinesis, EF 45%     CAD (coronary artery disease)     has seen dr. Richard Clifford in the past    Cerebral artery occlusion with cerebral infarction ([facility] [address]     Difficulty voiding     per pt c/o.     Hyperlipidemia     Hypertension 2016    Kidney stone     MI (myocardial infarction) ([facility] [address]     stent    Renal calculus, right 2018    UTI (urinary tract infection)     due to stones; pt has been taking iv antibiotics at home; 22 last dose       Past Surgical History:   Procedure Laterality Date    CARDIAC CATHETERIZATION      COLONOSCOPY      CYSTOSCOPY Right 2022    CYSTOSCOPY ,URETEROSCOPY WITH URETERAL STENT INSERTION performed by Sebas Grimes MD at [facility] [place] Pro Right 2018    CYSTOSCOPY STENT REMOVAL performed by Sebas Grimes MD at [facility] LITHOTRIPSY Left 2022    LEFT RENAL EXTRACORPOREAL SHOCK WAVE LITHOTRIPSY performed by Sebas Grimes MD at [facility] CIRCUMCISION,OTHER,28+ D/O N/A 2018    DORSAL SLIT performed by Sebas Grimes MD at [address] &[facility]WE[facility] STENT INSRT Right 2018    CYSTOSCOPY URETEROSCOPY LASER LITHOTRIPSY performed by Sebas Grimes MD at [facility] ESWL Right 2018    ESWL [address] LITHOTRIPSY performed by Sebas Grimes MD at [facility] OR       Family History   Problem Relation Age of Onset    Cancer Sister     Cancer Brother  Heart Disease Father        Social History     Socioeconomic History    Marital status:      Spouse name: jessica gunn    Number of children: 2    Years of education: Not on file    Highest education level: Not on file   Occupational History    Occupation: farmer    Tobacco Use    Smoking status: Former Smoker     Packs/day: 3.00     Years: 35.00     Pack years: 105.00    Smokeless tobacco: Former User     Quit date: 4/27/2001   Vaping Use    Vaping Use: Never used   Substance and Sexual Activity    Alcohol use: No    Drug use: No    Sexual activity: Yes     Partners: Female   Other Topics Concern    Not on file   Social History Narrative    Not on file     Social Determinants of Health     Financial Resource Strain:     Difficulty of Paying Living Expenses: Not on file   Food Insecurity:     Worried About [address] in the Last Year: Not on file    Lilian of Food in the Last Year: Not on file   Transportation Needs:     Lack of Transportation (Medical): Not on file    Lack of Transportation (Non-Medical):  Not on file   Physical Activity:     Days of Exercise per Week: Not on file    Minutes of Exercise per Session: Not on file   Stress:     Feeling of Stress : Not on file   Social Connections:     Frequency of Communication with Friends and Family: Not on file    Frequency of Social Gatherings with Friends and Family: Not on file    Attends Jewish Services: Not on file    Active Member of Clubs or Organizations: Not on file    Attends Club or Organization Meetings: Not on file    Marital Status: Not on file   Intimate Partner Violence:     Fear of Current or Ex-Partner: Not on file    Emotionally Abused: Not on file    Physically Abused: Not on file    Sexually Abused: Not on file   Housing Stability:     Unable to Pay for Housing in the Last Year: Not on file    Number of [place] in the Last Year: Not on file    Unstable Housing in the Last Year: Not on file       Current Facility-Administered Medications   Medication Dose Route Frequency Provider Last Rate Last Admin    lidocaine PF 1 % injection 5 mL  5 mL IntraDERmal Once Manjinder Landis MD        sodium chloride flush 0.9 % injection 5-40 mL  5-40 mL IntraVENous 2 times per day Manjinder Landis MD   10 mL at 02/25/22 0903    sodium chloride flush 0.9 % injection 5-40 mL  5-40 mL IntraVENous PRN Manjinder Landis MD        0.9 % sodium chloride infusion  25 mL IntraVENous PRN Manjinder Landis MD        melatonin capsule 10 mg  10 mg Oral Nightly Manjinder Landis MD   10 mg at 02/25/22 2234    lactated ringers infusion   IntraVENous Continuous Manjinder Landis MD 50 mL/hr at 02/25/22 1112 Rate Change at 02/25/22 1112    0.9 % sodium chloride infusion   IntraVENous PRN Jinny Monk MD        HYDROmorphone HCl PF (DILAUDID) injection 0.5 mg  0.5 mg IntraVENous Q3H PRN Jinny Monk MD   0.5 mg at 02/26/22 9597    glucagon (rDNA) injection 1 mg  1 mg IntraMUSCular PRN [place] Halsted, APRN - CNP        dextrose 5 % solution  100 mL/hr IntraVENous PRN [place] Halsted, APRN - CNP        dextrose bolus (hypoglycemia) 10% 125 mL  125 mL IntraVENous PRN [place] Halsted, APRN - CNP        Or    dextrose bolus (hypoglycemia) 10% 250 mL  250 mL IntraVENous PRN [place] Halsted, APRN - CNP   Stopped at 02/23/22 2328    glucose chewable tablet 4 each  4 tablet Oral PRN [place] Halsted, APRN - CNP        dilTIAZem (CARDIZEM CD) extended release capsule 120 mg  120 mg Oral Daily Jinny Monk MD   120 mg at 02/26/22 2113    metoprolol succinate (TOPROL XL) extended release tablet 25 mg  25 mg Oral Daily Manjinder Landis MD   25 mg at 02/26/22 9573    sodium chloride flush 0.9 % injection 5-40 mL  5-40 mL IntraVENous 2 times per day Jinny Monk MD   10 mL at 02/25/22 0902    sodium chloride flush 0.9 % injection 5-40 mL  5-40 mL IntraVENous PRN Jinny Monk MD        0.9 % sodium chloride infusion  25 mL IntraVENous PRN Guanakito Horner MD        enoxaparin (LOVENOX) injection 40 mg  40 mg SubCUTAneous Daily Guanakito Horner MD   40 mg at 02/25/22 1757    acetaminophen (TYLENOL) tablet 650 mg  650 mg Oral Q6H PRN Guanakito Horner MD   650 mg at 02/22/22 1910    Or    acetaminophen (TYLENOL) suppository 650 mg  650 mg Rectal Q6H PRN Guanakito Horner MD        meropenem [facility]) 1000 mg in sodium chloride 0.9% 50 mL (duplex)  1,000 mg IntraVENous Q24H Guanakito Horner MD   Stopped at 02/25/22 1828    melatonin capsule 10 mg  10 mg Oral Nightly PRN Patience Tu, DO   10 mg at 02/22/22 2321         sodium chloride      lactated ringers 50 mL/hr at 02/25/22 1112    sodium chloride      dextrose      sodium chloride          Objective:   BP (!) 155/96   Pulse 74   Temp 98.1 °F (36.7 °C) (Temporal)   Resp 20   Ht 6' 3\" (1.905 m)   Wt 291 lb 2 oz (132.1 kg)   SpO2 92%   BMI 36.39 kg/m²     General: no acute distress  HEENT: normocephalic, atraumatic  Neck: supple, symmetrical, trachea midline   Lungs: clear to auscultation bilaterally   Cardiovascular: s1 and s2 normal  Abdomen: soft, positive bowel sounds  Extremities: no edema or cyanosis   Neuro: aaox3, no focal deficits   Skin: normal color and texture     Recent Labs     02/24/22  0014 02/25/22 0211 02/26/22  0339   WBC 19.2* 12.8* 7.0   RBC 4.17* 4.39* 4.74   HGB 10.6* 11.0* 11.9*   HCT 36.2* 37.5* 39.9*   MCV 86.8 85.4 84.2   MCH 25.4* 25.1* 25.1*   MCHC 29.3* 29.3* 29.8*    162 173     Recent Labs     02/24/22  0014 02/25/22 0211 02/26/22  0339     141 140 144   K 4.9  4.9 4.3 4.7   ANIONGAP 10  11 11 11     109 109 110   CO2 22  21* 20* 23   BUN 43*  43* 43* 37*   CREATININE 2.7*  2.7* 2.1* 1.9*   GLUCOSE 124*  129* 117* 99   CALCIUM 8.3*  8.7* 8.0* 8.6*     Recent Labs     02/24/22  0014 02/25/22 0211 02/26/22  0339   MG 2.2 1.9 1.7     Recent Labs     02/24/22 0014 02/25/22 0211 02/26/22  0339   AST 32 39 27   ALT 30 43* 39   BILITOT 0.3 0.4 0.3   ALKPHOS 72 64 67     No results for input(s): PH, PO2, PCO2, HCO3, BE, O2SAT in the last 72 hours. No results for input(s): TROPONINI in the last 72 hours. No results for input(s): INR in the last 72 hours. No results for input(s): LACTA in the last 72 hours. Intake/Output Summary (Last 24 hours) at 2/26/2022 1405  Last data filed at 2/26/2022 1019  Gross per 24 hour   Intake 1911 ml   Output 2900 ml   Net -989 ml       CT ABDOMEN PELVIS WO CONTRAST Additional Contrast? None    Result Date: 2/25/2022  CT ABDOMEN PELVIS WO CONTRAST 2/25/2022 9:46 AM History: Infected right renal cyst. Follow-up imaging. In order to have a CT radiation dose as low as reasonably achievable Automated Exposure Control was utilized for adjustment of the mA and/or KV according to patient size. DLP in mGycm= 3582. Noncontrast abdomen/pelvis CT. Comparison is made with February 22, 2022. Percutaneous drainage catheter remains in place within the periphery of the lower pole right renal cyst. Trace amount of focal fluid and air persists though the tube remains in good position. Originally the estimated volume within the structure was 40 mL. At this time no more than 2 to 3 mL persists Well-positioned right ureteral stent. No hydronephrosis. Nonobstructing punctate calcification within the midportion of the right kidney. Several small nonobstructing stones within the left kidney. No left hydronephrosis. No left ureteral stone. Normal heart size. Clear lung bases. Normal noncontrast appearance of the liver, gallbladder, pancreas, spleen, and adrenal glands. No bowel dilation. No pelvic mass or fluid. Urinary bladder catheter in good position. 1. Almost complete drainage of the infected left renal cyst. 2. The percutaneous drainage catheter remains in good position.  Signed by Dr Whitlock Distance and Plan:   Septic shock  Secondary to below  Agents per ID  Follow cultures  IVF  Norepinephrine gtt since weaned off  Lactate improved     Right obstructive pyelonephritis/abscess  Urology following  Right ureteral stone s/p stent placement  Right renal abscess s/p drain placement  History of ESBL E. Coli  Current urine culture ESBL E. coli  Agents per ID  Drain DC'd 2/26/2022     ESBL E. coli bacteremia  Secondary to above  Agents per ID  Follow repeat cultures     GUICHO  Secondary to above  Creatinine continues to improve  IVF  Avoid offending agents  Follow renal function/urine output/electrolytes     DVT prophylaxis  Lovenox    Brando Merchant MD   2/26/2022 2:05 PM

## 2022-02-27 LAB
ALBUMIN SERPL-MCNC: 2.8 G/DL (ref 3.5–5.2)
ALP BLD-CCNC: 66 U/L (ref 40–130)
ALT SERPL-CCNC: 32 U/L (ref 5–41)
ANAEROBIC CULTURE: ABNORMAL
ANION GAP SERPL CALCULATED.3IONS-SCNC: 10 MMOL/L (ref 7–19)
AST SERPL-CCNC: 19 U/L (ref 5–40)
BASOPHILS ABSOLUTE: 0 K/UL (ref 0–0.2)
BASOPHILS RELATIVE PERCENT: 0.4 % (ref 0–1)
BILIRUB SERPL-MCNC: 0.5 MG/DL (ref 0.2–1.2)
BLOOD CULTURE, ROUTINE: NORMAL
BUN BLDV-MCNC: 33 MG/DL (ref 8–23)
CALCIUM SERPL-MCNC: 8.5 MG/DL (ref 8.8–10.2)
CHLORIDE BLD-SCNC: 106 MMOL/L (ref 98–111)
CO2: 23 MMOL/L (ref 22–29)
CREAT SERPL-MCNC: 1.7 MG/DL (ref 0.5–1.2)
CULTURE, BLOOD 2: NORMAL
EOSINOPHILS ABSOLUTE: 0.2 K/UL (ref 0–0.6)
EOSINOPHILS RELATIVE PERCENT: 2.5 % (ref 0–5)
GFR AFRICAN AMERICAN: 48
GFR NON-AFRICAN AMERICAN: 40
GLUCOSE BLD-MCNC: 94 MG/DL (ref 70–99)
GLUCOSE BLD-MCNC: 94 MG/DL (ref 70–99)
GLUCOSE BLD-MCNC: 97 MG/DL (ref 70–99)
GLUCOSE BLD-MCNC: 97 MG/DL (ref 74–109)
GLUCOSE BLD-MCNC: 99 MG/DL (ref 70–99)
GRAM STAIN RESULT: ABNORMAL
HCT VFR BLD CALC: 40.4 % (ref 42–52)
HEMOGLOBIN: 12.4 G/DL (ref 14–18)
IMMATURE GRANULOCYTES #: 0.2 K/UL
LYMPHOCYTES ABSOLUTE: 0.9 K/UL (ref 1.1–4.5)
LYMPHOCYTES RELATIVE PERCENT: 13.4 % (ref 20–40)
MAGNESIUM: 1.7 MG/DL (ref 1.6–2.4)
MCH RBC QN AUTO: 25.4 PG (ref 27–31)
MCHC RBC AUTO-ENTMCNC: 30.7 G/DL (ref 33–37)
MCV RBC AUTO: 82.6 FL (ref 80–94)
MONOCYTES ABSOLUTE: 0.6 K/UL (ref 0–0.9)
MONOCYTES RELATIVE PERCENT: 7.9 % (ref 0–10)
NEUTROPHILS ABSOLUTE: 5 K/UL (ref 1.5–7.5)
NEUTROPHILS RELATIVE PERCENT: 72.3 % (ref 50–65)
ORGANISM: ABNORMAL
PDW BLD-RTO: 13.5 % (ref 11.5–14.5)
PERFORMED ON: NORMAL
PLATELET # BLD: 157 K/UL (ref 130–400)
PMV BLD AUTO: 11.7 FL (ref 9.4–12.4)
POTASSIUM SERPL-SCNC: 4.2 MMOL/L (ref 3.5–5)
RBC # BLD: 4.89 M/UL (ref 4.7–6.1)
SODIUM BLD-SCNC: 139 MMOL/L (ref 136–145)
TOTAL PROTEIN: 5.9 G/DL (ref 6.6–8.7)
WBC # BLD: 6.9 K/UL (ref 4.8–10.8)
WOUND/ABSCESS: ABNORMAL

## 2022-02-27 PROCEDURE — 99231 SBSQ HOSP IP/OBS SF/LOW 25: CPT | Performed by: STUDENT IN AN ORGANIZED HEALTH CARE EDUCATION/TRAINING PROGRAM

## 2022-02-27 PROCEDURE — 85025 COMPLETE CBC W/AUTO DIFF WBC: CPT

## 2022-02-27 PROCEDURE — 36415 COLL VENOUS BLD VENIPUNCTURE: CPT

## 2022-02-27 PROCEDURE — 80053 COMPREHEN METABOLIC PANEL: CPT

## 2022-02-27 PROCEDURE — 6360000002 HC RX W HCPCS: Performed by: UROLOGY

## 2022-02-27 PROCEDURE — 83735 ASSAY OF MAGNESIUM: CPT

## 2022-02-27 PROCEDURE — 6370000000 HC RX 637 (ALT 250 FOR IP): Performed by: UROLOGY

## 2022-02-27 PROCEDURE — 6370000000 HC RX 637 (ALT 250 FOR IP): Performed by: INTERNAL MEDICINE

## 2022-02-27 PROCEDURE — 1210000000 HC MED SURG R&B

## 2022-02-27 PROCEDURE — 82947 ASSAY GLUCOSE BLOOD QUANT: CPT

## 2022-02-27 RX ADMIN — METOPROLOL SUCCINATE 25 MG: 25 TABLET, FILM COATED, EXTENDED RELEASE ORAL at 09:08

## 2022-02-27 RX ADMIN — Medication 10 MG: at 20:46

## 2022-02-27 RX ADMIN — DILTIAZEM HYDROCHLORIDE 120 MG: 120 CAPSULE, COATED, EXTENDED RELEASE ORAL at 09:08

## 2022-02-27 RX ADMIN — HYDROMORPHONE HYDROCHLORIDE 0.5 MG: 1 INJECTION, SOLUTION INTRAMUSCULAR; INTRAVENOUS; SUBCUTANEOUS at 20:46

## 2022-02-27 RX ADMIN — ENOXAPARIN SODIUM 40 MG: 100 INJECTION SUBCUTANEOUS at 17:58

## 2022-02-27 RX ADMIN — MEROPENEM AND SODIUM CHLORIDE 1000 MG: 1 INJECTION, SOLUTION INTRAVENOUS at 17:57

## 2022-02-27 ASSESSMENT — PAIN SCALES - GENERAL: PAINLEVEL_OUTOF10: 5

## 2022-02-27 NOTE — PROGRESS NOTES
Infectious Diseases Progress Note    Patient:  Priscila Beyer  YOB: 1950  MRN: [id number]   Admit date: 2/22/2022   Admitting Physician: Edmar Lawson MD  Primary Care Physician: Inez Fox MD    Chief Complaint/Interval History:   He feels okay. He hopes to go home soon. No abdominal or flank pain. No urinary symptoms at present. Awaiting arrangements for home/outpatient antimicrobial treatment. In/Out    Intake/Output Summary (Last 24 hours) at 2/27/2022 1102  Last data filed at 2/27/2022 0957  Gross per 24 hour   Intake 2133.18 ml   Output 3275 ml   Net -1141.82 ml     Allergies: Allergies   Allergen Reactions    Codeine Other (See Comments)     Describes it as a sensitivity.        Current Meds: docusate sodium (COLACE) capsule 100 mg, BID  senna (SENOKOT) tablet 8.6 mg, Nightly  lidocaine PF 1 % injection 5 mL, Once  sodium chloride flush 0.9 % injection 5-40 mL, 2 times per day  sodium chloride flush 0.9 % injection 5-40 mL, PRN  0.9 % sodium chloride infusion, PRN  melatonin capsule 10 mg, Nightly  lactated ringers infusion, Continuous  0.9 % sodium chloride infusion, PRN  HYDROmorphone HCl PF (DILAUDID) injection 0.5 mg, Q3H PRN  glucagon (rDNA) injection 1 mg, PRN  dextrose 5 % solution, PRN  dextrose bolus (hypoglycemia) 10% 125 mL, PRN   Or  dextrose bolus (hypoglycemia) 10% 250 mL, PRN  glucose chewable tablet 4 each, PRN  dilTIAZem (CARDIZEM CD) extended release capsule 120 mg, Daily  metoprolol succinate (TOPROL XL) extended release tablet 25 mg, Daily  sodium chloride flush 0.9 % injection 5-40 mL, 2 times per day  sodium chloride flush 0.9 % injection 5-40 mL, PRN  0.9 % sodium chloride infusion, PRN  enoxaparin (LOVENOX) injection 40 mg, Daily  acetaminophen (TYLENOL) tablet 650 mg, Q6H PRN   Or  acetaminophen (TYLENOL) suppository 650 mg, Q6H PRN  meropenem (MERREM) 1000 mg in sodium chloride 0.9% 50 mL (duplex), Q24H  melatonin capsule 10 mg, Nightly PRN      Review of Systems no diarrhea or rash    VitalSigns:  BP (!) 158/92   Pulse 78   Temp 97.5 °F (36.4 °C) (Temporal)   Resp 18   Ht 6' 3\" (1.905 m)   Wt 291 lb 2 oz (132.1 kg)   SpO2 95%   BMI 36.39 kg/m²      Physical Exam  Line/IV site: No erythema, warmth, induration, or tenderness. Abdomen soft and nontender  No suprapubic or flank tenderness    Lab Results:  CBC:   Recent Labs     02/25/22 0211 02/26/22 0339 02/27/22 0226   WBC 12.8* 7.0 6.9   HGB 11.0* 11.9* 12.4*    173 157     BMP:  Recent Labs     02/25/22 0211 02/26/22 0339 02/27/22 0226    144 139   K 4.3 4.7 4.2    110 106   CO2 20* 23 23   BUN 43* 37* 33*   CREATININE 2.1* 1.9* 1.7*   GLUCOSE 117* 99 97     CultureResults:  Urine culture February 22, 2022:   Escherichia coli (esbl)     BACTERIAL SUSCEPTIBILITY PANEL BY ARAMIS     ampicillin >=32 mcg/mL Resistant     ampicillin-sulbactam >=32 mcg/mL Resistant     aztreonam  Resistant     ceFAZolin >=64 mcg/mL Resistant     cefepime  Resistant     cefTRIAXone >=64 mcg/mL Resistant     ertapenem <=0.5 mcg/mL Sensitive     gentamicin <=1 mcg/mL Sensitive     levofloxacin >=8 mcg/mL Resistant     meropenem <=0.25 mcg/mL Sensitive     nitrofurantoin <=16 mcg/mL Sensitive     trimethoprim-sulfamethoxazole >=320 mcg/mL Resistant      Radiology: None    Additional Studies Reviewed:  None    Impression:  1. ESBL positive E. coli UTI with bacteremia  2. Obstructive pyelonephritis with abscess  3. Nephrolithiasis  4.   Renal insufficiency-improving with creatinine down to 1.7    Recommendations:  Continue treatment with meropenem  Suggest meropenem 1 g IV every 12 hours through March 16, 2022  Or  Ertapenem 1 g IV daily through March 16, 2022  Will check with  tomorrow as to where things stand with home, outpatient, or subacute rehab antibiotic treatment    Koffi Hamm MD

## 2022-02-27 NOTE — PROGRESS NOTES
Urology Progress Note      SUBJECTIVE:    No acute events overnight. Voiding spontaneously without pain. Dressing site clean from drain removal.     OBJECTIVE:      REVIEW OF SYSTEMS:  10-point ROS performed and negative except as stated above in HPI      Physical  VITALS:  BP (!) 158/92   Pulse 78   Temp 97.5 °F (36.4 °C) (Temporal)   Resp 18   Ht 6' 3\" (1.905 m)   Wt 291 lb 2 oz (132.1 kg)   SpO2 95%   BMI 36.39 kg/m²   TEMPERATURE:  Current - Temp: 97.5 °F (36.4 °C); Max - Temp  Av.3 °F (37.4 °C)  Min: 97.5 °F (36.4 °C)  Max: 101.1 °F (38.4 °C)   24 HR I&O   Intake/Output Summary (Last 24 hours) at 2022 9329  Last data filed at 2022 8535  Gross per 24 hour   Intake 2013.18 ml   Output 3275 ml   Net -1261.82 ml     Constitutional: Well developed, well nourished in no acute distress  Psychiatric: Mood indicates no abnormalities. Pt doesnt appear depressed  Neurologic: Oriented to time, person, and place  Neck: Neck is supple, trachea is midline  Respiratory: Respiratory effort is normal  Cardiovascular: Cardiovascular exam show no extremity swelling, regular rate, normal radial pulse  Abdomen: Soft, non-tender abdomen ,no masses or hernias are palpated. Skin: Skin show no abnormal lesions  Genitourinary: No flank pain, no suprapubic tenderness.  Right flank dressing clean and dry    Data     CBC:   Recent Labs     22   WBC 12.8* 7.0 6.9   HGB 11.0* 11.9* 12.4*   HCT 37.5* 39.9* 40.4*    173 157     BMP:    Recent Labs     22    144 139   K 4.3 4.7 4.2    110 106   CO2 20* 23 23   BUN 43* 37* 33*   CREATININE 2.1* 1.9* 1.7*   GLUCOSE 117* 99 80     ASSESSMENT AND PLAN    Patient Active Problem List   Diagnosis    ACS (acute coronary syndrome) (HCC)    Family history of early CAD    Essential hypertension    CAD (coronary artery disease)    Long term current use of anticoagulant therapy    Renal calculus, right    Phimosis    Right ureteral calculus    Postoperative pain    UTI due to extended-spectrum beta lactamase (ESBL) producing Escherichia coli    Renal calculus, left    Obstructive pyelonephritis    [facility]    Infected kidney cyst, right    Sepsis ([facility]       1. Sepsis secondary to obstructive pyelonephritis from right ureteral calculus status post stent placement.  -Continued clinical improvement. -PICC placed and plan for extended antibiotics and is afebrile. Planning for outpatient antibiotics at [facility], awaiting arrangement for this service for discharge.   -Drain removed on 2/26. No new leukocytosis or fevers. -drain dressing may be removed upon discharge  -Being followed by infectious disease, appreciate recommendations.      2. Acute kidney injury   -Good continued urine output. Creatinine improved to 1.7. Patient is voiding without issues after catheter removal    3. Obstructing right ureteral calculus status post stent placement. He will need definitive stone treatment once he has completed at least 3 weeks of culture specific antibiotics.  F/U instructions placed in the discharge tab for a symptom check in 3 weeks and then plan definitive treatment with Dr. Keith Valenzuela MD

## 2022-02-27 NOTE — PROGRESS NOTES
Hospitalist Progress Note  [facility]     Patient: Obi Will  : 1950  MRN: [id number]  Code Status: Full Code    Hospital Day: 5   Date of Service: 2022    Subjective:   Patient seen and examined. No current complaints. Past Medical History:   Diagnosis Date    ACS (acute coronary syndrome) ([facility] 2016  ACS  BRANDO RISK Score 1 (angina), AUC indication 3, AUC score 7 16  Cath  99% 1st diagonal, 2.25 x 15 YURIDIA, anterior lateral hypokinesis, EF 45%     CAD (coronary artery disease)     has seen dr. Kirsten Craven in the past    Cerebral artery occlusion with cerebral infarction ([facility]     Difficulty voiding     per pt c/o.     Hyperlipidemia     Hypertension 2016    Kidney stone     MI (myocardial infarction) ([facility]     stent    Renal calculus, right 2018    UTI (urinary tract infection)     due to stones; pt has been taking iv antibiotics at home; 22 last dose       Past Surgical History:   Procedure Laterality Date    CARDIAC CATHETERIZATION      COLONOSCOPY      CYSTOSCOPY Right 2022    CYSTOSCOPY ,URETEROSCOPY WITH URETERAL STENT INSERTION performed by Angelique Silva MD at [address] [employer] Right 2018    CYSTOSCOPY STENT REMOVAL performed by Angelique Silva MD at [facility] LITHOTRIPSY Left 2022    LEFT RENAL EXTRACORPOREAL SHOCK WAVE LITHOTRIPSY performed by Angelique Silva MD at \[facility]"" 43 CIRCUMCISION,OTHER,28+ D/O N/A 2018    DORSAL SLIT performed by Angelique Silva MD at [address] &INDWELL STENT INSRT Right 2018    CYSTOSCOPY URETEROSCOPY LASER LITHOTRIPSY performed by Angelique Silva MD at [facility] ESWL Right 2018    ESWL [address] LITHOTRIPSY performed by Angelique Silva MD at [facility] OR       Family History   Problem Relation Age of Onset    Cancer Sister     Cancer Brother  Heart Disease Father        Social History     Socioeconomic History    Marital status:      Spouse name: jessica gunn    Number of children: 2    Years of education: Not on file    Highest education level: Not on file   Occupational History    Occupation: farmer    Tobacco Use    Smoking status: Former Smoker     Packs/day: 3.00     Years: 35.00     Pack years: 105.00    Smokeless tobacco: Former User     Quit date: 4/27/2001   Vaping Use    Vaping Use: Never used   Substance and Sexual Activity    Alcohol use: No    Drug use: No    Sexual activity: Yes     Partners: Female   Other Topics Concern    Not on file   Social History Narrative    Not on file     Social Determinants of Health     Financial Resource Strain:     Difficulty of Paying Living Expenses: Not on file   Food Insecurity:     Worried About [address] in the Last Year: Not on file    Lilian of Food in the Last Year: Not on file   Transportation Needs:     Lack of Transportation (Medical): Not on file    Lack of Transportation (Non-Medical):  Not on file   Physical Activity:     Days of Exercise per Week: Not on file    Minutes of Exercise per Session: Not on file   Stress:     Feeling of Stress : Not on file   Social Connections:     Frequency of Communication with Friends and Family: Not on file    Frequency of Social Gatherings with Friends and Family: Not on file    Attends Gnosticism Services: Not on file    Active Member of Clubs or Organizations: Not on file    Attends Club or Organization Meetings: Not on file    Marital Status: Not on file   Intimate Partner Violence:     Fear of Current or Ex-Partner: Not on file    Emotionally Abused: Not on file    Physically Abused: Not on file    Sexually Abused: Not on file   Housing Stability:     Unable to Pay for Housing in the Last Year: Not on file    Number of [place] in the Last Year: Not on file    Unstable Housing in the Last Year: Not on file       Current Facility-Administered Medications   Medication Dose Route Frequency Provider Last Rate Last Admin    docusate sodium (COLACE) capsule 100 mg  100 mg Oral BID Pritesh Jolly MD   100 mg at 02/26/22 2059    senna (SENOKOT) tablet 8.6 mg  1 tablet Oral Nightly Pritesh Jolly MD   8.6 mg at 02/26/22 1811    lidocaine PF 1 % injection 5 mL  5 mL IntraDERmal Once Pritesh Jolly MD        sodium chloride flush 0.9 % injection 5-40 mL  5-40 mL IntraVENous 2 times per day Pritesh Jolly MD   10 mL at 02/26/22 2113    sodium chloride flush 0.9 % injection 5-40 mL  5-40 mL IntraVENous PRN Pritesh Jolly MD        0.9 % sodium chloride infusion  25 mL IntraVENous PRN Pritesh Jolly MD        melatonin capsule 10 mg  10 mg Oral Nightly Pritesh Jolly MD   10 mg at 02/26/22 2059    lactated ringers infusion   IntraVENous Continuous Pritesh Jolly MD 50 mL/hr at 02/25/22 1112 Rate Change at 02/25/22 1112    0.9 % sodium chloride infusion   IntraVENous PRN Karri Rondon MD        HYDROmorphone HCl PF (DILAUDID) injection 0.5 mg  0.5 mg IntraVENous Q3H PRN Karri Rondon MD   0.5 mg at 02/26/22 2113    glucagon (rDNA) injection 1 mg  1 mg IntraMUSCular PRN Sapna Ortega APRN - CNP        dextrose 5 % solution  100 mL/hr IntraVENous PRN Sapna Ortega, APRN - CNP        dextrose bolus (hypoglycemia) 10% 125 mL  125 mL IntraVENous PRN Sapna Ortega APRN - CNP        Or    dextrose bolus (hypoglycemia) 10% 250 mL  250 mL IntraVENous PRN Sapna Ortega, APRN - CNP   Stopped at 02/23/22 2328    glucose chewable tablet 4 each  4 tablet Oral PRN Sapna Ortega APRN - CNP        dilTIAZem (CARDIZEM CD) extended release capsule 120 mg  120 mg Oral Daily Karri Rondon MD   120 mg at 02/27/22 0908    metoprolol succinate (TOPROL XL) extended release tablet 25 mg  25 mg Oral Daily Pritesh Jolly MD   25 mg at 02/27/22 0908    sodium chloride flush 0.9 % injection 5-40 mL  5-40 mL IntraVENous 2 times per day Meghann Bright MD   10 mL at 02/25/22 0902    sodium chloride flush 0.9 % injection 5-40 mL  5-40 mL IntraVENous PRN Meghann Bright MD        0.9 % sodium chloride infusion  25 mL IntraVENous PRN Meghann Bright MD        enoxaparin (LOVENOX) injection 40 mg  40 mg SubCUTAneous Daily Meghann Bright MD   40 mg at 02/26/22 1734    acetaminophen (TYLENOL) tablet 650 mg  650 mg Oral Q6H PRN Meghann Bright MD   650 mg at 02/22/22 1910    Or    acetaminophen (TYLENOL) suppository 650 mg  650 mg Rectal Q6H PRN Meghann Bright MD        [facility] 1000 mg in sodium chloride 0.9% 50 mL (duplex)  1,000 mg IntraVENous Q24H Meghann Bright MD   Stopped at 02/26/22 1807    melatonin capsule 10 mg  10 mg Oral Nightly PRN Patience Tu, DO   10 mg at 02/22/22 2321         sodium chloride      lactated ringers 50 mL/hr at 02/25/22 1112    sodium chloride      dextrose      sodium chloride          Objective:   BP (!) 158/92   Pulse 78   Temp 97.5 °F (36.4 °C) (Temporal)   Resp 18   Ht 6' 3\" (1.905 m)   Wt 291 lb 2 oz (132.1 kg)   SpO2 95%   BMI 36.39 kg/m²     General: no acute distress  HEENT: normocephalic, atraumatic  Neck: supple, symmetrical, trachea midline   Lungs: clear to auscultation bilaterally   Cardiovascular: s1 and s2 normal  Abdomen: soft, positive bowel sounds  Extremities: no edema or cyanosis   Neuro: aaox3, no focal deficits   Skin: normal color and texture      Recent Labs     02/25/22  0211 02/26/22  0339 02/27/22  0226   WBC 12.8* 7.0 6.9   RBC 4.39* 4.74 4.89   HGB 11.0* 11.9* 12.4*   HCT 37.5* 39.9* 40.4*   MCV 85.4 84.2 82.6   MCH 25.1* 25.1* 25.4*   MCHC 29.3* 29.8* 30.7*    173 157     Recent Labs     02/25/22  0211 02/26/22  0339 02/27/22  0226    144 139   K 4.3 4.7 4.2   ANIONGAP 11 11 10    110 106   CO2 20* 23 23   BUN 43* 37* 33*   CREATININE 2.1* 1.9* 1.7*   GLUCOSE 117* 99 97   CALCIUM 8.0* 8.6* 8.5*     Recent Labs     02/25/22  0211 02/26/22  0339 02/27/22  0226   MG 1.9 1.7 1.7     Recent Labs     02/25/22  0211 02/26/22  0339 02/27/22  0226   AST 39 27 19   ALT 43* 39 32   BILITOT 0.4 0.3 0.5   ALKPHOS 64 67 66     No results for input(s): PH, PO2, PCO2, HCO3, BE, O2SAT in the last 72 hours. No results for input(s): TROPONINI in the last 72 hours. No results for input(s): INR in the last 72 hours. No results for input(s): LACTA in the last 72 hours. Intake/Output Summary (Last 24 hours) at 2/27/2022 1252  Last data filed at 2/27/2022 0957  Gross per 24 hour   Intake 2133.18 ml   Output 3275 ml   Net -1141.82 ml       CT ABDOMEN PELVIS WO CONTRAST Additional Contrast? None    Result Date: 2/25/2022  CT ABDOMEN PELVIS WO CONTRAST 2/25/2022 9:46 AM History: Infected right renal cyst. Follow-up imaging. In order to have a CT radiation dose as low as reasonably achievable Automated Exposure Control was utilized for adjustment of the mA and/or KV according to patient size. DLP in mGycm= 3582. Noncontrast abdomen/pelvis CT. Comparison is made with February 22, 2022. Percutaneous drainage catheter remains in place within the periphery of the lower pole right renal cyst. Trace amount of focal fluid and air persists though the tube remains in good position. Originally the estimated volume within the structure was 40 mL. At this time no more than 2 to 3 mL persists Well-positioned right ureteral stent. No hydronephrosis. Nonobstructing punctate calcification within the midportion of the right kidney. Several small nonobstructing stones within the left kidney. No left hydronephrosis. No left ureteral stone. Normal heart size. Clear lung bases. Normal noncontrast appearance of the liver, gallbladder, pancreas, spleen, and adrenal glands. No bowel dilation. No pelvic mass or fluid. Urinary bladder catheter in good position.     1. Almost complete drainage of the infected left renal cyst. 2. The percutaneous drainage catheter remains in good position.  Signed by Dr Kaleb Smith and Plan:   Septic shock  Secondary to below  Agents per ID  Follow cultures  IVF  Norepinephrine gtt since weaned off  Lactate improved     Right obstructive pyelonephritis/abscess  Urology following  Right ureteral stone s/p stent placement  Right renal abscess s/p drain placement  History of ESBL E. Coli  Current urine culture ESBL E. coli  Agents per ID  Drain DC'd 2/26/2022     ESBL E. coli bacteremia  Secondary to above  Agents per ID  Follow repeat cultures     GUICHO  Secondary to above  Creatinine continues to improve  IVF  Avoid offending agents  Follow renal function/urine output/electrolytes     DVT prophylaxis  Loveeneidax    Aleshia Hylton MD   2/27/2022 12:52 PM

## 2022-02-27 NOTE — PROGRESS NOTES
02/27/22 1158   Subjective   Subjective I have been up walking in the room. Yes by myself. I have been going to the BR alone. No I don't use the walker.    General Comment   Comments Patient declined walk with  therapy   Physical Therapy    Electronically signed by Khoa Frost PTA on 2/27/2022 at 12:02 PM

## 2022-02-28 VITALS
RESPIRATION RATE: 16 BRPM | HEIGHT: 75 IN | OXYGEN SATURATION: 97 % | WEIGHT: 285 LBS | DIASTOLIC BLOOD PRESSURE: 93 MMHG | SYSTOLIC BLOOD PRESSURE: 164 MMHG | TEMPERATURE: 97.9 F | BODY MASS INDEX: 35.43 KG/M2 | HEART RATE: 73 BPM

## 2022-02-28 LAB
ALBUMIN SERPL-MCNC: 3.1 G/DL (ref 3.5–5.2)
ALP BLD-CCNC: 72 U/L (ref 40–130)
ALT SERPL-CCNC: 34 U/L (ref 5–41)
ANION GAP SERPL CALCULATED.3IONS-SCNC: 12 MMOL/L (ref 7–19)
AST SERPL-CCNC: 22 U/L (ref 5–40)
BASOPHILS ABSOLUTE: 0 K/UL (ref 0–0.2)
BASOPHILS RELATIVE PERCENT: 0.4 % (ref 0–1)
BILIRUB SERPL-MCNC: 0.5 MG/DL (ref 0.2–1.2)
BUN BLDV-MCNC: 29 MG/DL (ref 8–23)
CALCIUM SERPL-MCNC: 9.1 MG/DL (ref 8.8–10.2)
CHLORIDE BLD-SCNC: 106 MMOL/L (ref 98–111)
CO2: 23 MMOL/L (ref 22–29)
CREAT SERPL-MCNC: 1.7 MG/DL (ref 0.5–1.2)
EOSINOPHILS ABSOLUTE: 0.3 K/UL (ref 0–0.6)
EOSINOPHILS RELATIVE PERCENT: 2.8 % (ref 0–5)
GFR AFRICAN AMERICAN: 48
GFR NON-AFRICAN AMERICAN: 40
GLUCOSE BLD-MCNC: 82 MG/DL (ref 70–99)
GLUCOSE BLD-MCNC: 90 MG/DL (ref 70–99)
GLUCOSE BLD-MCNC: 96 MG/DL (ref 74–109)
HCT VFR BLD CALC: 42.9 % (ref 42–52)
HEMOGLOBIN: 13.1 G/DL (ref 14–18)
IMMATURE GRANULOCYTES #: 0.3 K/UL
LYMPHOCYTES ABSOLUTE: 1.4 K/UL (ref 1.1–4.5)
LYMPHOCYTES RELATIVE PERCENT: 15 % (ref 20–40)
MAGNESIUM: 1.8 MG/DL (ref 1.6–2.4)
MCH RBC QN AUTO: 25.2 PG (ref 27–31)
MCHC RBC AUTO-ENTMCNC: 30.5 G/DL (ref 33–37)
MCV RBC AUTO: 82.5 FL (ref 80–94)
MONOCYTES ABSOLUTE: 0.8 K/UL (ref 0–0.9)
MONOCYTES RELATIVE PERCENT: 8.2 % (ref 0–10)
NEUTROPHILS ABSOLUTE: 6.5 K/UL (ref 1.5–7.5)
NEUTROPHILS RELATIVE PERCENT: 70.4 % (ref 50–65)
PDW BLD-RTO: 13.5 % (ref 11.5–14.5)
PERFORMED ON: NORMAL
PERFORMED ON: NORMAL
PLATELET # BLD: 213 K/UL (ref 130–400)
PMV BLD AUTO: 11.9 FL (ref 9.4–12.4)
POTASSIUM SERPL-SCNC: 4.2 MMOL/L (ref 3.5–5)
RBC # BLD: 5.2 M/UL (ref 4.7–6.1)
SODIUM BLD-SCNC: 141 MMOL/L (ref 136–145)
TOTAL PROTEIN: 7.3 G/DL (ref 6.6–8.7)
WBC # BLD: 9.2 K/UL (ref 4.8–10.8)

## 2022-02-28 PROCEDURE — 97116 GAIT TRAINING THERAPY: CPT

## 2022-02-28 PROCEDURE — 83735 ASSAY OF MAGNESIUM: CPT

## 2022-02-28 PROCEDURE — 6370000000 HC RX 637 (ALT 250 FOR IP): Performed by: INTERNAL MEDICINE

## 2022-02-28 PROCEDURE — 97530 THERAPEUTIC ACTIVITIES: CPT

## 2022-02-28 PROCEDURE — 80053 COMPREHEN METABOLIC PANEL: CPT

## 2022-02-28 PROCEDURE — 6360000002 HC RX W HCPCS: Performed by: INTERNAL MEDICINE

## 2022-02-28 PROCEDURE — 6370000000 HC RX 637 (ALT 250 FOR IP): Performed by: UROLOGY

## 2022-02-28 PROCEDURE — 6360000002 HC RX W HCPCS: Performed by: UROLOGY

## 2022-02-28 PROCEDURE — 82947 ASSAY GLUCOSE BLOOD QUANT: CPT

## 2022-02-28 PROCEDURE — 36415 COLL VENOUS BLD VENIPUNCTURE: CPT

## 2022-02-28 PROCEDURE — 85025 COMPLETE CBC W/AUTO DIFF WBC: CPT

## 2022-02-28 PROCEDURE — 2580000003 HC RX 258: Performed by: INTERNAL MEDICINE

## 2022-02-28 PROCEDURE — 99231 SBSQ HOSP IP/OBS SF/LOW 25: CPT | Performed by: NURSE PRACTITIONER

## 2022-02-28 RX ORDER — SIMETHICONE 80 MG
80 TABLET,CHEWABLE ORAL EVERY 6 HOURS PRN
Status: DISCONTINUED | OUTPATIENT
Start: 2022-02-28 | End: 2022-02-28 | Stop reason: HOSPADM

## 2022-02-28 RX ORDER — MEROPENEM AND SODIUM CHLORIDE 1 G/50ML
1000 INJECTION, SOLUTION INTRAVENOUS EVERY 12 HOURS
Status: DISCONTINUED | OUTPATIENT
Start: 2022-02-28 | End: 2022-02-28 | Stop reason: HOSPADM

## 2022-02-28 RX ADMIN — SODIUM CHLORIDE, PRESERVATIVE FREE 10 ML: 5 INJECTION INTRAVENOUS at 09:34

## 2022-02-28 RX ADMIN — HYDROMORPHONE HYDROCHLORIDE 0.5 MG: 1 INJECTION, SOLUTION INTRAMUSCULAR; INTRAVENOUS; SUBCUTANEOUS at 10:27

## 2022-02-28 RX ADMIN — MEROPENEM AND SODIUM CHLORIDE 1000 MG: 1 INJECTION, SOLUTION INTRAVENOUS at 16:05

## 2022-02-28 RX ADMIN — SIMETHICONE 80 MG: 80 TABLET, CHEWABLE ORAL at 16:41

## 2022-02-28 RX ADMIN — DILTIAZEM HYDROCHLORIDE 120 MG: 120 CAPSULE, COATED, EXTENDED RELEASE ORAL at 09:34

## 2022-02-28 RX ADMIN — METOPROLOL SUCCINATE 25 MG: 25 TABLET, FILM COATED, EXTENDED RELEASE ORAL at 09:34

## 2022-02-28 ASSESSMENT — ENCOUNTER SYMPTOMS
ABDOMINAL DISTENTION: 0
VOMITING: 0
NAUSEA: 0
BACK PAIN: 0
ABDOMINAL PAIN: 0

## 2022-02-28 ASSESSMENT — PAIN SCALES - GENERAL: PAINLEVEL_OUTOF10: 7

## 2022-02-28 NOTE — CARE COORDINATION
Patient Information    Patient Name   Emily Tovar ([id number]) Legal Sex   Male    1950   Room Bed   0525 525-01       Patient Demographics    Address   [address] Phone   [phone] [facility])   [phone] (Work) *Preferred*   [phone] [facility]) E-mail Address   [email or web address]     Social Security Number    [facility]        Basic Information    Date Of Birth   1950 Gender Identity   Male Race   White (non-) Ethnic Group   Non- / Non  Preferred Language   English Preferred Written Language   English     Admission Information      Current Information    Attending Provider Admitting Provider Admission Type Admission Status   MD Vaibhav Castillo DO Emergency Confirmed Admission          Admission Date/Time Discharge Date Hospital Service Auth/Cert Status     12:49 PM  [facility] Unit Room/Bed    24 [facility] 5 SURG SERVICES [id number]                Admission    Complaint        PCP and Center    Primary Care Provider [address] [place] [address]             PRIMARY INSURANCE   Payor: BCBS MEDICARE Plan: Lynne ANTON*   Group Number: [facility] Type: INDEMNITY   Subscriber Name: Toni Jesus : 1950   Subscriber ID: [id number]       Pat. Rel. to Subscriber: Self       SECONDARY INSURANCE   Payor:   Plan:     Group Number:   Insurance Type:     Subscriber Name:   Subscriber :     Subscriber ID:         Pat.  Rel. to Subscriber:                Documents on File     Status Date Received Description   Documents for the Patient   Photo ID Received () 16 exp 18   Insurance Card Received () 16 BCBS Pathway PPO   ACP-Power of [employer] Not Received     Financial Assistance Program Applications Not Received     Cardiac Rehab Phase 3 Payment Not Received     Recurring Hospital Consent/HIPAA Scanned Not Received     (OLD) [facility] Physician Consent and NPP Not Received     Physician Office Consent for Treatment Not Received     ACP-Advance Directive Not Received     Financial Responsibility Form Not Received     MyChart Adult Proxy Access Not Received     MyChart Child Proxy Access Not Received     HIPAA Notice of Privacy      ACO Consent for the Release of  Confidential Alcohol or Drug Treatment Information Not Received     ACO Declining to [address] Not Received     (OLD) [facility] Physician Consent and NPP Signed () 16    Financial Responsibility Form Signed () 16    Outside Record   Outside Record   Outside [address] records, [facility] Card Received () 17 Robert Pathway X PPO   HIM EDWAR Authorization  17    HIM EDWAR Authorization  18    Insurance Card      Insurance Card Received () 18 [address] Card Received () 18    Insurance Card Received () 18    [facility] Physician Consent and NPP Signed () 18    Insurance Card Received () 18 MEDICARE AND BCBS CARDS   Photo ID Received () 18    Insurance Card Received () 18 [place] BCBS/Medicare   Insurance Card Received () 18    Photo ID Received () 18 exp 2022   Correspondence Received 18 letter to and from Dr Lanny Newby to hold coumadin and ASA for sx   Advance Directive Assessment Received 18    Insurance Card Received () 18 MEDICARE LETTER WITH NO ID    Advance Directive Assessment Received 18    Miscellaneous Received 18 MARCO   Advance Directive Assessment Received 18    Advance Directive Assessment Received 18    Correspondence Received 18 summary of care  18   Text Reminder Consent Patient Refused () 19    Email Reminder Consent Patient Refused () 19    Insurance Card Received () 19 BCBS-primary   Insurance Card Received () 19 [place]-secondary   Photo ID Received () 19 [place] 22   Patient Administration Received 21 CT Results   Patient Administration Received 21 [facility] from [email or web address]   Patient Administration Received 21 Malaika Rich REF   Patient Administration Received 21 PAST CT'S   [facility] Physician Communication Release NPP Signed 21    Text Reminder Consent Received 21    Email Reminder Consent Patient Refused 21    [facility]) Physician Consent and NPP Signed 21    Photo ID Received 21 [address]   Insurance Card Received () 21 anthem  hmo   Patient Administration Received 21 release of info   Patient Administration Received 21 past Urine Cultures   Patient Administration Received 21 jesse long ct   Patient Administration Received 21 INFUSION THERAPY   Insurance Card Received 21 NEW MEDICARE CARD   Patient Administration Received 21 LABS   Patient Administration Received 21 office notes from [address] [place] [address].   Patient Administration Received 12/15/21 INVANZ/SCRIPT   Patient Administration Received 12/15/21 [address] ORDER   Patient Administration Received 12/15/21 MED CLEARANCE REQUEST   Patient Administration Received 21 MEDICAL CLEARANCE FORM   [facility] Physician Communication Release NPP Signed (Deleted) 18    HIM Release of Information Output  12/15/21 Requested records   Documents for the [facility] Consent Treat/HIPAA Received 22    IMM - Medicare Second Copy Given to Pt      Coverage COB Information Received 22    Medicare Important Message Received 22    Medicare Important Message Received 22    EKG Scanned Received 22    Consents Received 22    Telemetry Strip Received 22 EMS Report Received 22    Blood Administration Received 22    Telemetry Strip Received 22    Consents Received 22    Insurance Authorization Received 22 Insurance Authorization   EKG Received 22 EKG 12-LEAD on the ekg interface     Medical Problems  Comment          Hospital Problem List  Date Reviewed: 2022     ICD-10-CM Priority Class Noted POA   Obstructive pyelonephritis N11.1   2022 Yes   Right ureteral calculus N20.1   2018 Yes   GUICHO (acute kidney injury) ([facility] N17.9   2022 Yes   Infected kidney cyst, right N28.1   2022 Yes   Sepsis ([facility] A41.9   2022 Yes     Non-Hospital Problem List  Date Reviewed: 2022     ICD-10-CM Priority Class Noted   ACS (acute coronary syndrome) ([facility] I24.9   2016   Overview Signed 2016  8:43 AM by Priscila Booth MD    16  ACS  BRANDO RISK Score 1 (angina), AUC indication 3, AUC score 7  16  Cath  99% 1st diagonal, 2.25 x 15 YURIDIA, anterior lateral hypokinesis, EF 45%         Family history of early CAD Z82.49   2016   Essential hypertension I10   Unknown   CAD (coronary artery disease) I25.10   Unknown   Long term current use of anticoagulant therapy Z79.01   2018   Renal calculus, right N20.0   2018   Phimosis N47.1   2018   Postoperative pain G89.18   Unknown   UTI due to extended-spectrum beta lactamase (ESBL) producing Escherichia coli N39.0, B96.29, Z16.12   2021   Renal calculus, left N20.0   2022           Order Requisition for Lori Gibson  CSN: [id number]   Order Date:  2022             Patient Information: Sharda Weems       :  1950  Age:  73 y.o.   Sex:  M  Home Phone: [phone]  Work Phone:  [phone] SSN: [id number]  Address:  [address] [place], [place] [address], [address] [place] [address] MRN:  [id number]  Facility MRN:  [id number]  PCP: Екатерина Robertson MD  PCP Phone: [phone]           Ordering Dept: [facility] 5 Surg Services     Site: [facility]  Ph: [phone] Fax: Address: [address] [facility] [place] [address] Ordering User: Lopez Jamison MD  Provider ID: [id number]  NPI:  [id number]                  Test Ordered: Inpatient consult to Social Work Antonio Bigg   Code: Wood Maldonado   ORD #: [id number]  Associated Diagnosis:   Comments: Outpatient IV antibiotic recommendation:  1. Diagnosis-ESBL positive E. coli UTI with bacteremia, obstructive pyelonephritis with abscess-he underwent urinary stenting and percutaneous perinephric abscess drainage  2. Access-PICC line  3. Antibiotic recommendation:  Ertapenem 1 g IV daily through March 16, 2022  4. Remove PICC line at the end of his antibiotic treatment     Nilo Felipe MD  Reason for Consult?  Outpatient IV antibiotic orders Priority  Routine [facility]  Hospital Performed      Order Status    Expected Date    Specimen Source    Collection Date    Collection Time    Occurrences Remaining    Interval  ONE TIME         Electronically Signed By  Lopez Jamison MD Date  Feb 28, 2022  2:51 PM              Responsible Party Rodrigo Manuel     Guar-ActID   Relationship Account Type Home Phone   Sha Pyle [phone] [facility] [address] [facility]. Self P/F [phone]   Employer   Work Phone   RETIRED   [phone]          [address] [facility]     Primary Insurance  Insurance/Subscriber ID:  [id number]  [facility] [address] Name:  Ingrid MCDONALD              Relationship to Patient: SelfSigned ABN: N    Payor Name:  Karl Hernandez   Plan:  DASHAWN MEDIBLUE ESSENTIAL/PLUS   Group: [id number]  Worker's Comp Date of Injury:

## 2022-02-28 NOTE — DISCHARGE SUMMARY
Hospitalist Discharge Summary  [facility]    Patient: Immanuel Wallace  : 1950  MRN: [id number]  Code Status: Full Code  PCP: Roberto Babin MD  Attending: Di Epperson MD  Admission Date: 2022  Discharge Date: 2022    Discharge Medications:     Current Discharge Medication List           Details   ertapenem [facility]) infusion Infuse 1,000 mg intravenously every 24 hours for 16 days Compound per protocol. Qty: 16 g, Refills: 0              Details   diphenhydrAMINE (BENADRYL) 25 MG tablet Take 25 mg by mouth nightly as needed for Sleep      dilTIAZem (CARDIZEM 12 HR) 120 MG extended release capsule Take 120 mg by mouth 2 times daily      lisinopril (PRINIVIL;ZESTRIL) 20 MG tablet Take 20 mg by mouth 2 times daily       tamsulosin (FLOMAX) 0.4 MG capsule Take 0.4 mg by mouth daily       CARTIA  MG extended release capsule TAKE 1 CAPSULE BY MOUTH EVERY DAY      warfarin (COUMADIN) 1 MG tablet Take 5 mg by mouth daily       clotrimazole-betamethasone (LOTRISONE) 1-0.05 % cream Apply topically 2 times daily. Qty: 1 Tube, Refills: 1      bacitracin-polymyxin b (POLYSPORIN) [id number] UNIT/GM ointment Apply to incision TID  Qty: 15 g, Refills: 1      betamethasone dipropionate (DIPROLENE) 0.05 % cream Apply topically 2 times daily.   Qty: 15 g, Refills: 0    Associated Diagnoses: Acquired phimosis      metoprolol succinate (TOPROL XL) 25 MG extended release tablet Take 1 tablet by mouth daily  Qty: 90 tablet, Refills: 3    Associated Diagnoses: Essential hypertension      Diphenhydramine-APAP, sleep, (TYLENOL PM EXTRA STRENGTH PO) Take 2 capsules by mouth nightly as needed             Discharge Instructions:   Recommended Follow Up:  Dora Rios MD  [address] [address]  [phone]    Schedule an appointment as soon as possible for a visit in 2 weeks  For pre-op visit/urine culture before definitive stone management after completing extended antibiotics    Ciera Damon MD  [address] 32 82 12    In 1 week  post hospital visit    Altagracia Raygoza MD  [facility]  [address] [place] [address]  [phone]    Schedule an appointment as soon as possible for a visit      Andrew Aleman, [address] [place] [address]  [phone]    Schedule an appointment as soon as possible for a visit  CKD    [facility] EMERGENCY DEPT  [facility]  [phone]  Go to  As needed, If symptoms worsen    Future Appointments Scheduled at Time of Discharge:  Future Appointments   Date Time Provider Jane Spicer   3/22/2022  2:30 PM [address] [facility] [facility] Course:   Septic shock  Resolved  Secondary to below  Agents per ID  IVF administered  Norepinephrine gtt since weaned off  Lactate improved     Right obstructive pyelonephritis/abscess  Resolved  Urology following  Right ureteral stone s/p stent placement  Right renal abscess s/p drain placement  History of ESBL E. Coli  Urine culture grew ESBL E. coli  Agents per ID  Drain DC'd 2/26/2022  Urology/ID state patient stable for discharge  ID has arranged for outpatient antibiotics  Patient aware to follow-up with urology as outpatient for definitive treatment of stone     ESBL E. coli bacteremia  Secondary to above  Agents per ID  Repeat blood cultures no growth     GUICHO/CKD 3  GUICHO resolved  Secondary to above  IVF administered  Avoid offending agents  Follow renal function/urine output/electrolytes    Patient clinically back to baseline  Patient denies any complaints currently  Patient politely adamant for discharge  Urology/ID state patient stable for discharge  Patient medically stable for discharge  Patient aware to follow-up with his outpatient providers  Patient aware to return to ER immediately with any concerning signs or symptoms    Discharge Exam:   BP (!) 164/93 Comment: pt had just returned from walking  Pulse 73   Temp 97.9 °F (36.6 °C) (Temporal)   Resp 16   Ht 6' 3\" (1.905 m)   Wt 285 lb (129.3 kg)   SpO2 97%   BMI 35.62 kg/m²     General: no acute distress  HEENT: normocephalic, atraumatic  Neck: supple, symmetrical, trachea midline   Lungs: clear to auscultation bilaterally   Cardiovascular: s1 and s2 normal  Abdomen: soft, positive bowel sounds  Extremities: no edema or cyanosis   Neuro: aaox3, no focal deficits   Skin: normal color and texture     Recent Labs     02/26/22 0339 02/27/22 0226 02/28/22 0310   WBC 7.0 6.9 9.2   HGB 11.9* 12.4* 13.1*    157 213     Recent Labs     02/26/22 0339 02/27/22 0226 02/28/22 0310    139 141   K 4.7 4.2 4.2    106 106   CO2 23 23 23   BUN 37* 33* 29*   CREATININE 1.9* 1.7* 1.7*   GLUCOSE 99 97 96     Recent Labs     02/26/22 0339 02/27/22 0226 02/28/22  0310   AST 27 19 22   ALT 39 32 34   BILITOT 0.3 0.5 0.5   ALKPHOS 67 66 72     Troponin T: No results for input(s): TROPONINI in the last 72 hours. Pro-BNP: No results for input(s): BNP in the last 72 hours. INR: No results for input(s): INR in the last 72 hours. UA:No results for input(s): NITRITE, COLORU, PHUR, LABCAST, WBCUA, RBCUA, MUCUS, TRICHOMONAS, YEAST, BACTERIA, CLARITYU, SPECGRAV, LEUKOCYTESUR, UROBILINOGEN, BILIRUBINUR, BLOODU, GLUCOSEU, AMORPHOUS in the last 72 hours. A1C: No results for input(s): LABA1C in the last 72 hours. ABG:No results for input(s): PHART, QHB5HZO, PO2ART, XDZ2UFW, BEART, HGBAE, U8TAYOPO, CARBOXHGBART in the last 72 hours.      Tao Singh MD  2/28/2022 4:54 PM

## 2022-02-28 NOTE — DISCHARGE INSTR - OTHER ORDERS
Ertapenem 1 g IV daily through March 16, 2022  4  Remove PICC line at the end of his antibiotic treatment

## 2022-02-28 NOTE — PROGRESS NOTES
Urology Progress Note    SUBJECTIVE:  No  complaints. Denies flank pain, voiding with no complications. OBJECTIVE:   Review of Systems   Constitutional: Negative for chills and fever. Gastrointestinal: Negative for abdominal distention, abdominal pain, nausea and vomiting. Genitourinary: Negative for difficulty urinating, dysuria, flank pain, frequency, hematuria and urgency. Musculoskeletal: Positive for arthralgias and gait problem. Negative for back pain. Psychiatric/Behavioral: Negative for agitation and confusion. Physical  VITALS:  BP (!) 152/66   Pulse 80   Temp 97.2 °F (36.2 °C) (Temporal)   Resp 16   Ht 6' 3\" (1.905 m)   Wt 285 lb (129.3 kg)   SpO2 95%   BMI 35.62 kg/m²   TEMPERATURE:  Current - Temp: 97.2 °F (36.2 °C); Max - Temp  Av.2 °F (36.2 °C)  Min: 96.8 °F (36 °C)  Max: 97.5 °F (36.4 °C)   24 HR I&O   Intake/Output Summary (Last 24 hours) at 2022 0728  Last data filed at 2022 0019  Gross per 24 hour   Intake 1320.97 ml   Output 400 ml   Net 920.97 ml       Physical Exam   BACK: inspection of back is normal and no tenderness in spine or flanks. Dressing to right flank clean and intact, no drainage   ABDOMEN:  soft, non-distended and non-tender  HEART:  normal rate and regular rhythm  CHEST:  Normal respiratory effort  GENITAL/URINARY:  Reardon catheter discontinued. Patient voiding well. Normal external genitalia     Data  CBC:   Recent Labs     22   WBC 7.0 6.9 9.2   HGB 11.9* 12.4* 13.1*   HCT 39.9* 40.4* 42.9    157 213     BMP:    Recent Labs     22    139 141   K 4.7 4.2 4.2    106 106   CO2 23 23 23   BUN 37* 33* 29*   CREATININE 1.9* 1.7* 1.7*   GLUCOSE 99 97 96       No results for input(s): LABURIN in the last 72 hours. No results for input(s): BC in the last 72 hours. No results for input(s): Caity Trivedi in the last 72 hours.       U/A:    Lab Results   Component Value Date    NITRITE neg 09/22/2021    COLORU YELLOW 02/22/2022    PHUR 5.5 02/22/2022    WBCUA TNTC 02/22/2022    RBCUA 11-15 02/22/2022    YEAST 0 12/08/2021    BACTERIA 3+ 02/22/2022    CLARITYU TURBID 02/22/2022    SPECGRAV 1.014 02/22/2022    LEUKOCYTESUR LARGE 02/22/2022    UROBILINOGEN 1.0 02/22/2022    BILIRUBINUR Negative 02/22/2022    BILIRUBINUR neg 09/22/2021    BLOODU MODERATE 02/22/2022    GLUCOSEU Negative 02/22/2022       Radiology:   CT ABDOMEN PELVIS WO CONTRAST Additional Contrast? None    Result Date: 2/25/2022  1. Almost complete drainage of the infected left renal cyst. 2. The percutaneous drainage catheter remains in good position. Signed by Dr Justo Cooks    Result Date: 2/23/2022  CT guidance was utilized during placement of an 8 Green[place]ic pigtail catheter into a fluid collection arising from or adjacent to the lower pole right kidney. The procedure was performed without difficulty or immediate complication, as above. Signed by Dr Román George    Result Date: 2/22/2022  1. Very low probability of pulmonary emboli. Signed by Dr Janey Rodriguez    Result Date: 2/22/2022  1. There is a 5 mm stone within the right proximal to mid ureter positioned at the L4 level creating mild to moderate right-sided hydronephrosis. 2. There is an increasing size cystic lesion/collection within the inferior pole of the right kidney at the site of a prior 2.6 cm renal cyst now measuring 4.5 cm containing few air bubbles with adjacent perinephric and right psoas stranding concerning for an infected right renal cyst. 3. Hepatic steatosis. 4. Atherosclerotic changes of the aorta with ectasia to measurement of 3.2 cm Signed by Dr Danielle Mcgee    XR CHEST PORTABLE    Result Date: 2/22/2022  1. No acute disease.  Signed by Dr Benja Kuo    Patient Active Problem List Diagnosis    [facility] syndrome) [facility]    Family history of early CAD    Essential hypertension    CAD (coronary artery disease)    Long term current use of anticoagulant therapy    Renal calculus, right    Phimosis    Right ureteral calculus    Postoperative pain    UTI due to extended-spectrum beta lactamase (ESBL) producing Escherichia coli    Renal calculus, left    Obstructive pyelonephritis    GUICHO (acute kidney injury) ([facility]    Infected kidney cyst, right    Sepsis ([facility]     1. Sepsis secondary to obstructive pyelonephritis from right ureteral calculus status post stent placement. He is overall clinically improved, afebrile, no leukocytosis. Will require IV antibiotics planned at [facility] as an outpatient. Culture positive for ESBL. Appreciate ID recommendations. 2. Infected right renal cyst status post percutaneous drainage. Drain removed on 2/26/22. No leukocytosis or fevers. Can removed dressing at discharge. 3. Obstructing right ureteral calculus status post stent placement. Will need to follow up in office 2 weeks to plan definitive treatment of stone with ureteroscopy after completion of a total of 3 wks of culture specific antibiotics. Reardon catheter removed, urinating with no complications. 4. GUICHO. Overall improved now stable with creatinine of 1.7. Urology will sign off. Patient needs to follow up in the office 2 wks for definitive treatment of stone.      SANTI Hickman - CNP  2/28/2022 7:28 AM

## 2022-02-28 NOTE — CARE COORDINATION
Patient has requested for Outpatient IV infusion at [facility]. I spoke with Kiki Edge and faxed information needed for patient to go there on 03/01/2022.  Kiki Edge will call patient or spouse what time he needs to go to Outpatient Infusion at [address] [place] [address] Outpatient Infusion  # [phone]  Fax # [phone]    Electronically signed by Julia Comer RN, BSN on 2/28/2022 at 3:17 PM

## 2022-02-28 NOTE — PROGRESS NOTES
Infectious Diseases Progress Note    Patient:  Oli Clark  YOB: 1950  MRN: [id number]   Admit date: 2/22/2022   Admitting Physician: Renée Grace MD  Primary Care Physician: Jozef Beckett MD    Chief Complaint/Interval History: He feels okay. No new symptoms. No fever. Hemodynamically stable. No rash or skin itching. In/Out    Intake/Output Summary (Last 24 hours) at 2/28/2022 1446  Last data filed at 2/28/2022 1105  Gross per 24 hour   Intake 1050.97 ml   Output 100 ml   Net 950.97 ml     Allergies: Allergies   Allergen Reactions    Codeine Other (See Comments)     Describes it as a sensitivity. Current Meds: simethicone (MYLICON) chewable tablet 80 mg, Q6H PRN  docusate sodium (COLACE) capsule 100 mg, BID  senna (SENOKOT) tablet 8.6 mg, Nightly  lidocaine PF 1 % injection 5 mL, Once  sodium chloride flush 0.9 % injection 5-40 mL, 2 times per day  sodium chloride flush 0.9 % injection 5-40 mL, PRN  0.9 % sodium chloride infusion, PRN  melatonin capsule 10 mg, Nightly  lactated ringers infusion, Continuous  0.9 % sodium chloride infusion, PRN  HYDROmorphone HCl PF (DILAUDID) injection 0.5 mg, Q3H PRN  glucagon (rDNA) injection 1 mg, PRN  dextrose 5 % solution, PRN  dextrose bolus (hypoglycemia) 10% 125 mL, PRN   Or  dextrose bolus (hypoglycemia) 10% 250 mL, PRN  glucose chewable tablet 4 each, PRN  dilTIAZem (CARDIZEM CD) extended release capsule 120 mg, Daily  metoprolol succinate (TOPROL XL) extended release tablet 25 mg, Daily  sodium chloride flush 0.9 % injection 5-40 mL, 2 times per day  sodium chloride flush 0.9 % injection 5-40 mL, PRN  0.9 % sodium chloride infusion, PRN  enoxaparin (LOVENOX) injection 40 mg, Daily  acetaminophen (TYLENOL) tablet 650 mg, Q6H PRN   Or  acetaminophen (TYLENOL) suppository 650 mg, Q6H PRN  meropenem (MERREM) 1000 mg in sodium chloride 0.9% 50 mL (duplex), Q24H  melatonin capsule 10 mg, Nightly PRN      Review of Systems see HPI.   No cardiopulmonary complaints. VitalSigns:  BP (!) 150/82   Pulse 80   Temp 97.3 °F (36.3 °C) (Temporal)   Resp 18   Ht 6' 3\" (1.905 m)   Wt 285 lb (129.3 kg)   SpO2 94%   BMI 35.62 kg/m²      Physical Exam  Line/IV (left arm PICC) site: No erythema, warmth, induration, or tenderness. Abdomen soft and nontender  No suprapubic or flank tenderness  General alert, oriented, no distress    Lab Results:  CBC:   Recent Labs     02/26/22 0339 02/27/22 0226 02/28/22 0310   WBC 7.0 6.9 9.2   HGB 11.9* 12.4* 13.1*    157 213     BMP:  Recent Labs     02/26/22 0339 02/27/22 0226 02/28/22 0310    139 141   K 4.7 4.2 4.2    106 106   CO2 23 23 23   BUN 37* 33* 29*   CREATININE 1.9* 1.7* 1.7*   GLUCOSE 99 97 96     CultureResults:    Blood cultures February 22, 2022:  Escherichia coli (esbl)       BACTERIAL SUSCEPTIBILITY PANEL BY ARAMIS     ampicillin >=32 mcg/mL Resistant     ampicillin-sulbactam >=32 mcg/mL Resistant     aztreonam  Resistant     ceFAZolin >=64 mcg/mL Resistant     cefepime  Resistant     cefTRIAXone >=64 mcg/mL Resistant     ertapenem <=0.5 mcg/mL Sensitive     gentamicin <=1 mcg/mL Sensitive     levofloxacin >=8 mcg/mL Resistant     meropenem <=0.25 mcg/mL Sensitive     trimethoprim-sulfamethoxazole >=320 mcg/mL Resistant      Urine cultures February 22, 2022:  Escherichia coli (esbl)       BACTERIAL SUSCEPTIBILITY PANEL BY ARAMIS     ampicillin >=32 mcg/mL Resistant     ampicillin-sulbactam >=32 mcg/mL Resistant     aztreonam  Resistant     ceFAZolin >=64 mcg/mL Resistant     cefepime  Resistant     cefTRIAXone >=64 mcg/mL Resistant     ertapenem <=0.5 mcg/mL Sensitive     gentamicin <=1 mcg/mL Sensitive     levofloxacin >=8 mcg/mL Resistant     meropenem <=0.25 mcg/mL Sensitive     nitrofurantoin <=16 mcg/mL Sensitive     trimethoprim-sulfamethoxazole >=320 mcg/mL Resistant      Radiology: None    Additional Studies Reviewed:  None    Impression:  1.   ESBL positive E. coli UTI with bacteremia  2. Obstructive pyelonephritis with abscess  3. Nephrolithiasis  4. Renal insufficiency-improved with creatinine at 1.7-we will need adjustment in antibiotic treatment    Recommendations:  Discussed with   They are planning for him to go to [facility] daily to complete his antibiotic treatment  As we would need to dose meropenem at this point every 12 hours, will transition to ertapenem for once daily dosing  See outpatient antibiotic recommendations below    Outpatient IV antibiotic recommendation:  1. Diagnosis-ESBL positive E. coli UTI with bacteremia, obstructive pyelonephritis with abscess-he underwent urinary stenting and percutaneous perinephric abscess drainage  2. Access-PICC line  3. Antibiotic recommendation:  Ertapenem 1 g IV daily through March 16, 2022  4.   Remove PICC line at the end of his antibiotic treatment    Ab Hameed MD

## 2022-02-28 NOTE — PROGRESS NOTES
Occupational Therapy     02/28/22 1041   Restrictions/Precautions   Restrictions/Precautions Fall Risk;Contact Precautions   Position Activity Restriction   Other position/activity restrictions hx ESBL 12/21   Vision   Vision Impaired   Vision Exceptions Wears glasses for reading   Hearing   Hearing [facility]   General   Chart Reviewed Yes   Patient assessed for rehabilitation services? Yes   Response to previous treatment Patient with no complaints from previous session   Family / Caregiver Present No   Diagnosis Pyelonephritis   Subjective   Subjective Pt sitting EOB upon arrival for therapy. Pt states \"I want to go home today\". Pain Assessment   Patient Currently in Pain No   Orientation   Overall Orientation Status WNL   ADL   Feeding Independent   Grooming Independent   UE Bathing Supervision   LE Bathing Minimal assistance   UE Dressing Supervision   LE Dressing Minimal assistance   Toileting Contact guard assistance   Additional Comments Spouse can assist with LB ADLs if needed   Balance   Sitting Balance Independent   Standing Balance Stand by assistance   Functional Mobility   Functional - Mobility Device Rolling Walker   Activity Other  (in hallway 200 ft)   Assist Level Stand by assistance   Functional Mobility Comments assisted with IV pole    Bed mobility   Supine to Sit Supervision   Sit to Supine Supervision   Scooting Supervision   Transfers   Sit to stand Contact guard assistance   Stand to sit Contact guard assistance   Assessment   Performance deficits / Impairments Decreased endurance;Decreased functional mobility ; Decreased ADL status; Decreased balance   Assessment Will progress as tolerated   Treatment Diagnosis Pyelonephritis   Prognosis Good   REQUIRES OT FOLLOW UP Yes   Treatment Initiated  Tx focused on functional mobility at RW level this date. Pt has wife to assist with LBD at home.     Discharge Recommendations Home with assist PRN   Activity Tolerance   Activity Tolerance Patient Tolerated treatment well   Safety Devices   Safety Devices in place Yes   Type of devices Call light within reach; Left in bed   Plan   Times per week 3-5x/week   Times per day Daily   Electronically signed by TAPAN Nelson on 2/28/2022 at 10:45 AM

## 2022-02-28 NOTE — PROGRESS NOTES
Physical Therapy  Name: Leida Birmingham  MRN:  [id number]  Date of service:  2/28/2022 02/28/22 1013   Restrictions/Precautions   Restrictions/Precautions Fall Risk;Contact Precautions   General   Chart Reviewed Yes   Subjective   Subjective Pt sitting EOB, agrees to work with therapy. Pain Screening   Patient Currently in Pain Denies   Transfers   Sit to Stand Stand by assistance   Stand to sit Stand by assistance   Ambulation   Ambulation? Yes   Ambulation 1   Surface level tile   Device No Device;Rolling Walker   Assistance Contact guard assistance   Gait Deviations Slow Lorie   Distance 10' with no AD, 250' with rwx   Short term goals   Time Frame for Short term goals 2 wks   Short term goal 1 supine to sit indep   Short term goal 2 sit to stand indep   Short term goal 3 amb. 200' with RW SBA   Conditions Requiring Skilled Therapeutic Intervention   Body structures, Functions, Activity limitations Decreased functional mobility ; Decreased strength;Decreased balance; Increased pain   Assessment Pt able to tolerate increased amb distance this tx with rwx, steady overall with no LOB noted, able to amb short distance in room with no AD. Pt returned to EOB sitting post gait.    Activity Tolerance   Activity Tolerance Patient Tolerated treatment well   Safety Devices   Type of devices Call light within reach;Gait belt;Left in bed         Electronically signed by Val Schrader PTA on 2/28/2022 at 11:29 AM

## 2022-03-01 ENCOUNTER — TELEPHONE (OUTPATIENT)
Dept: UROLOGY | Age: 72
End: 2022-03-01

## 2022-03-01 NOTE — TELEPHONE ENCOUNTER
Patient's wife calling to set up a 2 week post op to get stent removed. [facility] couldn't accommodate. Please call to schedule.

## 2022-03-01 NOTE — TELEPHONE ENCOUNTER
Patient needs to follow up in the office 2 wks for definitive treatment of stone with office follow up, not stent removal, correct?

## 2022-03-02 ENCOUNTER — TELEPHONE (OUTPATIENT)
Dept: UROLOGY | Age: 72
End: 2022-03-02

## 2022-03-02 NOTE — TELEPHONE ENCOUNTER
Tried to contact Pts spouse after our phone encounter earlier. No answer and no VM was set up to leave a message for patient or spouse. Provider said kidney function was low and no prescript strength could be given. Provider did suggest OTC Azo. Have tried to contact several times and yet to have a call back from spouse.

## 2022-03-02 NOTE — TELEPHONE ENCOUNTER
Pts spouse called the office asking about medication for discomfort (burning) after stent was put in last week in [place]. We explained the discomfort was normal and would speak to provider about possible measures to take and we would give them a call back.

## 2022-03-07 ENCOUNTER — TELEPHONE (OUTPATIENT)
Dept: UROLOGY | Age: 72
End: 2022-03-07

## 2022-03-07 NOTE — TELEPHONE ENCOUNTER
Pts wife called stating that patient has been very weak since cysto, and losing lots of weight. Would like to see what Dr. Sachin Arguelles suggests he should do. Pt had a cysto with ureteral stent on 2/22.  Pt has a FU with Mary Kang, 3/15

## 2022-03-07 NOTE — TELEPHONE ENCOUNTER
Tried to reach Pt to let them know as long as no fever that Provider would like Pt to finish antibiotics before treating stone. If Pt would like to be seen sooner than FU 3/15/22 we can schedule that.

## 2022-03-13 NOTE — H&P
Spec Grav, UA 1.020       Blood, UA POC neg       pH, UA 7.5       Protein, UA POC trace       Urobilinogen, UA 4.0       Leukocytes, UA neg       Nitrite, UA neg                 IMAGING:   CT stone protocol completed 8/21/2018 radiologist's interpretation  1. Right obstructive uropathy secondary to 6 to 7 mm calculus on image   125.   2. Bilateral nonobstructing calculi. 3. Fat-containing left inguinal hernia.       KUB completed 8/1/2018  Shows a stone in the right renal pelvis/UPJ which measured around 7 mm.        Assessment/ Plan     Diagnosis Orders   1. Right ureteral calculus  tamsulosin (FLOMAX) 0.4 MG capsule   2. Retention of urine  MS MEASUREMENT,POST-VOID RESIDUAL VOLUME BY US,NON-IMAGING     POCT Urinalysis No Micro (Auto)     CT ABDOMEN PELVIS WO CONTRAST Additional Contrast? None   3. History of renal stone  CT ABDOMEN PELVIS WO CONTRAST Additional Contrast? None   4. Acquired phimosis  clotrimazole-betamethasone (LOTRISONE) 1-0.05 % lotion    Case discussed with Dr. Anshul Mahajan. Mr. Higinio Sy will be placed on the surgery schedule for this afternoon. Mr. gunn will undergo cystoscopy, right ureteroscopy, laser lithotripsy, with stone removal and placement of double J stent; as well as a dorsal slit. He will also be scheduled for ESWL on Tuesday, August 28, 2018.        Electronically signed by SANTI Salazar on 8/21/2018 at 2:28 PM          Office Visit on 8/21/2018            Detailed Report           Note shared with patient   Progress Notes Info     Author Note Status Last Update User   SANTI Salazar Signed SANTI Salazar   Last Update Date/Time: 8/21/2018  3:24 PM   Chart Review Routing History     Routing history could not be found for this note. This is because the note has never been routed or because communication record creation was suppressed. Attending Attestation (For Attendings USE Only)...

## 2022-03-15 ENCOUNTER — OFFICE VISIT (OUTPATIENT)
Dept: UROLOGY | Age: 72
End: 2022-03-15
Payer: MEDICARE

## 2022-03-15 VITALS
BODY MASS INDEX: 32.58 KG/M2 | WEIGHT: 262 LBS | DIASTOLIC BLOOD PRESSURE: 49 MMHG | HEIGHT: 75 IN | SYSTOLIC BLOOD PRESSURE: 131 MMHG | TEMPERATURE: 97.9 F

## 2022-03-15 DIAGNOSIS — Z16.12 UTI DUE TO EXTENDED-SPECTRUM BETA LACTAMASE (ESBL) PRODUCING ESCHERICHIA COLI: ICD-10-CM

## 2022-03-15 DIAGNOSIS — N39.0 UTI DUE TO EXTENDED-SPECTRUM BETA LACTAMASE (ESBL) PRODUCING ESCHERICHIA COLI: ICD-10-CM

## 2022-03-15 DIAGNOSIS — N28.1 RENAL CYST, RIGHT: ICD-10-CM

## 2022-03-15 DIAGNOSIS — N28.9 ABNORMAL RENAL FUNCTION: Primary | ICD-10-CM

## 2022-03-15 DIAGNOSIS — N20.1 RIGHT URETERAL CALCULUS: ICD-10-CM

## 2022-03-15 DIAGNOSIS — B96.29 UTI DUE TO EXTENDED-SPECTRUM BETA LACTAMASE (ESBL) PRODUCING ESCHERICHIA COLI: ICD-10-CM

## 2022-03-15 LAB
BACTERIA URINE, POC: 0
BILIRUBIN URINE: 0 MG/DL
BLOOD, URINE: POSITIVE
CASTS URINE, POC: 0
CLARITY: CLEAR
COLOR: YELLOW
CRYSTALS URINE, POC: 0
EPI CELLS URINE, POC: 0
GLUCOSE URINE: ABNORMAL
KETONES, URINE: NEGATIVE
LEUKOCYTE EST, POC: ABNORMAL
NITRITE, URINE: NEGATIVE
PH UA: 7 (ref 4.5–8)
PROTEIN UA: POSITIVE
RBC URINE, POC: 8
SPECIFIC GRAVITY UA: 1.02 (ref 1–1.03)
UROBILINOGEN, URINE: NORMAL
WBC URINE, POC: 0
YEAST URINE, POC: 0

## 2022-03-15 PROCEDURE — 81001 URINALYSIS AUTO W/SCOPE: CPT | Performed by: NURSE PRACTITIONER

## 2022-03-15 PROCEDURE — 99214 OFFICE O/P EST MOD 30 MIN: CPT | Performed by: NURSE PRACTITIONER

## 2022-03-15 NOTE — PROGRESS NOTES
Pattie Osorio is a 73 y.o. male who presents today   Chief Complaint   Patient presents with    Follow-up     I am here today for a hfu to [facility] surgery       Patient 60-year-old male presents clinic today for follow-up. He has a long history of stones as well as recurrent infections. History of recent left renal ESWL on 1/6/2022. Recently hospitalized with sepsis secondary to obstructive pyelonephritis from right ureteral calculus and infected right renal cyst on 1/22/2022. He did undergo percutaneous drainage of the cyst drain was removed on 1/46/9640 with no complications. He continues to have an obstructing right ureteral calculus status post stent placement on 2/22/2022. He has undergone IV antibiotics and currently has 2 days remaining. Currently on Invanz. These are being done at [facility] as an outpatient. Patient continues to have some weakness although denies any flank pain, fever, chills, nausea, vomiting, lower urinary tract symptoms. Previous creatinine obtained on 2/28/2022 was 1.7, GFR 40. He comes in today for definitive treatment of his stone.       Past Medical History:   Diagnosis Date    ACS (acute coronary syndrome) ([address] 4/27/2016 4/27/16  ACS  BRANDO RISK Score 1 (angina), AUC indication 3, AUC score 7 4/27/16  Cath  99% 1st diagonal, 2.25 x 15 YURIDIA, anterior lateral hypokinesis, EF 45%     CAD (coronary artery disease)     has seen dr. Wilmer Carvalho in the past    Cerebral artery occlusion with cerebral infarction ([address] 1998    Difficulty voiding     per pt c/o.     Hyperlipidemia     Hypertension 4/27/2016    Kidney stone     MI (myocardial infarction) ([address]     stent    Renal calculus, right 8/1/2018    UTI (urinary tract infection)     due to stones; pt has been taking iv antibiotics at home; 1/5/22 last dose       Past Surgical History:   Procedure Laterality Date    CARDIAC CATHETERIZATION      COLONOSCOPY      CYSTOSCOPY Right 2/22/2022    CYSTOSCOPY ,URETEROSCOPY WITH URETERAL STENT INSERTION performed by Mahsa Lane MD at [address] / Katlyn Tariq / Ronan Doty Right 8/28/2018    CYSTOSCOPY STENT REMOVAL performed by Mahsa Lane MD at [address] [facility] [address] LITHOTRIPSY Left 1/6/2022    LEFT RENAL EXTRACORPOREAL SHOCK WAVE LITHOTRIPSY performed by Mahsa Lane MD at [facility] CIRCUMCISION,OTHER,28+ D/O N/A 8/21/2018    DORSAL SLIT performed by Mahsa Lane MD at [address] &INDWELL STENT INSRT Right 8/21/2018    CYSTOSCOPY URETEROSCOPY LASER LITHOTRIPSY performed by Mahsa Lane MD at [facility] ESWL Right 8/28/2018    ESWL EXTRACORPEAL SHOCK WAVE LITHOTRIPSY performed by Mahsa Lane MD at [facility] OR       Current Outpatient Medications   Medication Sig Dispense Refill    ertapenem [facility]) infusion Infuse 1,000 mg intravenously every 24 hours for 16 days Compound per protocol.  (Patient not taking: Reported on 3/15/2022) 16 g 0    diphenhydrAMINE (BENADRYL) 25 MG tablet Take 25 mg by mouth nightly as needed for Sleep (Patient not taking: Reported on 3/15/2022)      dilTIAZem (CARDIZEM 12 HR) 120 MG extended release capsule Take 120 mg by mouth 2 times daily (Patient not taking: Reported on 3/15/2022)      lisinopril (PRINIVIL;ZESTRIL) 20 MG tablet Take 20 mg by mouth 2 times daily  (Patient not taking: Reported on 3/15/2022)      tamsulosin (FLOMAX) 0.4 MG capsule Take 0.4 mg by mouth daily  (Patient not taking: Reported on 3/15/2022)      CARTIA  MG extended release capsule TAKE 1 CAPSULE BY MOUTH EVERY DAY (Patient not taking: Reported on 12/8/2021)      warfarin (COUMADIN) 1 MG tablet Take 5 mg by mouth daily  (Patient not taking: Reported on 3/15/2022)      metoprolol succinate (TOPROL XL) 25 MG extended release tablet Take 1 tablet by mouth daily (Patient not taking: Reported on 3/15/2022) 90 tablet 3    Diphenhydramine-APAP, sleep, (TYLENOL PM EXTRA STRENGTH PO) Take 2 capsules by mouth nightly as needed  (Patient not taking: Reported on 3/15/2022)       No current facility-administered medications for this visit. Allergies   Allergen Reactions    Codeine Other (See Comments)     Describes it as a sensitivity. Social History     Socioeconomic History    Marital status:      Spouse name: jessica gunn    Number of children: 2    Years of education: None    Highest education level: None   Occupational History    Occupation: farmer    Tobacco Use    Smoking status: Former Smoker     Packs/day: 3.00     Years: 35.00     Pack years: 105.00    Smokeless tobacco: Former User     Quit date: 4/27/2001   Vaping Use    Vaping Use: Never used   Substance and Sexual Activity    Alcohol use: No    Drug use: No    Sexual activity: Yes     Partners: Female   Other Topics Concern    None   Social History Narrative    None     Social Determinants of Health     Financial Resource Strain:     Difficulty of Paying Living Expenses: Not on file   Food Insecurity:     Worried About Running Out of Food in the Last Year: Not on file    Lilian of Food in the Last Year: Not on file   Transportation Needs:     Lack of Transportation (Medical): Not on file    Lack of Transportation (Non-Medical):  Not on file   Physical Activity:     Days of Exercise per Week: Not on file    Minutes of Exercise per Session: Not on file   Stress:     Feeling of Stress : Not on file   Social Connections:     Frequency of Communication with Friends and Family: Not on file    Frequency of Social Gatherings with Friends and Family: Not on file    Attends Protestant Services: Not on file    Active Member of Clubs or Organizations: Not on file    Attends Club or Organization Meetings: Not on file    Marital Status: Not on file   Intimate Partner Violence:     Fear of Current or Ex-Partner: Not on file    Emotionally Abused: Not on file    Physically Abused: Not on file    Sexually Abused: Not on file   Housing Stability:     Unable to Pay for Housing in the Last Year: Not on file    Number of Places Lived in the Last Year: Not on file    Unstable Housing in the Last Year: Not on file       Family History   Problem Relation Age of Onset    Cancer Sister     Cancer Brother     Heart Disease Father        REVIEW OF SYSTEMS:  Review of Systems   Constitutional: Positive for fatigue. Negative for chills and fever. Gastrointestinal: Negative for abdominal distention, abdominal pain, nausea and vomiting. Genitourinary: Negative for difficulty urinating, dysuria, flank pain, frequency, hematuria and urgency. Musculoskeletal: Negative for back pain and gait problem. Psychiatric/Behavioral: Negative for agitation and confusion. PHYSICAL EXAM:  BP (!) 131/49   Temp 97.9 °F (36.6 °C) (Temporal)   Ht 6' 3\" (1.905 m)   Wt 262 lb (118.8 kg)   BMI 32.75 kg/m²   Physical Exam  Vitals and nursing note reviewed. Constitutional:       General: He is not in acute distress. Appearance: Normal appearance. He is not ill-appearing. Pulmonary:      Effort: Pulmonary effort is normal. No respiratory distress. Abdominal:      General: There is no distension. Tenderness: There is no abdominal tenderness. There is no right CVA tenderness or left CVA tenderness. Neurological:      Mental Status: He is alert and oriented to person, place, and time. Mental status is at baseline. Motor: Weakness present.    Psychiatric:         Mood and Affect: Mood normal.         Behavior: Behavior normal.       DATA:  CBC with Differential:    Lab Results   Component Value Date    WBC 9.2 02/28/2022    RBC 5.20 02/28/2022    HGB 13.1 02/28/2022    HCT 42.9 02/28/2022     02/28/2022    MCV 82.5 02/28/2022    MCH 25.2 02/28/2022    MCHC 30.5 02/28/2022    RDW 13.5 02/28/2022    BANDSPCT 13 02/23/2022    LYMPHOPCT 15.0 02/28/2022    MONOPCT 8.2 02/28/2022    BASOPCT 0.4 02/28/2022    MONOSABS 0.80 02/28/2022    LYMPHSABS 1.4 02/28/2022    EOSABS 0.30 02/28/2022    BASOSABS 0.00 02/28/2022     CMP:    Lab Results   Component Value Date     02/28/2022    K 4.2 02/28/2022    K 4.9 02/24/2022     02/28/2022    CO2 23 02/28/2022    BUN 29 02/28/2022    CREATININE 1.7 02/28/2022    GFRAA 48 02/28/2022    LABGLOM 40 02/28/2022    GLUCOSE 96 02/28/2022    PROT 7.3 02/28/2022    LABALBU 3.1 02/28/2022    CALCIUM 9.1 02/28/2022    BILITOT 0.5 02/28/2022    ALKPHOS 72 02/28/2022    AST 22 02/28/2022    ALT 34 02/28/2022     Results for orders placed or performed in visit on 03/15/22   POCT Urinalysis Dipstick w/ Micro (Auto)   Result Value Ref Range    Color, UA Yellow     Clarity, UA Clear Clear    Glucose, Ur NEG     Bilirubin Urine 0 mg/dL    Ketones, Urine Negative     Specific Gravity, UA 1.025 1.005 - 1.030    Blood, Urine Positive     pH, UA 7 4.5 - 8.0    Protein, UA Positive (A) Negative    Nitrite, Urine Negative     Leukocytes, UA TRACE     Urobilinogen, Urine Normal     rbc urine, poc 8     wbc urine, poc 0     bacteria urine, poc 0     yeast urine, poc 0     casts urine, poc 0     Epi Cells Urine, POC 0     crystals urine, poc 0      Lab Results   Component Value Date    PSA 0.17 09/15/2021       1. Abnormal renal function  We will send a referral to nephrology for evaluation of renal function. Patient's wife is present today and states they needed a hospital follow-up but also required a referral.    - External Referral To Nephrology  - POCT Urinalysis Dipstick w/ Micro (Auto)    2. UTI due to extended-spectrum beta lactamase (ESBL) producing Escherichia coli  Urine and blood cultures obtained on 2/22/2022 revealed E. coli ESBL. Patient currently on 3 weeks of IV antibiotics. He has 2 doses of Invanz remaining. 3. Renal cyst, right  Status post infected right renal cyst.  Denies any flank pain today. Puncture site healed.   Will reevaluate in approximately 6 weeks with CT. 4. Right ureteral calculus  Currently the patient is not on any type of anticoagulant. Was previously on a Lovenox bridge. At this time has not been on any anticoagulants. Status post right ureteral stent placement for obstructing right ureteral calculus. Comes in today to schedule definitive treatment of stone. Patient has 2 days left of IV antibiotics therefore we will schedule him for next week to undergo cystoscopy right ureteral stent removal right ureteroscopy laser lithotripsy stone basket extraction right ureteral stent replacement. We have discussed other modalities of treatment, including but not limited to a trial of medical expulsion therapy, ESWL, and monitoring the stone with subsequent imaging. Risks of the procedure were discussed which include but are not limited to bleeding, infection, postprocedural pain, damage to the kidney, ureter, bladder, or urethra, stricturing or perforation of the ureter which may require additional procedures, pain arising from the postoperative stent, failure to remove the stone, etc.    Orders Placed This Encounter   Procedures    External Referral To Nephrology     Referral Priority:   Routine     Referral Type:   Eval and Treat     Referral Reason:   Specialty Services Required     Referred to Provider:   Celso Matta MD     Requested Specialty:   Nephrology     Number of Visits Requested:   1    POCT Urinalysis Dipstick w/ Micro (Auto)        No follow-ups on file. All information inputted into the note by the MA to include chief complaint, past medical history, past surgical history, medications, allergies, social and family history and review of systems has been reviewed and updated as needed by me. EMR Dragon/transcription disclaimer: Much of this documentt is electronic  transcription/translation of spoken language to printed text.  The  electronic translation of spoken language may be erroneous, or at times,  nonsensical words or phrases may be inadvertently transcribed.  Although I  have reviewed the document for such errors, some may still exist.

## 2022-03-16 ENCOUNTER — PREP FOR PROCEDURE (OUTPATIENT)
Dept: UROLOGY | Age: 72
End: 2022-03-16

## 2022-03-16 ASSESSMENT — ENCOUNTER SYMPTOMS
ABDOMINAL PAIN: 0
NAUSEA: 0
VOMITING: 0
BACK PAIN: 0
NAUSEA: 0
ABDOMINAL DISTENTION: 0
ABDOMINAL PAIN: 0
VOMITING: 0
BACK PAIN: 0
ABDOMINAL DISTENTION: 0

## 2022-03-16 NOTE — H&P
Nico Portillo is a 73 y.o. male who presents today   No chief complaint on file. Patient 57-year-old male presents clinic today for follow-up. He has a long history of stones as well as recurrent infections. History of recent left renal ESWL on 1/6/2022. Recently hospitalized with sepsis secondary to obstructive pyelonephritis from right ureteral calculus and infected right renal cyst on 1/22/2022. He did undergo percutaneous drainage of the cyst drain was removed on 9/31/9134 with no complications. He continues to have an obstructing right ureteral calculus status post stent placement on 2/22/2022. He has undergone IV antibiotics and currently has 2 days remaining. Currently on Invanz. These are being done at [facility] as an outpatient. Patient continues to have some weakness although denies any flank pain, fever, chills, nausea, vomiting, lower urinary tract symptoms. Previous creatinine obtained on 2/28/2022 was 1.7, GFR 40. He comes in today for definitive treatment of his stone.       Past Medical History:   Diagnosis Date    ACS (acute coronary syndrome) ([facility] 4/27/2016 4/27/16  ACS  BRANDO RISK Score 1 (angina), AUC indication 3, AUC score 7 4/27/16  Cath  99% 1st diagonal, 2.25 x 15 YURIDIA, anterior lateral hypokinesis, EF 45%     CAD (coronary artery disease)     has seen dr. Latrell Weber in the past    Cerebral artery occlusion with cerebral infarction ([address] 1998    Difficulty voiding     per pt c/o.     Hyperlipidemia     Hypertension 4/27/2016    Kidney stone     MI (myocardial infarction) ([facility]     stent    Renal calculus, right 8/1/2018    UTI (urinary tract infection)     due to stones; pt has been taking iv antibiotics at home; 1/5/22 last dose       Past Surgical History:   Procedure Laterality Date    CARDIAC CATHETERIZATION      COLONOSCOPY      CYSTOSCOPY Right 2/22/2022    CYSTOSCOPY ,URETEROSCOPY WITH URETERAL STENT INSERTION [facility]ru Walter [facility]    CYSTOSCOPY INSERTION / REMOVAL STENT / STONE Right 8/28/2018    CYSTOSCOPY STENT REMOVAL performed by Lonnie Bai MD at [address] LITHOTRIPSY Left 1/6/2022    LEFT RENAL EXTRACORPOREAL SHOCK WAVE LITHOTRIPSY performed by Lonnie Bai MD at [address] CIRCUMCISION,OTHER,28+ D/O N/A 8/21/2018    DORSAL SLIT performed by Lonnie Bai MD at [address] &INDWELL STENT INSRT Right 8/21/2018    CYSTOSCOPY URETEROSCOPY LASER LITHOTRIPSY [facility] [address] ESWL Right 8/28/2018    ESWL EXTRACORPEAL SHOCK WAVE LITHOTRIPSY performed by Lonnie Bai MD at [address] [facility] OR       Current Outpatient Medications   Medication Sig Dispense Refill    ertapenem [facility]) infusion Infuse 1,000 mg intravenously every 24 hours for 16 days Compound per protocol.  (Patient not taking: Reported on 3/15/2022) 16 g 0    diphenhydrAMINE (BENADRYL) 25 MG tablet Take 25 mg by mouth nightly as needed for Sleep (Patient not taking: Reported on 3/15/2022)      dilTIAZem (CARDIZEM 12 HR) 120 MG extended release capsule Take 120 mg by mouth 2 times daily (Patient not taking: Reported on 3/15/2022)      lisinopril (PRINIVIL;ZESTRIL) 20 MG tablet Take 20 mg by mouth 2 times daily  (Patient not taking: Reported on 3/15/2022)      tamsulosin (FLOMAX) 0.4 MG capsule Take 0.4 mg by mouth daily  (Patient not taking: Reported on 3/15/2022)      CARTIA  MG extended release capsule TAKE 1 CAPSULE BY MOUTH EVERY DAY (Patient not taking: Reported on 12/8/2021)      warfarin (COUMADIN) 1 MG tablet Take 5 mg by mouth daily  (Patient not taking: Reported on 3/15/2022)      metoprolol succinate (TOPROL XL) 25 MG extended release tablet Take 1 tablet by mouth daily (Patient not taking: Reported on 3/15/2022) 90 tablet 3    Diphenhydramine-APAP, sleep, (TYLENOL PM EXTRA STRENGTH PO) Take 2 capsules by mouth nightly as needed  (Patient not taking: Reported on 3/15/2022)       No current facility-administered medications for this visit. Allergies   Allergen Reactions    Codeine Other (See Comments)     Describes it as a sensitivity. Social History     Socioeconomic History    Marital status:      Spouse name: jessica gunn    Number of children: 2    Years of education: Not on file    Highest education level: Not on file   Occupational History    Occupation: farmer    Tobacco Use    Smoking status: Former Smoker     Packs/day: 3.00     Years: 35.00     Pack years: 105.00    Smokeless tobacco: Former User     Quit date: 4/27/2001   Vaping Use    Vaping Use: Never used   Substance and Sexual Activity    Alcohol use: No    Drug use: No    Sexual activity: Yes     Partners: Female   Other Topics Concern    Not on file   Social History Narrative    Not on file     Social Determinants of Health     Financial Resource Strain:     Difficulty of Paying Living Expenses: Not on file   Food Insecurity:     Worried About Running Out of Food in the Last Year: Not on file    Lilian of Food in the Last Year: Not on file   Transportation Needs:     Lack of Transportation (Medical): Not on file    Lack of Transportation (Non-Medical):  Not on file   Physical Activity:     Days of Exercise per Week: Not on file    Minutes of Exercise per Session: Not on file   Stress:     Feeling of Stress : Not on file   Social Connections:     Frequency of Communication with Friends and Family: Not on file    Frequency of Social Gatherings with Friends and Family: Not on file    Attends Jainism Services: Not on file    Active Member of Clubs or Organizations: Not on file    Attends Club or Organization Meetings: Not on file    Marital Status: Not on file   Intimate Partner Violence:     Fear of Current or Ex-Partner: Not on file    Emotionally Abused: Not on file    Physically Abused: Not on file    Sexually Abused: Not on file Housing Stability:     Unable to Pay for Housing in the Last Year: Not on file    Number of Places Lived in the Last Year: Not on file    Unstable Housing in the Last Year: Not on file       Family History   Problem Relation Age of Onset    Cancer Sister     Cancer Brother     Heart Disease Father        REVIEW OF SYSTEMS:  Review of Systems   Constitutional: Positive for fatigue. Negative for chills and fever. Gastrointestinal: Negative for abdominal distention, abdominal pain, nausea and vomiting. Genitourinary: Negative for difficulty urinating, dysuria, flank pain, frequency, hematuria and urgency. Musculoskeletal: Negative for back pain and gait problem. Psychiatric/Behavioral: Negative for agitation and confusion. PHYSICAL EXAM:  There were no vitals taken for this visit. Physical Exam  Vitals and nursing note reviewed. Constitutional:       General: He is not in acute distress. Appearance: Normal appearance. He is not ill-appearing. Pulmonary:      Effort: Pulmonary effort is normal. No respiratory distress. Abdominal:      General: There is no distension. Tenderness: There is no abdominal tenderness. There is no right CVA tenderness or left CVA tenderness. Neurological:      Mental Status: He is alert and oriented to person, place, and time. Mental status is at baseline. Motor: Weakness present.    Psychiatric:         Mood and Affect: Mood normal.         Behavior: Behavior normal.       DATA:  CBC with Differential:    Lab Results   Component Value Date    WBC 9.2 02/28/2022    RBC 5.20 02/28/2022    HGB 13.1 02/28/2022    HCT 42.9 02/28/2022     02/28/2022    MCV 82.5 02/28/2022    MCH 25.2 02/28/2022    MCHC 30.5 02/28/2022    RDW 13.5 02/28/2022    BANDSPCT 13 02/23/2022    LYMPHOPCT 15.0 02/28/2022    MONOPCT 8.2 02/28/2022    BASOPCT 0.4 02/28/2022    MONOSABS 0.80 02/28/2022    LYMPHSABS 1.4 02/28/2022    EOSABS 0.30 02/28/2022    BASOSABS 0.00 02/28/2022     CMP:    Lab Results   Component Value Date     02/28/2022    K 4.2 02/28/2022    K 4.9 02/24/2022     02/28/2022    CO2 23 02/28/2022    BUN 29 02/28/2022    CREATININE 1.7 02/28/2022    GFRAA 48 02/28/2022    LABGLOM 40 02/28/2022    GLUCOSE 96 02/28/2022    PROT 7.3 02/28/2022    LABALBU 3.1 02/28/2022    CALCIUM 9.1 02/28/2022    BILITOT 0.5 02/28/2022    ALKPHOS 72 02/28/2022    AST 22 02/28/2022    ALT 34 02/28/2022     No results found for this visit on 03/16/22. Lab Results   Component Value Date    PSA 0.17 09/15/2021       1. Abnormal renal function  We will send a referral to nephrology for evaluation of renal function. Patient's wife is present today and states they needed a hospital follow-up but also required a referral.    - External Referral To Nephrology  - POCT Urinalysis Dipstick w/ Micro (Auto)    2. UTI due to extended-spectrum beta lactamase (ESBL) producing Escherichia coli  Urine and blood cultures obtained on 2/22/2022 revealed E. coli ESBL. Patient currently on 3 weeks of IV antibiotics. He has 2 doses of Invanz remaining. 3. Renal cyst, right  Status post infected right renal cyst.  Denies any flank pain today. Puncture site healed. Will reevaluate in approximately 6 weeks with CT. 4. Right ureteral calculus  Currently the patient is not on any type of anticoagulant. Was previously on a Lovenox bridge. At this time has not been on any anticoagulants. Status post right ureteral stent placement for obstructing right ureteral calculus. Comes in today to schedule definitive treatment of stone. Patient has 2 days left of IV antibiotics therefore we will schedule him for next week to undergo cystoscopy right ureteral stent removal right ureteroscopy laser lithotripsy stone basket extraction right ureteral stent replacement.       We have discussed other modalities of treatment, including but not limited to a trial of medical expulsion therapy, ESWL, and monitoring the stone with subsequent imaging. Risks of the procedure were discussed which include but are not limited to bleeding, infection, postprocedural pain, damage to the kidney, ureter, bladder, or urethra, stricturing or perforation of the ureter which may require additional procedures, pain arising from the postoperative stent, failure to remove the stone, etc.    No orders of the defined types were placed in this encounter. No follow-ups on file. All information inputted into the note by the MA to include chief complaint, past medical history, past surgical history, medications, allergies, social and family history and review of systems has been reviewed and updated as needed by me. EMR Dragon/transcription disclaimer: Much of this documentt is electronic  transcription/translation of spoken language to printed text. The  electronic translation of spoken language may be erroneous, or at times,  nonsensical words or phrases may be inadvertently transcribed.  Although I  have reviewed the document for such errors, some may still exist.

## 2022-03-16 NOTE — H&P (VIEW-ONLY)
Nagi Mcmillan is a 73 y.o. male who presents today   No chief complaint on file. Patient 79-year-old male presents clinic today for follow-up. He has a long history of stones as well as recurrent infections. History of recent left renal ESWL on 1/6/2022. Recently hospitalized with sepsis secondary to obstructive pyelonephritis from right ureteral calculus and infected right renal cyst on 1/22/2022. He did undergo percutaneous drainage of the cyst drain was removed on 3/77/6300 with no complications. He continues to have an obstructing right ureteral calculus status post stent placement on 2/22/2022. He has undergone IV antibiotics and currently has 2 days remaining. Currently on Invanz. These are being done at [facility] as an outpatient. Patient continues to have some weakness although denies any flank pain, fever, chills, nausea, vomiting, lower urinary tract symptoms. Previous creatinine obtained on 2/28/2022 was 1.7, GFR 40. He comes in today for definitive treatment of his stone.       Past Medical History:   Diagnosis Date    ACS (acute coronary syndrome) ([address] 4/27/2016 4/27/16  ACS  BRANDO RISK Score 1 (angina), AUC indication 3, AUC score 7 4/27/16  Cath  99% 1st diagonal, 2.25 x 15 YURIDIA, anterior lateral hypokinesis, EF 45%     CAD (coronary artery disease)     has seen dr. Ashley Berger in the past    Cerebral artery occlusion with cerebral infarction ([address] 1998    Difficulty voiding     per pt c/o.     Hyperlipidemia     Hypertension 4/27/2016    Kidney stone     MI (myocardial infarction) ([address]     stent    Renal calculus, right 8/1/2018    UTI (urinary tract infection)     due to stones; pt has been taking iv antibiotics at home; 1/5/22 last dose       Past Surgical History:   Procedure Laterality Date    CARDIAC CATHETERIZATION      COLONOSCOPY      CYSTOSCOPY Right 2/22/2022    CYSTOSCOPY ,URETEROSCOPY WITH URETERAL STENT INSERTION performed by Jinny Monk MD at [address] [place] [address] OR    CYSTOSCOPY INSERTION / REMOVAL STENT / STONE Right 8/28/2018    CYSTOSCOPY STENT REMOVAL performed by Karri Rondon MD at [address] LITHOTRIPSY Left 1/6/2022    LEFT RENAL EXTRACORPOREAL SHOCK WAVE LITHOTRIPSY performed by Karri Rondon MD at [address] CIRCUMCISION,OTHER,28+ D/O N/A 8/21/2018    DORSAL SLIT performed by Karri Rondon MD at [address] &INDWELL STENT INSRT Right 8/21/2018    CYSTOSCOPY URETEROSCOPY LASER LITHOTRIPSY performed by Karri Rondon MD at [facility] ESWL Right 8/28/2018    ESWL EXTRACORPEAL SHOCK WAVE LITHOTRIPSY performed by Karri Rondon MD at [facility] OR       Current Outpatient Medications   Medication Sig Dispense Refill    ertapenem [facility]) infusion Infuse 1,000 mg intravenously every 24 hours for 16 days Compound per protocol.  (Patient not taking: Reported on 3/15/2022) 16 g 0    diphenhydrAMINE (BENADRYL) 25 MG tablet Take 25 mg by mouth nightly as needed for Sleep (Patient not taking: Reported on 3/15/2022)      dilTIAZem (CARDIZEM 12 HR) 120 MG extended release capsule Take 120 mg by mouth 2 times daily (Patient not taking: Reported on 3/15/2022)      lisinopril (PRINIVIL;ZESTRIL) 20 MG tablet Take 20 mg by mouth 2 times daily  (Patient not taking: Reported on 3/15/2022)      tamsulosin (FLOMAX) 0.4 MG capsule Take 0.4 mg by mouth daily  (Patient not taking: Reported on 3/15/2022)      CARTIA  MG extended release capsule TAKE 1 CAPSULE BY MOUTH EVERY DAY (Patient not taking: Reported on 12/8/2021)      warfarin (COUMADIN) 1 MG tablet Take 5 mg by mouth daily  (Patient not taking: Reported on 3/15/2022)      metoprolol succinate (TOPROL XL) 25 MG extended release tablet Take 1 tablet by mouth daily (Patient not taking: Reported on 3/15/2022) 90 tablet 3    Diphenhydramine-APAP, sleep, (TYLENOL PM EXTRA STRENGTH PO) Take 2 capsules by mouth nightly as needed  (Patient not taking: Reported on 3/15/2022)       No current facility-administered medications for this visit. Allergies   Allergen Reactions    Codeine Other (See Comments)     Describes it as a sensitivity. Social History     Socioeconomic History    Marital status:      Spouse name: jessica gunn    Number of children: 2    Years of education: Not on file    Highest education level: Not on file   Occupational History    Occupation: farmer    Tobacco Use    Smoking status: Former Smoker     Packs/day: 3.00     Years: 35.00     Pack years: 105.00    Smokeless tobacco: Former User     Quit date: 4/27/2001   Vaping Use    Vaping Use: Never used   Substance and Sexual Activity    Alcohol use: No    Drug use: No    Sexual activity: Yes     Partners: Female   Other Topics Concern    Not on file   Social History Narrative    Not on file     Social Determinants of Health     Financial Resource Strain:     Difficulty of Paying Living Expenses: Not on file   Food Insecurity:     Worried About Running Out of Food in the Last Year: Not on file    Lilian of Food in the Last Year: Not on file   Transportation Needs:     Lack of Transportation (Medical): Not on file    Lack of Transportation (Non-Medical):  Not on file   Physical Activity:     Days of Exercise per Week: Not on file    Minutes of Exercise per Session: Not on file   Stress:     Feeling of Stress : Not on file   Social Connections:     Frequency of Communication with Friends and Family: Not on file    Frequency of Social Gatherings with Friends and Family: Not on file    Attends [employer] Services: Not on file    Active Member of Clubs or Organizations: Not on file    Attends Club or Organization Meetings: Not on file    Marital Status: Not on file   Intimate Partner Violence:     Fear of Current or Ex-Partner: Not on file    Emotionally Abused: Not on file    Physically Abused: Not on file    Sexually Abused: Not on file Housing Stability:     Unable to Pay for Housing in the Last Year: Not on file    Number of Places Lived in the Last Year: Not on file    Unstable Housing in the Last Year: Not on file       Family History   Problem Relation Age of Onset    Cancer Sister     Cancer Brother     Heart Disease Father        REVIEW OF SYSTEMS:  Review of Systems   Constitutional: Positive for fatigue. Negative for chills and fever. Gastrointestinal: Negative for abdominal distention, abdominal pain, nausea and vomiting. Genitourinary: Negative for difficulty urinating, dysuria, flank pain, frequency, hematuria and urgency. Musculoskeletal: Negative for back pain and gait problem. Psychiatric/Behavioral: Negative for agitation and confusion. PHYSICAL EXAM:  There were no vitals taken for this visit. Physical Exam  Vitals and nursing note reviewed. Constitutional:       General: He is not in acute distress. Appearance: Normal appearance. He is not ill-appearing. Pulmonary:      Effort: Pulmonary effort is normal. No respiratory distress. Abdominal:      General: There is no distension. Tenderness: There is no abdominal tenderness. There is no right CVA tenderness or left CVA tenderness. Neurological:      Mental Status: He is alert and oriented to person, place, and time. Mental status is at baseline. Motor: Weakness present.    Psychiatric:         Mood and Affect: Mood normal.         Behavior: Behavior normal.       DATA:  CBC with Differential:    Lab Results   Component Value Date    WBC 9.2 02/28/2022    RBC 5.20 02/28/2022    HGB 13.1 02/28/2022    HCT 42.9 02/28/2022     02/28/2022    MCV 82.5 02/28/2022    MCH 25.2 02/28/2022    MCHC 30.5 02/28/2022    RDW 13.5 02/28/2022    BANDSPCT 13 02/23/2022    LYMPHOPCT 15.0 02/28/2022    MONOPCT 8.2 02/28/2022    BASOPCT 0.4 02/28/2022    MONOSABS 0.80 02/28/2022    LYMPHSABS 1.4 02/28/2022    EOSABS 0.30 02/28/2022    BASOSABS 0.00 02/28/2022     CMP:    Lab Results   Component Value Date     02/28/2022    K 4.2 02/28/2022    K 4.9 02/24/2022     02/28/2022    CO2 23 02/28/2022    BUN 29 02/28/2022    CREATININE 1.7 02/28/2022    GFRAA 48 02/28/2022    LABGLOM 40 02/28/2022    GLUCOSE 96 02/28/2022    PROT 7.3 02/28/2022    LABALBU 3.1 02/28/2022    CALCIUM 9.1 02/28/2022    BILITOT 0.5 02/28/2022    ALKPHOS 72 02/28/2022    AST 22 02/28/2022    ALT 34 02/28/2022     No results found for this visit on 03/16/22. Lab Results   Component Value Date    PSA 0.17 09/15/2021       1. Abnormal renal function  We will send a referral to nephrology for evaluation of renal function. Patient's wife is present today and states they needed a hospital follow-up but also required a referral.    - External Referral To Nephrology  - POCT Urinalysis Dipstick w/ Micro (Auto)    2. UTI due to extended-spectrum beta lactamase (ESBL) producing Escherichia coli  Urine and blood cultures obtained on 2/22/2022 revealed E. coli ESBL. Patient currently on 3 weeks of IV antibiotics. He has 2 doses of Invanz remaining. 3. Renal cyst, right  Status post infected right renal cyst.  Denies any flank pain today. Puncture site healed. Will reevaluate in approximately 6 weeks with CT. 4. Right ureteral calculus  Currently the patient is not on any type of anticoagulant. Was previously on a Lovenox bridge. At this time has not been on any anticoagulants. Status post right ureteral stent placement for obstructing right ureteral calculus. Comes in today to schedule definitive treatment of stone. Patient has 2 days left of IV antibiotics therefore we will schedule him for next week to undergo cystoscopy right ureteral stent removal right ureteroscopy laser lithotripsy stone basket extraction right ureteral stent replacement.       We have discussed other modalities of treatment, including but not limited to a trial of medical expulsion therapy, ESWL, and monitoring the stone with subsequent imaging. Risks of the procedure were discussed which include but are not limited to bleeding, infection, postprocedural pain, damage to the kidney, ureter, bladder, or urethra, stricturing or perforation of the ureter which may require additional procedures, pain arising from the postoperative stent, failure to remove the stone, etc.    No orders of the defined types were placed in this encounter. No follow-ups on file. All information inputted into the note by the MA to include chief complaint, past medical history, past surgical history, medications, allergies, social and family history and review of systems has been reviewed and updated as needed by me. EMR Dragon/transcription disclaimer: Much of this documentt is electronic  transcription/translation of spoken language to printed text. The  electronic translation of spoken language may be erroneous, or at times,  nonsensical words or phrases may be inadvertently transcribed.  Although I  have reviewed the document for such errors, some may still exist.

## 2022-03-18 ENCOUNTER — HOSPITAL ENCOUNTER (OUTPATIENT)
Dept: PREADMISSION TESTING | Age: 72
Discharge: HOME | End: 2022-03-22
Payer: MEDICARE

## 2022-03-18 ENCOUNTER — TELEPHONE (OUTPATIENT)
Dept: UROLOGY | Age: 72
End: 2022-03-18

## 2022-03-18 VITALS — WEIGHT: 260 LBS | HEIGHT: 75 IN | BODY MASS INDEX: 32.33 KG/M2

## 2022-03-18 LAB
ANION GAP SERPL CALCULATED.3IONS-SCNC: 11 MMOL/L (ref 7–19)
APTT: 32.1 SEC (ref 26–36.2)
BASOPHILS ABSOLUTE: 0 K/UL (ref 0–0.2)
BASOPHILS RELATIVE PERCENT: 0.3 % (ref 0–1)
BUN BLDV-MCNC: 18 MG/DL (ref 8–23)
CALCIUM SERPL-MCNC: 8.9 MG/DL (ref 8.8–10.2)
CHLORIDE BLD-SCNC: 104 MMOL/L (ref 98–111)
CO2: 24 MMOL/L (ref 22–29)
CREAT SERPL-MCNC: 1.6 MG/DL (ref 0.5–1.2)
EOSINOPHILS ABSOLUTE: 0.2 K/UL (ref 0–0.6)
EOSINOPHILS RELATIVE PERCENT: 3.7 % (ref 0–5)
GFR AFRICAN AMERICAN: 52
GFR NON-AFRICAN AMERICAN: 43
GLUCOSE BLD-MCNC: 98 MG/DL (ref 74–109)
HCT VFR BLD CALC: 39.6 % (ref 42–52)
HEMOGLOBIN: 12.1 G/DL (ref 14–18)
IMMATURE GRANULOCYTES #: 0 K/UL
INR BLD: 1.05 (ref 0.88–1.18)
LYMPHOCYTES ABSOLUTE: 1.3 K/UL (ref 1.1–4.5)
LYMPHOCYTES RELATIVE PERCENT: 21.3 % (ref 20–40)
MCH RBC QN AUTO: 25.4 PG (ref 27–31)
MCHC RBC AUTO-ENTMCNC: 30.6 G/DL (ref 33–37)
MCV RBC AUTO: 83 FL (ref 80–94)
MONOCYTES ABSOLUTE: 0.5 K/UL (ref 0–0.9)
MONOCYTES RELATIVE PERCENT: 7.8 % (ref 0–10)
NEUTROPHILS ABSOLUTE: 4.2 K/UL (ref 1.5–7.5)
NEUTROPHILS RELATIVE PERCENT: 66.6 % (ref 50–65)
PDW BLD-RTO: 13.7 % (ref 11.5–14.5)
PLATELET # BLD: 199 K/UL (ref 130–400)
PMV BLD AUTO: 11.4 FL (ref 9.4–12.4)
POTASSIUM SERPL-SCNC: 4 MMOL/L (ref 3.5–5)
PROTHROMBIN TIME: 13.9 SEC (ref 12–14.6)
RBC # BLD: 4.77 M/UL (ref 4.7–6.1)
SARS-COV-2, PCR: NOT DETECTED
SODIUM BLD-SCNC: 139 MMOL/L (ref 136–145)
WBC # BLD: 6.3 K/UL (ref 4.8–10.8)

## 2022-03-18 PROCEDURE — 80048 BASIC METABOLIC PNL TOTAL CA: CPT

## 2022-03-18 PROCEDURE — U0003 INFECTIOUS AGENT DETECTION BY NUCLEIC ACID (DNA OR RNA); SEVERE ACUTE RESPIRATORY SYNDROME CORONAVIRUS 2 (SARS-COV-2) (CORONAVIRUS DISEASE [COVID-19]), AMPLIFIED PROBE TECHNIQUE, MAKING USE OF HIGH THROUGHPUT TECHNOLOGIES AS DESCRIBED BY CMS-2020-01-R: HCPCS

## 2022-03-18 PROCEDURE — U0005 INFEC AGEN DETEC AMPLI PROBE: HCPCS

## 2022-03-18 PROCEDURE — 85610 PROTHROMBIN TIME: CPT

## 2022-03-18 PROCEDURE — 85025 COMPLETE CBC W/AUTO DIFF WBC: CPT

## 2022-03-18 PROCEDURE — 85730 THROMBOPLASTIN TIME PARTIAL: CPT

## 2022-03-18 RX ORDER — DICLOFENAC POTASSIUM 50 MG/1
50 TABLET, FILM COATED ORAL 2 TIMES DAILY
COMMUNITY

## 2022-03-18 NOTE — TELEPHONE ENCOUNTER
Pre Op nurse called saying she couldn't get pre op antibiotic to go through. She said its saying \"alternative required\" she needs provider to get back with her on what to do with that medication.

## 2022-03-22 ENCOUNTER — HOSPITAL ENCOUNTER (OUTPATIENT)
Age: 72
Setting detail: OUTPATIENT SURGERY
Discharge: HOME | End: 2022-03-22
Attending: UROLOGY | Admitting: UROLOGY
Payer: MEDICARE

## 2022-03-22 ENCOUNTER — ANESTHESIA (OUTPATIENT)
Dept: OPERATING ROOM | Age: 72
End: 2022-03-22
Payer: MEDICARE

## 2022-03-22 ENCOUNTER — ANESTHESIA EVENT (OUTPATIENT)
Dept: OPERATING ROOM | Age: 72
End: 2022-03-22
Payer: MEDICARE

## 2022-03-22 ENCOUNTER — APPOINTMENT (OUTPATIENT)
Dept: GENERAL RADIOLOGY | Age: 72
End: 2022-03-22
Attending: UROLOGY
Payer: MEDICARE

## 2022-03-22 VITALS — TEMPERATURE: 97.2 F | DIASTOLIC BLOOD PRESSURE: 75 MMHG | OXYGEN SATURATION: 100 % | SYSTOLIC BLOOD PRESSURE: 129 MMHG

## 2022-03-22 VITALS
HEART RATE: 79 BPM | BODY MASS INDEX: 32.33 KG/M2 | DIASTOLIC BLOOD PRESSURE: 112 MMHG | RESPIRATION RATE: 18 BRPM | OXYGEN SATURATION: 92 % | SYSTOLIC BLOOD PRESSURE: 141 MMHG | TEMPERATURE: 97.5 F | HEIGHT: 75 IN | WEIGHT: 260 LBS

## 2022-03-22 DIAGNOSIS — N20.0 KIDNEY STONES: ICD-10-CM

## 2022-03-22 DIAGNOSIS — N28.1 INFECTED KIDNEY CYST: Primary | ICD-10-CM

## 2022-03-22 DIAGNOSIS — N20.1 URETERAL STONE: ICD-10-CM

## 2022-03-22 PROBLEM — N11.1 OBSTRUCTIVE PYELONEPHRITIS: Status: RESOLVED | Noted: 2022-02-22 | Resolved: 2022-03-22

## 2022-03-22 PROBLEM — A41.9 SEPSIS (HCC): Status: RESOLVED | Noted: 2022-02-22 | Resolved: 2022-03-22

## 2022-03-22 PROBLEM — N47.1 PHIMOSIS: Status: RESOLVED | Noted: 2018-08-01 | Resolved: 2022-03-22

## 2022-03-22 PROCEDURE — 6360000002 HC RX W HCPCS: Performed by: NURSE PRACTITIONER

## 2022-03-22 PROCEDURE — 2580000003 HC RX 258: Performed by: NURSE ANESTHETIST, CERTIFIED REGISTERED

## 2022-03-22 PROCEDURE — 3700000001 HC ADD 15 MINUTES (ANESTHESIA): Performed by: UROLOGY

## 2022-03-22 PROCEDURE — 7100000011 HC PHASE II RECOVERY - ADDTL 15 MIN: Performed by: UROLOGY

## 2022-03-22 PROCEDURE — 82360 CALCULUS ASSAY QUANT: CPT

## 2022-03-22 PROCEDURE — 3600000014 HC SURGERY LEVEL 4 ADDTL 15MIN: Performed by: UROLOGY

## 2022-03-22 PROCEDURE — C1758 CATHETER, URETERAL: HCPCS | Performed by: UROLOGY

## 2022-03-22 PROCEDURE — 7100000000 HC PACU RECOVERY - FIRST 15 MIN: Performed by: UROLOGY

## 2022-03-22 PROCEDURE — C1769 GUIDE WIRE: HCPCS | Performed by: UROLOGY

## 2022-03-22 PROCEDURE — 2720000010 HC SURG SUPPLY STERILE: Performed by: UROLOGY

## 2022-03-22 PROCEDURE — 2709999900 HC NON-CHARGEABLE SUPPLY: Performed by: UROLOGY

## 2022-03-22 PROCEDURE — C1894 INTRO/SHEATH, NON-LASER: HCPCS | Performed by: UROLOGY

## 2022-03-22 PROCEDURE — 2500000003 HC RX 250 WO HCPCS: Performed by: NURSE ANESTHETIST, CERTIFIED REGISTERED

## 2022-03-22 PROCEDURE — 88300 SURGICAL PATH GROSS: CPT

## 2022-03-22 PROCEDURE — 3600000004 HC SURGERY LEVEL 4 BASE: Performed by: UROLOGY

## 2022-03-22 PROCEDURE — 6360000002 HC RX W HCPCS: Performed by: NURSE ANESTHETIST, CERTIFIED REGISTERED

## 2022-03-22 PROCEDURE — 7100000001 HC PACU RECOVERY - ADDTL 15 MIN: Performed by: UROLOGY

## 2022-03-22 PROCEDURE — 7100000010 HC PHASE II RECOVERY - FIRST 15 MIN: Performed by: UROLOGY

## 2022-03-22 PROCEDURE — 3700000000 HC ANESTHESIA ATTENDED CARE: Performed by: UROLOGY

## 2022-03-22 PROCEDURE — 52353 CYSTOURETERO W/LITHOTRIPSY: CPT | Performed by: UROLOGY

## 2022-03-22 PROCEDURE — 74018 RADEX ABDOMEN 1 VIEW: CPT

## 2022-03-22 RX ORDER — MIDAZOLAM HYDROCHLORIDE 1 MG/ML
INJECTION INTRAMUSCULAR; INTRAVENOUS PRN
Status: DISCONTINUED | OUTPATIENT
Start: 2022-03-22 | End: 2022-03-22 | Stop reason: SDUPTHER

## 2022-03-22 RX ORDER — ONDANSETRON 2 MG/ML
4 INJECTION INTRAMUSCULAR; INTRAVENOUS EVERY 4 HOURS PRN
Status: DISCONTINUED | OUTPATIENT
Start: 2022-03-22 | End: 2022-03-22 | Stop reason: HOSPADM

## 2022-03-22 RX ORDER — FENTANYL CITRATE 50 UG/ML
INJECTION, SOLUTION INTRAMUSCULAR; INTRAVENOUS PRN
Status: DISCONTINUED | OUTPATIENT
Start: 2022-03-22 | End: 2022-03-22 | Stop reason: SDUPTHER

## 2022-03-22 RX ORDER — MEROPENEM AND SODIUM CHLORIDE 1 G/50ML
1000 INJECTION, SOLUTION INTRAVENOUS ONCE
Status: COMPLETED | OUTPATIENT
Start: 2022-03-22 | End: 2022-03-22

## 2022-03-22 RX ORDER — LIDOCAINE HYDROCHLORIDE 10 MG/ML
INJECTION, SOLUTION EPIDURAL; INFILTRATION; INTRACAUDAL; PERINEURAL PRN
Status: DISCONTINUED | OUTPATIENT
Start: 2022-03-22 | End: 2022-03-22 | Stop reason: SDUPTHER

## 2022-03-22 RX ORDER — NITROFURANTOIN 25; 75 MG/1; MG/1
100 CAPSULE ORAL 2 TIMES DAILY
Qty: 10 CAPSULE | Refills: 0 | Status: SHIPPED | OUTPATIENT
Start: 2022-03-22 | End: 2022-03-27

## 2022-03-22 RX ORDER — HYDROCODONE BITARTRATE AND ACETAMINOPHEN 5; 325 MG/1; MG/1
1 TABLET ORAL EVERY 4 HOURS PRN
Status: DISCONTINUED | OUTPATIENT
Start: 2022-03-22 | End: 2022-03-22 | Stop reason: HOSPADM

## 2022-03-22 RX ORDER — PROPOFOL 10 MG/ML
INJECTION, EMULSION INTRAVENOUS PRN
Status: DISCONTINUED | OUTPATIENT
Start: 2022-03-22 | End: 2022-03-22 | Stop reason: SDUPTHER

## 2022-03-22 RX ORDER — DEXAMETHASONE SODIUM PHOSPHATE 10 MG/ML
INJECTION, SOLUTION INTRAMUSCULAR; INTRAVENOUS PRN
Status: DISCONTINUED | OUTPATIENT
Start: 2022-03-22 | End: 2022-03-22 | Stop reason: SDUPTHER

## 2022-03-22 RX ORDER — ROCURONIUM BROMIDE 10 MG/ML
INJECTION, SOLUTION INTRAVENOUS PRN
Status: DISCONTINUED | OUTPATIENT
Start: 2022-03-22 | End: 2022-03-22 | Stop reason: SDUPTHER

## 2022-03-22 RX ORDER — SODIUM CHLORIDE, SODIUM LACTATE, POTASSIUM CHLORIDE, CALCIUM CHLORIDE 600; 310; 30; 20 MG/100ML; MG/100ML; MG/100ML; MG/100ML
INJECTION, SOLUTION INTRAVENOUS CONTINUOUS PRN
Status: DISCONTINUED | OUTPATIENT
Start: 2022-03-22 | End: 2022-03-22 | Stop reason: SDUPTHER

## 2022-03-22 RX ORDER — ONDANSETRON 2 MG/ML
INJECTION INTRAMUSCULAR; INTRAVENOUS PRN
Status: DISCONTINUED | OUTPATIENT
Start: 2022-03-22 | End: 2022-03-22 | Stop reason: SDUPTHER

## 2022-03-22 RX ORDER — SODIUM CHLORIDE 9 MG/ML
INJECTION, SOLUTION INTRAVENOUS CONTINUOUS
Status: DISCONTINUED | OUTPATIENT
Start: 2022-03-22 | End: 2022-03-22 | Stop reason: HOSPADM

## 2022-03-22 RX ADMIN — ROCURONIUM BROMIDE 50 MG: 10 INJECTION, SOLUTION INTRAVENOUS at 09:02

## 2022-03-22 RX ADMIN — PHENYLEPHRINE HYDROCHLORIDE 100 MCG: 10 INJECTION INTRAVENOUS at 09:30

## 2022-03-22 RX ADMIN — PROPOFOL 150 MG: 10 INJECTION, EMULSION INTRAVENOUS at 09:02

## 2022-03-22 RX ADMIN — MIDAZOLAM 2 MG: 1 INJECTION INTRAMUSCULAR; INTRAVENOUS at 08:54

## 2022-03-22 RX ADMIN — SUGAMMADEX 250 MG: 100 INJECTION, SOLUTION INTRAVENOUS at 09:35

## 2022-03-22 RX ADMIN — SODIUM CHLORIDE, SODIUM LACTATE, POTASSIUM CHLORIDE, AND CALCIUM CHLORIDE: 600; 310; 30; 20 INJECTION, SOLUTION INTRAVENOUS at 08:54

## 2022-03-22 RX ADMIN — FENTANYL CITRATE 100 MCG: 50 INJECTION, SOLUTION INTRAMUSCULAR; INTRAVENOUS at 09:02

## 2022-03-22 RX ADMIN — ONDANSETRON 4 MG: 2 INJECTION INTRAMUSCULAR; INTRAVENOUS at 09:02

## 2022-03-22 RX ADMIN — LIDOCAINE HYDROCHLORIDE 50 MG: 10 INJECTION, SOLUTION EPIDURAL; INFILTRATION; INTRACAUDAL; PERINEURAL at 09:02

## 2022-03-22 RX ADMIN — MEROPENEM AND SODIUM CHLORIDE 1000 MG: 1 INJECTION, SOLUTION INTRAVENOUS at 09:05

## 2022-03-22 RX ADMIN — DEXAMETHASONE SODIUM PHOSPHATE 10 MG: 10 INJECTION, SOLUTION INTRAMUSCULAR; INTRAVENOUS at 09:02

## 2022-03-22 ASSESSMENT — PAIN SCALES - GENERAL
PAINLEVEL_OUTOF10: 0
PAINLEVEL_OUTOF10: 0

## 2022-03-22 ASSESSMENT — PAIN - FUNCTIONAL ASSESSMENT: PAIN_FUNCTIONAL_ASSESSMENT: 0-10

## 2022-03-22 ASSESSMENT — ENCOUNTER SYMPTOMS: SHORTNESS OF BREATH: 1

## 2022-03-22 NOTE — ANESTHESIA POSTPROCEDURE EVALUATION
Department of Anesthesiology  Postprocedure Note    Patient: Paulette Cooper  MRN: [id number]  YOB: 1950  Date of evaluation: 3/22/2022  Time:  9:46 AM     Procedure Summary     Date: 03/22/22 [facility]    Anesthesia Start: 3211 Anesthesia Stop: 0642    Procedures:       CYSTOSCOPY RIGHT URETERAL STENT REMOVAL (Right )      RIGHT URETEROSCOPY LASER LITHOTRIPSY STONE BASKET EXTRACTION (Right ) Diagnosis:       Ureteral stone      (N20.1 - RIGHT)    Surgeons: Obinna Sow MD Responsible Provider: SANTI Portillo CRNA    Anesthesia Type: general ASA Status: 3          Anesthesia Type: general    Renny Phase I: Renny Score: 10    Renny Phase II:      Last vitals: Reviewed and per EMR flowsheets.        Anesthesia Post Evaluation    Patient location during evaluation: bedside  Patient participation: complete - patient participated  Level of consciousness: awake and alert  Pain score: 0  Airway patency: patent  Nausea & Vomiting: no nausea  Complications: no  Cardiovascular status: hemodynamically stable  Respiratory status: acceptable  Hydration status: euvolemic

## 2022-03-22 NOTE — ANESTHESIA PRE PROCEDURE
Department of Anesthesiology  Preprocedure Note       Name:  Priscila Beyer   Age:  73 y.o.  :  1950                                          MRN:  [id number]         Date:  3/22/2022      Surgeon: Spring Lopez):  Yash López MD    Procedure: Procedure(s):  CYSTOSCOPY RIGHT URETERAL STENT REMOVAL  RIGHT URETEROSCOPY LASER LITHOTRIPSY STONE BASKET EXTRACTION  RIGHT URETERAL STENT REPLACEMENT    Medications prior to admission:   Prior to Admission medications    Medication Sig Start Date End Date Taking? Authorizing Provider   diclofenac (CATAFLAM) 50 MG tablet Take 50 mg by mouth 2 times daily  Patient not taking: Reported on 3/22/2022    Historical Provider, MD   diphenhydrAMINE (BENADRYL) 25 MG tablet Take 25 mg by mouth nightly as needed for Sleep  Patient not taking: Reported on 3/15/2022    Historical Provider, MD   lisinopril (PRINIVIL;ZESTRIL) 20 MG tablet Take 20 mg by mouth 2 times daily   Patient not taking: Reported on 3/15/2022 9/10/21   Historical Provider, MD   tamsulosin (FLOMAX) 0.4 MG capsule Take 0.4 mg by mouth daily   Patient not taking: Reported on 3/15/2022 8/12/21   Historical Provider, MD   CARTIA  MG extended release capsule TAKE 1 CAPSULE BY MOUTH EVERY DAY  Patient not taking: Reported on 2021   Historical Provider, MD   warfarin (COUMADIN) 1 MG tablet Take 5 mg by mouth daily     Historical Provider, MD   metoprolol succinate (TOPROL XL) 25 MG extended release tablet Take 1 tablet by mouth daily  Patient not taking: Reported on 3/15/2022 4/17/17   SANTI Carter   Diphenhydramine-APAP, sleep, (TYLENOL PM EXTRA STRENGTH PO) Take 2 capsules by mouth nightly as needed   Patient not taking: Reported on 3/22/2022    Historical Provider, MD       Current medications:    No current facility-administered medications for this visit. No current outpatient medications on file.      Facility-Administered Medications Ordered in Other Visits   Medication Dose Route Frequency Provider Last Rate Last Admin    meropenem (MERREM) 1000 mg in sodium chloride 0.9% 50 mL (duplex)  1,000 mg IntraVENous Once Stu De Dios, APRN - CNP           Allergies: Allergies   Allergen Reactions    Codeine Other (See Comments)     Describes it as a sensitivity. Problem List:    Patient Active Problem List   Diagnosis Code    ACS [facility] syndrome) [facility] I24.9    Family history of early CAD Z80.55    Essential hypertension I10    CAD (coronary artery disease) I25.10    Long term current use of anticoagulant therapy Z79.01    Renal calculus, right N20.0    Phimosis N47.1    Right ureteral calculus N20.1    Postoperative pain G89.18    UTI due to extended-spectrum beta lactamase (ESBL) producing Escherichia coli N39.0, B96.29, Z16.12    Renal calculus, left N20.0    Obstructive pyelonephritis N11.1    GUICHO (acute kidney injury) ([facility] N17.9    Infected kidney cyst, right N28.1    Sepsis (HCC) A41.9       Past Medical History:        Diagnosis Date    ACS (acute coronary syndrome) ([facility] 4/27/2016 4/27/16  ACS  BRANDO RISK Score 1 (angina), AUC indication 3, AUC score 7 4/27/16  Cath  99% 1st diagonal, 2.25 x 15 YURIDIA, anterior lateral hypokinesis, EF 45%     CAD (coronary artery disease)     has seen dr. Guillermo Mena in the past    Cerebral artery occlusion with cerebral infarction ([facility] 1998    Difficulty voiding     per pt c/o.     Hyperlipidemia     Hypertension 4/27/2016    Kidney stone     MI (myocardial infarction) ([facility]     stent    Renal calculus, right 8/1/2018    UTI (urinary tract infection)     due to stones; pt has been taking iv antibiotics at home; 1/5/22 last dose       Past Surgical History:        Procedure Laterality Date    CARDIAC CATHETERIZATION      COLONOSCOPY      CYSTOSCOPY Right 2/22/2022    CYSTOSCOPY ,URETEROSCOPY WITH URETERAL STENT INSERTION performed by Jaquan Schwartz MD at [address] / [facility] / STONE Right 8/28/2018    CYSTOSCOPY STENT REMOVAL performed by Ne Degroot MD at [address] LITHOTRIPSY Left 1/6/2022    LEFT RENAL EXTRACORPOREAL SHOCK WAVE LITHOTRIPSY performed by Ne Degroot MD at [facility] CIRCUMCISION,OTHER,28+ D/O N/A 8/21/2018    DORSAL SLIT performed by Ne Degroot MD at [place] [address] W/LITHOTRIPSY &INDWELL STENT INSRT Right 8/21/2018    CYSTOSCOPY URETEROSCOPY LASER LITHOTRIPSY performed by Ne Degroot MD at [facility] ESWL Right 8/28/2018    ESWL EXTRACORPEAL SHOCK WAVE LITHOTRIPSY performed [facility] Ne [facility]root MD [facility]       Social History:    Social History     Tobacco Use    Smoking status: Former Smoker     Packs/day: 3.00     Years: 35.00     Pack years: 105.00    Smokeless tobacco: Former User     Quit date: 4/27/2001   Substance Use Topics    Alcohol use: No                                Counseling given: Not Answered      Vital Signs (Current): There were no vitals filed for this visit.                                            BP Readings from Last 3 Encounters:   03/22/22 (!) 146/102   03/15/22 (!) 131/49   02/28/22 (!) 164/93       NPO Status:                                                                                 BMI:   Wt Readings from Last 3 Encounters:   03/22/22 260 lb (117.9 kg)   03/18/22 260 lb (117.9 kg)   03/15/22 262 lb (118.8 kg)     There is no height or weight on file to calculate BMI.    CBC:   Lab Results   Component Value Date    WBC 6.3 03/18/2022    RBC 4.77 03/18/2022    HGB 12.1 03/18/2022    HCT 39.6 03/18/2022    MCV 83.0 03/18/2022    RDW 13.7 03/18/2022     03/18/2022       CMP:   Lab Results   Component Value Date     03/18/2022    K 4.0 03/18/2022    K 4.9 02/24/2022     03/18/2022    CO2 24 03/18/2022    BUN 18 03/18/2022    CREATININE 1.6 03/18/2022    GFRAA 52 03/18/2022    LABGLOM 43 03/18/2022    GLUCOSE 98 03/18/2022    PROT 7.3 02/28/2022    CALCIUM 8.9 03/18/2022    BILITOT 0.5 02/28/2022    ALKPHOS 72 02/28/2022    AST 22 02/28/2022    ALT 34 02/28/2022       POC Tests: No results for input(s): POCGLU, POCNA, POCK, POCCL, POCBUN, POCHEMO, POCHCT in the last 72 hours. Coags:   Lab Results   Component Value Date    PROTIME 13.9 03/18/2022    INR 1.05 03/18/2022    APTT 32.1 03/18/2022       HCG (If Applicable): No results found for: PREGTESTUR, PREGSERUM, HCG, HCGQUANT     ABGs:   Lab Results   Component Value Date    PHART 7.380 02/22/2022    PO2ART 94.0 02/22/2022    ZWO5UOQ 35.0 02/22/2022    CFO1GFH 20.7 02/22/2022    BEART -3.8 02/22/2022    [id number] 95.2 02/22/2022        Type & Screen (If Applicable):  No results found for: LABABO, LABRH    Drug/Infectious Status (If Applicable):  No results found for: HIV, HEPCAB    COVID-19 Screening (If Applicable):   Lab Results   Component Value Date    COVID19 Not Detected 03/18/2022           Anesthesia Evaluation  Patient summary reviewed and Nursing notes reviewed  Airway: Mallampati: III  TM distance: >3 FB   Neck ROM: full  Mouth opening: > = 3 FB Dental:    (+) upper dentures and lower dentures      Pulmonary: breath sounds clear to auscultation  (+) shortness of breath: new,            Patient did not smoke on day of surgery. Cardiovascular:    (+) hypertension:, past MI: > 6 months, CAD:, CABG/stent:,       ECG reviewed  Rhythm: regular  Rate: abnormal                    Neuro/Psych:   (+) CVA:,             GI/Hepatic/Renal: Neg GI/Hepatic/Renal ROS            Endo/Other: Negative Endo/Other ROS                    Abdominal:             Vascular: negative vascular ROS. Other Findings:               Anesthesia Plan      general     ASA 3       Induction: intravenous. MIPS: Postoperative opioids intended and Prophylactic antiemetics administered. Anesthetic plan and risks discussed with patient.                       Rich Curiel APRN - CRNA

## 2022-03-22 NOTE — INTERVAL H&P NOTE
Update History & Physical    The patient's History and Physical of March 16, 2022 was reviewed with the patient and I examined the patient. There was no change. The surgical site was confirmed by the patient and me. Plan: The risks, benefits, expected outcome, and alternative to the recommended procedure have been discussed with the patient. Patient understands and wants to proceed with the procedure.      Electronically signed by Alesia Young MD on 3/22/2022 at 8:34 AM

## 2022-03-22 NOTE — OP NOTE
Brief operative Note      Patient: Pankaj Ibanez  YOB: 1950  MRN: [id number]    Date of Procedure: 3/22/2022    Pre-Op Diagnosis: N20.1 - RIGHT ureteral calculus    Post-Op Diagnosis: Same       Procedure(s):  CYSTOSCOPY RIGHT URETERAL STENT REMOVAL  RIGHT URETEROSCOPY LASER LITHOTRIPSY STONE BASKET EXTRACTION    Surgeon(s):  Mahsa Lane MD    Assistant:   * No surgical staff found *    Anesthesia: General    Estimated Blood Loss (mL): 0    Complications: None    Specimens:   ID Type Source Tests Collected by Time Destination   1 : Right Ureteral Stones Stone (Calculus) Ureter STONE ANALYSIS Mahsa Lane MD 3/22/2022 8912        Implants:  * No implants in log *      Drains:   Closed/Suction Drain Inferior; Lateral;Right Back Other (Comment) (Active)   Site Description Unable to view 02/24/22 2124   Dressing Status Clean;Dry; Intact 02/26/22 0549   Drainage Appearance Bhagat;Brown; Thick 02/24/22 2124   Status Open to gravity drainage 02/26/22 0549   Output (ml) 50 ml 02/26/22 0433       [REMOVED] Urethral Catheter Double-lumen 18 fr (Removed)   $ Urethral catheter insertion Inserted for procedure 02/22/22 2050   Catheter Indications Perioperative use for selected surgical procedures 02/26/22 0139   Site Assessment No urethral drainage 02/26/22 1300   Urine Color Yellow 02/26/22 1300   Urine Appearance Hazy 02/26/22 1300   Output (mL) 500 mL 02/26/22 1300       Findings: Proximal right ureteral calculus fragmented multiple smaller fragments which were then removed with General Fraga. Survey of the kidney revealed no additional stones or stone fragments remaining in the kidney. After extraction of the stone in the ureter ureter was examined and's entirety and there was no remaining stones or stone fragments. No ureteral trauma stent was not left in place.     Detailed Description of Procedure:   See dictated report:  [id number]    Disposition to PACU then to op care outpatient he will follow-up in 6

## 2022-03-22 NOTE — OP NOTE
[facility]                 [address] [id number]                                OPERATIVE REPORT    PATIENT NAME: ALLIE TAYLOR                       :        1950  MED REC NO:   [id number]                              ROOM:  ACCOUNT NO:   [de-identified]                           ADMIT DATE: 2022  PROVIDER:     Reuben Thomas MD    DATE OF PROCEDURE:  2022    TITLE OF OPERATION:  1. Cystoscopy, right ureteral stent removal.  2.  Right ureteroscopy, laser lithotripsy with stone basket extraction. PREOPERATIVE DIAGNOSIS:  Proximal right ureteral calculus. POSTOPERATIVE DIAGNOSIS:  Proximal right ureteral calculus. ANESTHETIC:  General anesthetic. ATTENDING SURGEON:  Reuben Thomas MD    ESTIMATED BLOOD LOSS:  0 mL. HISTORY:  The patient is a 72-year-old gentleman who presented with  obstructive right pyelonephritis secondary to proximal right ureteral  calculus. A stent was placed. He also had an infected right renal  cyst.  This was managed with percutaneous drainage of the cyst.   Followup CT scan 4 days after drain placement showed the stone along the  course of the proximal stent and the cyst was completely collapsed. There was no further drainage, so the drain was removed. He has now  completed a full course of Invanz antibiotic because he did grow out  ESBL. He does have some chronic kidney disease with creatinine 1.7, GFR  40. He now presents to undergo definitive treatment of the right  proximal ureteral calculus. He does need followup imaging for the right  renal cyst that was percutaneously drained. The risk and complication  of the procedure were discussed with him and he was agreeable to  proceed. We did discuss the possibility of failure to remove the stone,  injury to the ureter, need for stent replacement, need for subsequent  adjuvant or repeat procedure. He understands.     DESCRIPTION OF PROCEDURE:  The patient was brought to the operating  room, underwent general anesthetic. He was placed in the lithotomy  position. His genitalia was prepped and draped in routine sterile  fashion. He did receive a preoperative antibiotic consistent with  meropenem. I first began by placing 22-English cystoscope in the meatus  and then advanced under direct vision. Penile, bulbar, and prostatic  urethra all appeared normal.  He had no real significant prostatic  enlargement or obstruction. Entering the patient's bladder, the bladder  was inspected with a 30-degree lens. Stent was seen protruding from the  right ureteral orifice. The left orifice was normal and the rest of  bladder mucosa was normal.  There was some mild erythema around the  orifice from the stent. The stent was grasped with a grasper and  brought out through the meatus. A 0.035 sensor tip guidewire was then  inserted through the stent and under fluoroscopic guidance, this was  passed up to the kidney. I could see the stone fluoroscopically in the  proximal ureteral location. I then passed a ureteral access sheath  first by passing the introducer, 12-English introducer, up to about the  mid ureter without difficulty. Contrast was then injected through the  end of this and after confirming location of the stone which was more  proximal about L2 or 3. I then replaced the guidewire and I removed the  introducer. The outer 14-English sheath was then placed over the  introducer and then the introducer and sheath was then passed over the  guidewire without difficulty up into the proximal ureter below the  stone. I then removed the introducer leaving the ureteral access sheath  in place. A flexible ureteroscope was then advanced alongside the  guidewire into the access sheath up into the ureter. A stone was seen  within the lumen of the ureter.   I first tried to see if I could extract  this with a [place] basket since the stent had already been there for  dilation and I could not get it to budge so I disengaged it from the  basket. I then used a 200 micron holmium laser fiber to fragment the  stone in multiple small pieces. Most of these were washed out through  the access sheath. There were a couple of the larger pieces that I did  use [place] basket to extract. There were a couple of pieces that did  retropulse or migrate proximally up into the kidney and again, using  [place] basket I was able to extract these. I then surveyed the kidney  after all the fragments were removed and made sure there was no large  stone fragments remaining and there were none. Once I was certain there  were no large stones remaining in the kidney, I then pulled back into  the proximal ureter. I examined the proximal ureter, there were no  fragments within the proximal ureter. I then withdrew the access sheath  visualizing the ureter in its entirety with a ureteroscope tip beyond  the proximal end of the access sheath. There was no evidence of any  stones within the ureter and there was no evidence of any ureteral  trauma. Once the access sheath was completely removed, the scope was  removed as well. At this point, I felt confident that he did not need a  stent since there was no other stones remaining and there was no  evidence of ureteral trauma, so the guidewire was then removed. I then  looked back in the bladder with a cystoscope and drained the bladder. A  digital rectal examination was performed. His prostate was  approximately 25 to 30 gm, smooth, no nodularity. The procedure was  then concluded. Estimated blood loss was 0 mL. Again, a stent was not  left in place. He will need to follow up in 6 weeks to see me with a CT  scan for urogram protocol to follow up on his infected cyst to make sure  this has not recurred.         Radha Singletary MD    D: 03/22/2022 10:56:15      T: 03/22/2022 15:02:50     [id number]  Job#: [id number]     Doc#: [id number]    CC:

## 2022-03-26 LAB
CALCULI COMPOSITION: NORMAL
MASS: 44 MG
STONE DESCRIPTION: NORMAL

## 2022-05-02 ENCOUNTER — HOSPITAL ENCOUNTER (OUTPATIENT)
Dept: CT IMAGING | Age: 72
Discharge: HOME | End: 2022-05-02
Payer: MEDICARE

## 2022-05-02 DIAGNOSIS — N28.1 INFECTED KIDNEY CYST: ICD-10-CM

## 2022-05-02 PROCEDURE — 74178 CT ABD&PLV WO CNTR FLWD CNTR: CPT

## 2022-05-02 PROCEDURE — 6360000004 HC RX CONTRAST MEDICATION: Performed by: UROLOGY

## 2022-05-02 RX ADMIN — IOPAMIDOL 90 ML: 755 INJECTION, SOLUTION INTRAVENOUS at 07:38

## 2022-05-09 ENCOUNTER — OFFICE VISIT (OUTPATIENT)
Dept: UROLOGY | Age: 72
End: 2022-05-09
Payer: MEDICARE

## 2022-05-09 VITALS
HEART RATE: 81 BPM | BODY MASS INDEX: 33.94 KG/M2 | WEIGHT: 273 LBS | OXYGEN SATURATION: 97 % | SYSTOLIC BLOOD PRESSURE: 142 MMHG | DIASTOLIC BLOOD PRESSURE: 84 MMHG | HEIGHT: 75 IN | TEMPERATURE: 98.4 F

## 2022-05-09 DIAGNOSIS — N20.1 RIGHT URETERAL CALCULUS: ICD-10-CM

## 2022-05-09 DIAGNOSIS — N15.1 PERINEPHRIC ABSCESS: Primary | ICD-10-CM

## 2022-05-09 DIAGNOSIS — B96.29 UTI DUE TO EXTENDED-SPECTRUM BETA LACTAMASE (ESBL) PRODUCING ESCHERICHIA COLI: ICD-10-CM

## 2022-05-09 DIAGNOSIS — N39.0 UTI DUE TO EXTENDED-SPECTRUM BETA LACTAMASE (ESBL) PRODUCING ESCHERICHIA COLI: ICD-10-CM

## 2022-05-09 DIAGNOSIS — Z16.12 UTI DUE TO EXTENDED-SPECTRUM BETA LACTAMASE (ESBL) PRODUCING ESCHERICHIA COLI: ICD-10-CM

## 2022-05-09 LAB
APPEARANCE FLUID: CLEAR
BILIRUBIN, POC: NORMAL
BLOOD URINE, POC: NORMAL
CLARITY, POC: CLEAR
COLOR, POC: YELLOW
GLUCOSE URINE, POC: NORMAL
KETONES, POC: NORMAL
LEUKOCYTE EST, POC: NORMAL
NITRITE, POC: NORMAL
PH, POC: 7
PROTEIN, POC: NORMAL
SPECIFIC GRAVITY, POC: 1.02
UROBILINOGEN, POC: 0.2

## 2022-05-09 PROCEDURE — 99214 OFFICE O/P EST MOD 30 MIN: CPT | Performed by: UROLOGY

## 2022-05-09 PROCEDURE — 81002 URINALYSIS NONAUTO W/O SCOPE: CPT | Performed by: UROLOGY

## 2022-05-09 ASSESSMENT — ENCOUNTER SYMPTOMS
CHEST TIGHTNESS: 0
FACIAL SWELLING: 0
EYE DISCHARGE: 0
SORE THROAT: 0
WHEEZING: 0
VOMITING: 0
EYE REDNESS: 0
NAUSEA: 0
BACK PAIN: 0

## 2022-05-09 NOTE — PROGRESS NOTES
Angelique Ascencio is a 73 y.o. male who presents today   Chief Complaint   Patient presents with    Follow-up     I am here for a 6 week FU. I had my CT done prior. Hospital follow-up  Patient 68-year-old gentleman who presented with obstructive right pyelonephritis secondary to proximal right ureteral calculus 2/22/2022. That time he had an infected right renal cyst/perinephric abscess which was treated with percutaneous drainage. He had a right ureteral stent placed on 2/22/2022. He completed 3 weeks of Invanz IV antibiotic for ESBL E. coli. The percutaneous was drain was removed on 2/26/2022 as follow-up CT scan had showed the cyst was in collection was essentially resolved and drained and the drain output was minimal.  Since that time he underwent definitive treatment on the stone on 3/22/2022. He comes in today for follow-up. He had some back pain lower right back and says he has had low-grade fever up to 99 but otherwise states he feels well. No hematuria no symptoms of renal colic  Had a follow-up CT scan done    Past Medical History:   Diagnosis Date    ACS (acute coronary syndrome) ([address] 4/27/2016 4/27/16  ACS  BRANDO RISK Score 1 (angina), AUC indication 3, AUC score 7 4/27/16  Cath  99% 1st diagonal, 2.25 x 15 YURIDIA, anterior lateral hypokinesis, EF 45%     CAD (coronary artery disease)     has seen dr. Gaudencio Wu in the past    Cerebral artery occlusion with cerebral infarction ([address] 1998    Difficulty voiding     per pt c/o.     Hyperlipidemia     Hypertension 4/27/2016    Kidney stone     MI (myocardial infarction) ([address]     stent    Renal calculus, right 8/1/2018    UTI (urinary tract infection)     due to stones; pt has been taking iv antibiotics at home; 1/5/22 last dose       Past Surgical History:   Procedure Laterality Date    BLADDER SURGERY Right 3/22/2022    CYSTOSCOPY RIGHT URETERAL STENT REMOVAL performed by Lynn Hernandez MD at [address] [place] [address] COLONOSCOPY      CYSTOSCOPY Right 2/22/2022    CYSTOSCOPY ,URETEROSCOPY WITH URETERAL STENT INSERTION performed by Belkys Muñoz MD at [facility] Right 3/22/2022    RIGHT URETEROSCOPY LASER LITHOTRIPSY STONE BASKET EXTRACTION performed by Belkys Muñoz MD at [address] / Tomas Calzada / Steven Ramírez Right 8/28/2018    CYSTOSCOPY STENT REMOVAL performed by Belkys Muñoz MD at [address] LITHOTRIPSY Left 1/6/2022    LEFT RENAL EXTRACORPOREAL SHOCK WAVE LITHOTRIPSY performed by Belkys Muñoz MD at [address] CIRCUMCISION,OTHER,28+ D/O N/A 8/21/2018    DORSAL SLIT performed by Belkys Muñoz MD at [address] &INDWELL STENT INSRT Right 8/21/2018    CYSTOSCOPY URETEROSCOPY LASER LITHOTRIPSY performed by Belkys Muñoz MD at [facility] ESWL Right 8/28/2018    ESWL EXTRACORPEAL SHOCK WAVE LITHOTRIPSY performed by Belkys Muñoz MD at [facility] OR       Current Outpatient Medications   Medication Sig Dispense Refill    ertapenem [facility]) infusion Infuse 1,000 mg intravenously every 24 hours for 14 days Compound per protocol. 14 g 0    diclofenac (CATAFLAM) 50 MG tablet Take 50 mg by mouth 2 times daily       diphenhydrAMINE (BENADRYL) 25 MG tablet Take 25 mg by mouth nightly as needed for Sleep       lisinopril (PRINIVIL;ZESTRIL) 20 MG tablet Take 20 mg by mouth 2 times daily       tamsulosin (FLOMAX) 0.4 MG capsule Take 0.4 mg by mouth daily       CARTIA  MG extended release capsule TAKE 1 CAPSULE BY MOUTH EVERY DAY      warfarin (COUMADIN) 1 MG tablet Take 5 mg by mouth daily       metoprolol succinate (TOPROL XL) 25 MG extended release tablet Take 1 tablet by mouth daily 90 tablet 3    Diphenhydramine-APAP, sleep, (TYLENOL PM EXTRA STRENGTH PO) Take 2 capsules by mouth nightly as needed        No current facility-administered medications for this visit.        Allergies   Allergen Reactions    Codeine Other (See Comments)     Describes it as a sensitivity. Social History     Socioeconomic History    Marital status:      Spouse name: jessica gunn    Number of children: 2    Years of education: None    Highest education level: None   Occupational History    Occupation: farmer    Tobacco Use    Smoking status: Former Smoker     Packs/day: 3.00     Years: 35.00     Pack years: 105.00    Smokeless tobacco: Former User     Quit date: 4/27/2001   Vaping Use    Vaping Use: Never used   Substance and Sexual Activity    Alcohol use: No    Drug use: No    Sexual activity: Yes     Partners: Female   Other Topics Concern    None   Social History Narrative    None     Social Determinants of Health     Financial Resource Strain:     Difficulty of Paying Living Expenses: Not on file   Food Insecurity:     Worried About Running Out of Food in the Last Year: Not on file    Lilian of Food in the Last Year: Not on file   Transportation Needs:     Lack of Transportation (Medical): Not on file    Lack of Transportation (Non-Medical):  Not on file   Physical Activity:     Days of Exercise per Week: Not on file    Minutes of Exercise per Session: Not on file   Stress:     Feeling of Stress : Not on file   Social Connections:     Frequency of Communication with Friends and Family: Not on file    Frequency of Social Gatherings with Friends and Family: Not on file    Attends Restorationist Services: Not on file    Active Member of Clubs or Organizations: Not on file    Attends Club or Organization Meetings: Not on file    Marital Status: Not on file   Intimate Partner Violence:     Fear of Current or Ex-Partner: Not on file    Emotionally Abused: Not on file    Physically Abused: Not on file    Sexually Abused: Not on file   Housing Stability:     Unable to Pay for Housing in the Last Year: Not on file    Number of [place] in the Last Year: Not on file    Unstable Housing in the Last Year: Not on file       Family History   Problem Relation Age of Onset    Cancer Sister     Cancer Brother     Heart Disease Father        REVIEW OF SYSTEMS:  Review of Systems   Constitutional: Negative for chills and fever. HENT: Negative for facial swelling and sore throat. Eyes: Negative for discharge and redness. Respiratory: Negative for chest tightness and wheezing. Cardiovascular: Negative for chest pain and palpitations. Gastrointestinal: Negative for nausea and vomiting. Endocrine: Negative for polyphagia and polyuria. Genitourinary: Negative for decreased urine volume, difficulty urinating, dysuria, enuresis, flank pain, frequency, genital sores, hematuria, penile discharge, penile pain, penile swelling, scrotal swelling, testicular pain and urgency. Musculoskeletal: Negative for back pain and neck stiffness. Skin: Negative for rash and wound. Neurological: Negative for dizziness and headaches. Hematological: Negative for adenopathy. Does not bruise/bleed easily. Psychiatric/Behavioral: Negative for confusion and hallucinations. PHYSICAL EXAM:  BP (!) 142/84   Pulse 81   Temp 98.4 °F (36.9 °C)   Ht 6' 3\" (1.905 m)   Wt 273 lb (123.8 kg)   SpO2 97%   BMI 34.12 kg/m²   Physical Exam  Constitutional:       General: He is not in acute distress. Appearance: Normal appearance. He is well-developed. He is obese. HENT:      Head: Normocephalic and atraumatic. Nose: Nose normal.   Eyes:      General: No scleral icterus. Conjunctiva/sclera: Conjunctivae normal.      Pupils: Pupils are equal, round, and reactive to light. Neck:      Trachea: No tracheal deviation. Cardiovascular:      Rate and Rhythm: Normal rate and regular rhythm. Pulmonary:      Effort: Pulmonary effort is normal. No respiratory distress. Breath sounds: No stridor. Abdominal:      General: There is no distension. Palpations: Abdomen is soft. There is no mass. Tenderness: There is no abdominal tenderness. There is right CVA tenderness. Musculoskeletal:         General: No tenderness. Normal range of motion. Cervical back: Normal range of motion and neck supple. Lymphadenopathy:      Cervical: No cervical adenopathy. Skin:     General: Skin is warm and dry. Findings: No erythema. Neurological:      Mental Status: He is alert and oriented to person, place, and time. Psychiatric:         Behavior: Behavior normal.         Judgment: Judgment normal.             DATA:  CBC:   Lab Results   Component Value Date    WBC 6.3 03/18/2022    RBC 4.77 03/18/2022    HGB 12.1 03/18/2022    HCT 39.6 03/18/2022    MCV 83.0 03/18/2022    MCH 25.4 03/18/2022    MCHC 30.6 03/18/2022    RDW 13.7 03/18/2022     03/18/2022    MPV 11.4 03/18/2022     BMP:    Lab Results   Component Value Date     03/18/2022    K 4.0 03/18/2022    K 4.9 02/24/2022     03/18/2022    CO2 24 03/18/2022    BUN 18 03/18/2022    LABALBU 3.1 02/28/2022    CREATININE 1.6 03/18/2022    CALCIUM 8.9 03/18/2022    GFRAA 52 03/18/2022    LABGLOM 43 03/18/2022    GLUCOSE 98 03/18/2022     Results for orders placed or performed in visit on 05/09/22   POCT Urinalysis no Micro   Result Value Ref Range    Color, UA yellow     Clarity, UA clear     Glucose, UA POC neg     Bilirubin, UA neg     Ketones, UA neg     Spec Grav, UA 1.020     Blood, UA POC neg     pH, UA 7.0     Protein, UA POC neg     Urobilinogen, UA 0.2     Leukocytes, UA neg     Nitrite, UA neg     Appearance, Fluid Clear Clear, Slightly Cloudy     Lab Results   Component Value Date    PSA 0.17 09/15/2021       IMAGING:  CT scan for follow-up done on 5/2/2022 shows recurrence of the right lower pole perinephric collection or abscess. It actually is less prevalent involving lower pole kidney now more involving the right psoas. I discussed this with radiologist Dr. Alejandro Dias.   He felt that it was amenable to percutaneous drainage. Otherwise the right kidney functions well there is no hydronephrosis and there is no stone. Drainage and excretion of contrast bilaterally    No radiation information found for this patient  Narrative   CT ABDOMEN PELVIS W WO CONTRAST 5/2/2022 7:37 AM   HISTORY: N28.1   COMPARISON: CT scan dated 2/25/2022    DLP: 5479 mGy cm   TECHNIQUE: Precontrast images of the abdomen and pelvis were obtained,   followed by postcontrast images of the abdomen and pelvis in the   arterial and delayed phases utilizing a urogram protocol. Automated   exposure control was also utilized to decrease patient radiation dose. FINDINGS:    The lung bases and base of the heart are unremarkable. URINARY TRACT: Bilateral renal cortical thinning. There are bilateral   small benign-appearing low density renal cysts. Bilateral small   nonobstructing renal stones. Recurrent right perinephric abscess,   adjacent to the right inferior pole and extending into the right   psoas, approximately 6.0 x 4.7 x 4.1 cm (series 4-image 46 and series   602-image 46). Ureters are decompressed. LIVER: Liver steatosis. There is no focal liver lesion. The hepatic   vasculature is patent. BILIARY SYSTEM: The gallbladder is decompressed. No intrahepatic or   extrahepatic ductal dilation is noted. PANCREAS: There is no focal pancreatic lesion. SPLEEN: Unremarkable. ADRENALS: Bilateral adrenal glands are unremarkable. RETROPERITONEUM: Right perinephric abscess involving the right psoas,   as described above. Retroperitoneal space is otherwise unremarkable. GI TRACT: Stomach and small bowel are unremarkable. Moderate colonic   stool. No inflammatory changes along the colon. Normal appendix. OTHER: No mesenteric adenopathy or mass. Mild atheromatous vascular   calcification. 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 PELVIS: Fat-containing left inguinal hernia.  No pelvic mass or adenopathy. Urinary bladder is unremarkable.        Impression   1. Recurrent right perinephric abscess measuring approximately in   greatest long axis 6 cm, extending into the right psoas, as described   above. Signed by Dr Atilio Vallejo           1. Perinephric abscess  CT scan shows reaccumulation of perinephric collection/abscess. I talked to the radiologist we will plan for percutaneous CT-guided drain placement and aspiration. We will asked that the collection fluid be sent for culture. He does take Coumadin and diclofenac he will have to be off these for 5 days. Will check stat coags the day of we will plan to do this on Friday, 5/13/2022 approved by Dr. Luda Negrete. We will go ahead and get him started on Invanz. Try to do this as an outpatient if possible. We will have him follow-up with me proxy 1 week after the drain is placed for repeat imaging  - CT GUIDED NEEDLE PLACEMENT; Future  - CT DRAINAGE HEMATOMA/SEROMA/FLUID COLLECTION; Future  - Culture, Body Fluid; Future  - Protime-INR; Future  - APTT; Future  - CT ABDOMEN PELVIS WO CONTRAST Additional Contrast? Radiologist Recommendation; Future  - ertapenem (INVANZ) infusion; Infuse 1,000 mg intravenously every 24 hours for 14 days Compound per protocol. Dispense: 14 g; Refill: 0    2. UTI due to extended-spectrum beta lactamase (ESBL) producing Escherichia coli  Previous treated with long course of Invanz. He has reaccumulation of collection we will plan for percutaneous drainage and restart Invanz. May need to get infectious disease involvement. We will try to manage this as an outpatient if possible    3.  Right ureteral calculus  Resolved after ureteroscopy laser lithotripsy      Orders Placed This Encounter   Procedures    Culture, Body Fluid     Right perinephric fluid collection     Standing Status:   Future     Standing Expiration Date:   5/9/2023    CT GUIDED NEEDLE PLACEMENT     Standing Status:   Future     Standing Expiration Date: 5/9/2023     Order Specific Question:   Reason for exam:     Answer:   Right perinephric collection/abscess status post previous drain placement now reaccumulated need CT-guided drain placement.  CT DRAINAGE HEMATOMA/SEROMA/FLUID COLLECTION     Fluid for culture     Standing Status:   Future     Standing Expiration Date:   5/9/2023     Order Specific Question:   Reason for exam:     Answer:   CT-guided percutaneous drain placement right perinephric fluid collection    CT ABDOMEN PELVIS WO CONTRAST Additional Contrast? Radiologist Recommendation     Standing Status:   Future     Standing Expiration Date:   5/9/2023     Order Specific Question:   Additional Contrast?     Answer:   Radiologist Recommendation     Order Specific Question:   Reason for exam:     Answer:   Percutaneous drainage of right perinephric abscess    Protime-INR     Please run stat prior to procedure     Standing Status:   Future     Standing Expiration Date:   5/9/2023     Order Specific Question:   Daily Coumadin Dose? Answer:   1 mg    APTT     Run stat prior to procedure     Standing Status:   Future     Standing Expiration Date:   5/9/2023     Order Specific Question:   Daily Heparin Dose? Answer:   0    POCT Urinalysis no Micro        Return in about 11 days (around 5/20/2022) for office visit after xray study. All information inputted into the note by the MA to include chief complaint, past medical history, past surgical history, medications, allergies, social and family history and review of systems has been reviewed and updated as needed by me. EMR Dragon/transcription disclaimer: Much of this documentt is electronic  transcription/translation of spoken language to printed text. The  electronic translation of spoken language may be erroneous, or at times,  nonsensical words or phrases may be inadvertently transcribed.  Although I  have reviewed the document for such errors, some may still exist.

## 2022-05-13 ENCOUNTER — HOSPITAL ENCOUNTER (OUTPATIENT)
Dept: CT IMAGING | Age: 72
Discharge: HOME | End: 2022-05-13
Payer: MEDICARE

## 2022-05-13 VITALS
DIASTOLIC BLOOD PRESSURE: 81 MMHG | BODY MASS INDEX: 33.94 KG/M2 | HEART RATE: 88 BPM | HEIGHT: 75 IN | WEIGHT: 273 LBS | TEMPERATURE: 98.6 F | OXYGEN SATURATION: 98 % | RESPIRATION RATE: 22 BRPM | SYSTOLIC BLOOD PRESSURE: 148 MMHG

## 2022-05-13 DIAGNOSIS — N15.1 PERINEPHRIC ABSCESS: ICD-10-CM

## 2022-05-13 LAB
APTT: 30.4 SEC (ref 26–36.2)
INR BLD: 1.19 (ref 0.88–1.18)
PLATELET # BLD: 277 K/UL (ref 130–400)
PROTHROMBIN TIME: 15.1 SEC (ref 12–14.6)

## 2022-05-13 PROCEDURE — C1729 CATH, DRAINAGE: HCPCS

## 2022-05-13 PROCEDURE — 85049 AUTOMATED PLATELET COUNT: CPT

## 2022-05-13 PROCEDURE — 87015 SPECIMEN INFECT AGNT CONCNTJ: CPT

## 2022-05-13 PROCEDURE — 87205 SMEAR GRAM STAIN: CPT

## 2022-05-13 PROCEDURE — 87070 CULTURE OTHR SPECIMN AEROBIC: CPT

## 2022-05-13 PROCEDURE — 85610 PROTHROMBIN TIME: CPT

## 2022-05-13 PROCEDURE — 87075 CULTR BACTERIA EXCEPT BLOOD: CPT

## 2022-05-13 PROCEDURE — 87186 SC STD MICRODIL/AGAR DIL: CPT

## 2022-05-13 PROCEDURE — 85730 THROMBOPLASTIN TIME PARTIAL: CPT

## 2022-05-13 RX ORDER — SODIUM CHLORIDE 0.9 % (FLUSH) 0.9 %
10 SYRINGE (ML) INJECTION EVERY 12 HOURS SCHEDULED
Status: DISCONTINUED | OUTPATIENT
Start: 2022-05-13 | End: 2022-05-15 | Stop reason: HOSPADM

## 2022-05-13 ASSESSMENT — PAIN - FUNCTIONAL ASSESSMENT: PAIN_FUNCTIONAL_ASSESSMENT: NONE - DENIES PAIN

## 2022-05-13 NOTE — PROGRESS NOTES
Patient is here today for a ct guided needle placement of right perinephric abscess. Procedure verified and patient escorted to room 1 in [facility]. Patient stated he does not have a  and does not want sedation, he stated, \"last time they just did a local shot and I was fine, I want that again). The  called CT and they had a conversation with Dr Pimentel Manuel the radiologist and he is in agreement to no sedation and stated the patient can drive as long as no sedation is used. Iv started and labs drawn and awaiting results at this time. Assessment complete. H/P updated and dated 5/9/2022 by Dr Pattie Whatley.

## 2022-05-13 NOTE — PROGRESS NOTES
Patient tolerated the procedure well. Radiologist ordered a culture and sensitivity and order was placed. Patient has securement device in place and teaching was given. Vitals stable. Discharge instructions reviewed and patient verbalized understanding of instruction.

## 2022-05-13 NOTE — OR NURSING
Patient with Dr Jhony Angel and risks and benefits of procedure were discussed and consent was signed.   Radiologist agreed with the patient for no sedation per patient request

## 2022-05-15 LAB
ANAEROBIC CULTURE: ABNORMAL
BODY FLUID CULTURE, STERILE: ABNORMAL
GRAM STAIN RESULT: ABNORMAL
ORGANISM: ABNORMAL

## 2022-05-20 ENCOUNTER — OFFICE VISIT (OUTPATIENT)
Dept: UROLOGY | Age: 72
End: 2022-05-20
Payer: MEDICARE

## 2022-05-20 ENCOUNTER — HOSPITAL ENCOUNTER (OUTPATIENT)
Dept: CT IMAGING | Age: 72
Discharge: HOME | End: 2022-05-20
Payer: MEDICARE

## 2022-05-20 ENCOUNTER — TELEPHONE (OUTPATIENT)
Dept: UROLOGY | Age: 72
End: 2022-05-20

## 2022-05-20 VITALS
HEIGHT: 75 IN | WEIGHT: 248 LBS | TEMPERATURE: 97.7 F | BODY MASS INDEX: 30.84 KG/M2 | DIASTOLIC BLOOD PRESSURE: 80 MMHG | SYSTOLIC BLOOD PRESSURE: 140 MMHG

## 2022-05-20 DIAGNOSIS — N15.1 PERINEPHRIC ABSCESS: ICD-10-CM

## 2022-05-20 DIAGNOSIS — N15.1 PERINEPHRIC ABSCESS: Primary | ICD-10-CM

## 2022-05-20 LAB
ALBUMIN SERPL-MCNC: 3.5 G/DL (ref 3.5–5.2)
ALP BLD-CCNC: 68 U/L (ref 40–130)
ALT SERPL-CCNC: 16 U/L (ref 5–41)
ANION GAP SERPL CALCULATED.3IONS-SCNC: 8 MMOL/L (ref 7–19)
APPEARANCE FLUID: CLEAR
APTT: 33.3 SEC (ref 26–36.2)
AST SERPL-CCNC: 17 U/L (ref 5–40)
BASOPHILS ABSOLUTE: 0 K/UL (ref 0–0.2)
BASOPHILS RELATIVE PERCENT: 0.5 % (ref 0–1)
BILIRUB SERPL-MCNC: 0.4 MG/DL (ref 0.2–1.2)
BILIRUBIN, POC: NORMAL
BLOOD URINE, POC: NORMAL
BUN BLDV-MCNC: 21 MG/DL (ref 8–23)
CALCIUM SERPL-MCNC: 9.4 MG/DL (ref 8.8–10.2)
CHLORIDE BLD-SCNC: 106 MMOL/L (ref 98–111)
CLARITY, POC: CLEAR
CO2: 29 MMOL/L (ref 22–29)
COLOR, POC: YELLOW
CREAT SERPL-MCNC: 1.6 MG/DL (ref 0.5–1.2)
EOSINOPHILS ABSOLUTE: 0.3 K/UL (ref 0–0.6)
EOSINOPHILS RELATIVE PERCENT: 4.4 % (ref 0–5)
GFR AFRICAN AMERICAN: 52
GFR NON-AFRICAN AMERICAN: 43
GLUCOSE BLD-MCNC: 85 MG/DL (ref 74–109)
GLUCOSE URINE, POC: NORMAL
HCT VFR BLD CALC: 42.4 % (ref 42–52)
HEMOGLOBIN: 12.7 G/DL (ref 14–18)
IMMATURE GRANULOCYTES #: 0 K/UL
INR BLD: 1.04 (ref 0.88–1.18)
KETONES, POC: NORMAL
LEUKOCYTE EST, POC: NORMAL
LYMPHOCYTES ABSOLUTE: 1.5 K/UL (ref 1.1–4.5)
LYMPHOCYTES RELATIVE PERCENT: 22.6 % (ref 20–40)
MCH RBC QN AUTO: 25.1 PG (ref 27–31)
MCHC RBC AUTO-ENTMCNC: 30 G/DL (ref 33–37)
MCV RBC AUTO: 84 FL (ref 80–94)
MONOCYTES ABSOLUTE: 0.5 K/UL (ref 0–0.9)
MONOCYTES RELATIVE PERCENT: 7.9 % (ref 0–10)
NEUTROPHILS ABSOLUTE: 4.1 K/UL (ref 1.5–7.5)
NEUTROPHILS RELATIVE PERCENT: 64.1 % (ref 50–65)
NITRITE, POC: NORMAL
PDW BLD-RTO: 15.7 % (ref 11.5–14.5)
PH, POC: 6.5
PLATELET # BLD: 265 K/UL (ref 130–400)
PMV BLD AUTO: 10.9 FL (ref 9.4–12.4)
POTASSIUM SERPL-SCNC: 4.4 MMOL/L (ref 3.5–5)
PROTEIN, POC: NORMAL
PROTHROMBIN TIME: 13.5 SEC (ref 12–14.6)
RBC # BLD: 5.05 M/UL (ref 4.7–6.1)
SODIUM BLD-SCNC: 143 MMOL/L (ref 136–145)
SPECIFIC GRAVITY, POC: 1.02
TOTAL PROTEIN: 8.8 G/DL (ref 6.6–8.7)
UROBILINOGEN, POC: 0.2
WBC # BLD: 6.4 K/UL (ref 4.8–10.8)

## 2022-05-20 PROCEDURE — 99214 OFFICE O/P EST MOD 30 MIN: CPT | Performed by: UROLOGY

## 2022-05-20 PROCEDURE — 2709999900 CT GUIDED NEEDLE PLACEMENT

## 2022-05-20 PROCEDURE — 81002 URINALYSIS NONAUTO W/O SCOPE: CPT | Performed by: UROLOGY

## 2022-05-20 PROCEDURE — 74176 CT ABD & PELVIS W/O CONTRAST: CPT

## 2022-05-20 ASSESSMENT — ENCOUNTER SYMPTOMS
NAUSEA: 0
ABDOMINAL DISTENTION: 0
EYE DISCHARGE: 0
EYE REDNESS: 0
TROUBLE SWALLOWING: 0
VOMITING: 0
BACK PAIN: 0
DIARRHEA: 0
CONSTIPATION: 0
COUGH: 0
SHORTNESS OF BREATH: 0
ABDOMINAL PAIN: 0

## 2022-05-20 NOTE — TELEPHONE ENCOUNTER
TRIED TO REACH PT TO LET THEM KNOW OF THERE UPCOMING APPT THT HAVE GORDO SCHEDULED FROM TODAYS VISIT. TRIED BOTH CONTACT NUMBERS IN CHART AND NEITHER HAVE VM SET UP TO LEAVE INFO. APPTS ARE FOR NEXT TUES 5/24/22 FOR A CT SCAN AT [facility] [address] ARRIVAL  FOR  APPT TIME, NPO 4 HOURS PRIOR. ALSO NEXT WED 5/25/22 TO FU WITH DR Evangelista Corrigan . WILL TRY PT ONE MORE TIME BEFORE END OF DAY.

## 2022-05-20 NOTE — PROGRESS NOTES
Date of the Proposed Procedure:   5/20/2022     Proposed Procedure:  perinephric abscess drain    Radiologist:  Saqib Vallejo MD    Indications:  Abnormal radiology scan    American Society of Anesthesiologists (ASA) Classification:  ASA 2 - Patient with mild systemic disease with no functional limitations    Plan of Sedation:  Local anesthetic only    Mallampati Classification: III (soft palate, base of uvula visible)    Prior to procedure:  I have reviewed the recent H&P from the referring physician, or other provider, related to ordered procedure. No interval changes were found upon examination/review since the original History and Physical / Consult Note. I have performed a pre-procedure assessment of this patient on 5/20/2022, in the CT procedure room, in the presence of a  CT Tech. I discussed with the patient,  in detail,  the risks, benefits, and alternatives to this procedure. Patient/Guardian is aware there are risks associated with moderate sedation which includes transient drop in blood pressure and need for assisted ventilation. Plan for discharge:  Discharge patient after post procedure ordered recovery time. Post azam score at pre-procedure baseline, score of at least 8 prior to discharge.       Saqib Vallejo MD  5/05/186925:37 PM

## 2022-05-20 NOTE — PROGRESS NOTES
Mary Ellen Dalton is a 73 y.o. male who presents today   Chief Complaint   Patient presents with    Follow-up     I am here today for my 1 week follow up, my CT is done      Patient 62-year-old gentleman who presented with obstructive right pyelonephritis secondary to proximal right ureteral calculus 2/22/2022. That time he had an infected right renal cyst/perinephric abscess which was treated with percutaneous drainage. He had a right ureteral stent placed on 2/22/2022. He completed 3 weeks of Invanz IV antibiotic for ESBL E. coli. The percutaneous was drain was removed on 2/26/2022 as follow-up CT scan had showed the cyst was in collection was essentially resolved and drained and the drain output was minimal.  Since that time he underwent definitive treatment on the stone on 3/22/2022. History as described above last seen on 5/9/2022. At that time CT showed reaccumulation of the right perinephric abscess. Follow-up CT scan done on 5/2/2022 showed reaccumulation of right perinephric abscess along the right psoas muscle. Patient was started back on Invanz which she is receiving daily from this [facility] hospital and he underwent CT-guided drain placement on 5/13/2022 by Dr. Francisco Chaney. There is report about 5 mL purulent drainage was aspirated and sent for culture this did grow out ESBL positive E. coli. Since that time his low-grade fever has improved he feels well. He has had minimal drainage over the last 3 days from the drain prior to that it was draining about 5 mL about every 2 to 3 days. He had a CT scan done today for follow-up. He otherwise feels well with no fevers. He does have some discomfort at the drain insertion site. He did see Dr. Ruth Young with infectious disease. I spoke to him on the phone today he recommended continuing antibiotics for about 5 days after the drain is removed.       Past Medical History:   Diagnosis Date    ACS (acute coronary syndrome) ([facility] [address] 4/27/2016 4/27/16 ACS  BRANDO RISK Score 1 (angina), AUC indication 3, AUC score 7 4/27/16  Cath  99% 1st diagonal, 2.25 x 15 YURIDIA, anterior lateral hypokinesis, EF 45%     CAD (coronary artery disease)     has seen dr. Claudia Spain in the past    Cerebral artery occlusion with cerebral infarction ([facility] [address] 1998    Difficulty voiding     per pt c/o.  Hyperlipidemia     Hypertension 4/27/2016    Kidney stone     MI (myocardial infarction) ([facility] [address]     stent    Renal calculus, right 8/1/2018    UTI (urinary tract infection)     due to stones; pt has been taking iv antibiotics at home; 1/5/22 last dose       Past Surgical History:   Procedure Laterality Date    BLADDER SURGERY Right 3/22/2022    CYSTOSCOPY RIGHT URETERAL STENT REMOVAL performed by Deepika Tanner MD at [place] [address] Right 2/22/2022    CYSTOSCOPY ,URETEROSCOPY WITH URETERAL STENT INSERTION performed by Deepika Tanner MD at [facility] Right 3/22/2022    RIGHT URETEROSCOPY LASER LITHOTRIPSY STONE BASKET EXTRACTION performed by Deepika Tanner MD at [facility] [place] Right 8/28/2018    CYSTOSCOPY STENT REMOVAL performed by Deepika Tanner MD at [facility] LITHOTRIPSY Left 1/6/2022    LEFT RENAL EXTRACORPOREAL SHOCK WAVE LITHOTRIPSY performed by Deepika Tanner MD at [facility] CIRCUMCISION,OTHER,28+ D/O N/A 8/21/2018    DORSAL SLIT performed by Deepika Tanner MD at [address] &[facility] STENT INSRT Right 8/21/2018    CYSTOSCOPY URETEROSCOPY LASER LITHOTRIPSY performed by Deepika Tanner MD at [facility] ESWL Right 8/28/2018    ESWL EXTRACORPEAL SHOCK WAVE LITHOTRIPSY performed by Deepika Tanner MD at [address] [facility] OR       Current Outpatient Medications   Medication Sig Dispense Refill    ertapenem [facility]) infusion Infuse 1,000 mg intravenously every 24 hours for 14 days Compound per protocol.  14 g 0    diclofenac (CATAFLAM) 50 MG tablet Take 50 mg by mouth 2 times daily       diphenhydrAMINE (BENADRYL) 25 MG tablet Take 25 mg by mouth nightly as needed for Sleep       lisinopril (PRINIVIL;ZESTRIL) 20 MG tablet Take 20 mg by mouth 2 times daily       tamsulosin (FLOMAX) 0.4 MG capsule Take 0.4 mg by mouth daily       CARTIA  MG extended release capsule TAKE 1 CAPSULE BY MOUTH EVERY DAY      warfarin (COUMADIN) 1 MG tablet Take 5 mg by mouth daily       metoprolol succinate (TOPROL XL) 25 MG extended release tablet Take 1 tablet by mouth daily 90 tablet 3    Diphenhydramine-APAP, sleep, (TYLENOL PM EXTRA STRENGTH PO) Take 2 capsules by mouth nightly as needed        No current facility-administered medications for this visit. Allergies   Allergen Reactions    Codeine Other (See Comments)     Describes it as a sensitivity. Social History     Socioeconomic History    Marital status:      Spouse name: jessica gunn    Number of children: 2    Years of education: None    Highest education level: None   Occupational History    Occupation: farmer    Tobacco Use    Smoking status: Former Smoker     Packs/day: 3.00     Years: 35.00     Pack years: 105.00    Smokeless tobacco: Former User     Quit date: 4/27/2001   Vaping Use    Vaping Use: Never used   Substance and Sexual Activity    Alcohol use: No    Drug use: No    Sexual activity: Yes     Partners: Female   Other Topics Concern    None   Social History Narrative    None     Social Determinants of Health     Financial Resource Strain:     Difficulty of Paying Living Expenses: Not on file   Food Insecurity:     Worried About Running Out of Food in the Last Year: Not on file    Lilian of Food in the Last Year: Not on file   Transportation Needs:     Lack of Transportation (Medical): Not on file    Lack of Transportation (Non-Medical):  Not on file   Physical Activity:     Days of Exercise per Week: Not on file  Minutes of Exercise per Session: Not on file   Stress:     Feeling of Stress : Not on file   Social Connections:     Frequency of Communication with Friends and Family: Not on file    Frequency of Social Gatherings with Friends and Family: Not on file    Attends [employer] Services: Not on file    Active Member of [address] [place] [address] or Organizations: Not on file    Attends Club or Organization Meetings: Not on file    Marital Status: Not on file   Intimate Partner Violence:     Fear of Current or Ex-Partner: Not on file    Emotionally Abused: Not on file    Physically Abused: Not on file    Sexually Abused: Not on file   Housing Stability:     Unable to Pay for Housing in the Last Year: Not on file    Number of [place] in the Last Year: Not on file    Unstable Housing in the Last Year: Not on file       Family History   Problem Relation Age of Onset    Cancer Sister     Cancer Brother     Heart Disease Father        REVIEW OF SYSTEMS:  Review of Systems   Unable to perform ROS: Other   Constitutional: Negative for chills, fatigue and fever. HENT: Negative for congestion, ear pain and trouble swallowing. Eyes: Negative for discharge and redness. Respiratory: Negative for cough and shortness of breath. Cardiovascular: Negative for chest pain. Gastrointestinal: Negative for abdominal distention, abdominal pain, constipation, diarrhea, nausea and vomiting. Endocrine: Negative for cold intolerance and heat intolerance. Genitourinary: Negative for difficulty urinating, dysuria, flank pain, frequency, hematuria and urgency. Musculoskeletal: Negative for back pain and gait problem. Skin: Negative for pallor and wound. Allergic/Immunologic: Negative for environmental allergies and immunocompromised state. Neurological: Negative for dizziness and weakness. Hematological: Negative for adenopathy. Does not bruise/bleed easily. Psychiatric/Behavioral: Negative for agitation and confusion. PHYSICAL EXAM:  BP (!) 140/80   Temp 97.7 °F (36.5 °C) (Temporal)   Ht 6' 3\" (1.905 m)   Wt 248 lb (112.5 kg)   BMI 31.00 kg/m²   Physical Exam  Vitals reviewed. Constitutional:       General: He is not in acute distress. Appearance: Normal appearance. He is well-developed. He is obese. HENT:      Head: Normocephalic and atraumatic. Nose: Nose normal.   Eyes:      General: No scleral icterus. Conjunctiva/sclera: Conjunctivae normal.      Pupils: Pupils are equal, round, and reactive to light. Neck:      Trachea: No tracheal deviation. Cardiovascular:      Rate and Rhythm: Normal rate and regular rhythm. Pulmonary:      Effort: Pulmonary effort is normal. No respiratory distress. Breath sounds: No stridor. Abdominal:      General: There is no distension. Palpations: Abdomen is soft. There is no mass. Tenderness: There is no abdominal tenderness. Comments: Continues to drain right paraspinous area is that this is come loose from the anchoring dressing. There is some serosanguineous fluid in the drainage bag. Musculoskeletal:         General: No tenderness. Normal range of motion. Cervical back: Normal range of motion and neck supple. Lymphadenopathy:      Cervical: No cervical adenopathy. Skin:     General: Skin is warm and dry. Findings: No erythema. Neurological:      Mental Status: He is alert and oriented to person, place, and time.    Psychiatric:         Behavior: Behavior normal.         Judgment: Judgment normal.             DATA:  CBC:   Lab Results   Component Value Date    WBC 6.4 05/20/2022    RBC 5.05 05/20/2022    HGB 12.7 05/20/2022    HCT 42.4 05/20/2022    MCV 84.0 05/20/2022    MCH 25.1 05/20/2022    MCHC 30.0 05/20/2022    RDW 15.7 05/20/2022     05/20/2022    MPV 10.9 05/20/2022     BMP:    Lab Results   Component Value Date     03/18/2022    K 4.0 03/18/2022    K 4.9 02/24/2022     03/18/2022    CO2 24 03/18/2022    BUN 18 03/18/2022    LABALBU 3.1 02/28/2022    CREATININE 1.6 03/18/2022    CALCIUM 8.9 03/18/2022    GFRAA 52 03/18/2022    LABGLOM 43 03/18/2022    GLUCOSE 98 03/18/2022     Results for orders placed or performed in visit on 05/20/22   POCT Urinalysis no Micro   Result Value Ref Range    Color, UA yellow     Clarity, UA clear     Glucose, UA POC neg     Bilirubin, UA neg     Ketones, UA neg     Spec Grav, UA 1.020     Blood, UA POC neg     pH, UA 6.5     Protein, UA POC neg     Urobilinogen, UA 0.2     Leukocytes, UA neg     Nitrite, UA neg     Appearance, Fluid Clear Clear, Slightly Cloudy     Lab Results   Component Value Date    PSA 0.17 09/15/2021         IMAGING:  CT scan and pelvis without contrast done today shows that the previous collection of the lower pole right kidney and along the right psoas muscle is smaller however the drain has pulled out of the collection and appears to be in the paraspinous muscle not in the collections anymore. I spoke with Dr. Darlene Wilburn with radiology he is kind enough to replace and reposition the drain. 1. Perinephric abscess  Patient is doing okay he continues on Invanz. I spoke with Dr. Merlin Espy told him about the drain being malpositioned and needing to be replaced and relocated therefore he plans continue Butch Winter through Tuesday, May 31 we will have an office follow-up with him at that time. We will send patient to radiology as above discussed with Dr. Berhane Diallo for replacement or relocating the drain that has been gotten pulled out today. I plan to see him back on May 25 with another CT scan. In addition we will have him get lab work CMP and CBC for follow-up  - POCT Urinalysis no Micro  - CT GUIDED NEEDLE PLACEMENT; Future  - Comprehensive Metabolic Panel; Future  - CBC with Auto Differential; Future  - CT ABDOMEN PELVIS WO CONTRAST Additional Contrast? Radiologist Recommendation;  Future      Orders Placed This Encounter   Procedures    CT GUIDED NEEDLE PLACEMENT     Standing Status:   Future     Number of Occurrences:   1     Standing Expiration Date:   5/20/2023     Order Specific Question:   Reason for exam:     Answer:   Previously placed CT guided drain shows drain has pulled out a perinephric abscess need drain replaced    CT ABDOMEN PELVIS WO CONTRAST Additional Contrast? Radiologist Recommendation     Standing Status:   Future     Standing Expiration Date:   7/20/2022     Order Specific Question:   Additional Contrast?     Answer:   Radiologist Recommendation     Order Specific Question:   Reason for exam:     Answer: Follow-up percutaneous drainage of right perinephric abscess    Comprehensive Metabolic Panel     Standing Status:   Future     Number of Occurrences:   1     Standing Expiration Date:   5/20/2023    CBC with Auto Differential     Standing Status:   Future     Number of Occurrences:   1     Standing Expiration Date:   5/20/2023    POCT Urinalysis no Micro        Return in about 5 days (around 5/25/2022). All information inputted into the note by the MA to include chief complaint, past medical history, past surgical history, medications, allergies, social and family history and review of systems has been reviewed and updated as needed by me. EMR Dragon/transcription disclaimer: Much of this documentt is electronic  transcription/translation of spoken language to printed text. The  electronic translation of spoken language may be erroneous, or at times,  nonsensical words or phrases may be inadvertently transcribed.  Although I  have reviewed the document for such errors, some may still exist.

## 2022-05-24 ENCOUNTER — HOSPITAL ENCOUNTER (OUTPATIENT)
Dept: CT IMAGING | Age: 72
Discharge: HOME | End: 2022-05-24
Payer: MEDICARE

## 2022-05-24 DIAGNOSIS — N15.1 PERINEPHRIC ABSCESS: ICD-10-CM

## 2022-05-24 PROCEDURE — 74176 CT ABD & PELVIS W/O CONTRAST: CPT

## 2022-05-25 ENCOUNTER — OFFICE VISIT (OUTPATIENT)
Dept: UROLOGY | Age: 72
End: 2022-05-25
Payer: MEDICARE

## 2022-05-25 VITALS
HEIGHT: 75 IN | DIASTOLIC BLOOD PRESSURE: 80 MMHG | TEMPERATURE: 98.8 F | BODY MASS INDEX: 33.87 KG/M2 | OXYGEN SATURATION: 97 % | WEIGHT: 272.4 LBS | SYSTOLIC BLOOD PRESSURE: 160 MMHG | HEART RATE: 86 BPM

## 2022-05-25 DIAGNOSIS — N15.1 PERINEPHRIC ABSCESS: Primary | ICD-10-CM

## 2022-05-25 PROCEDURE — 81002 URINALYSIS NONAUTO W/O SCOPE: CPT | Performed by: UROLOGY

## 2022-05-25 PROCEDURE — 1123F ACP DISCUSS/DSCN MKR DOCD: CPT | Performed by: UROLOGY

## 2022-05-25 PROCEDURE — 99214 OFFICE O/P EST MOD 30 MIN: CPT | Performed by: UROLOGY

## 2022-05-25 ASSESSMENT — ENCOUNTER SYMPTOMS
VOMITING: 0
SORE THROAT: 0
WHEEZING: 0
BACK PAIN: 0
FACIAL SWELLING: 0
CHEST TIGHTNESS: 0
EYE REDNESS: 0
NAUSEA: 0
EYE DISCHARGE: 0

## 2022-05-25 NOTE — PROGRESS NOTES
Luis Alberto Musa is a 81 y.o. male who presents today   Chief Complaint   Patient presents with    Follow-up     I am here today for a 5 day FU. I had my CT/Labs done prior. Patient 80-year-old gentleman who presented with obstructive right pyelonephritis secondary to proximal right ureteral calculus 2/22/2022.  That time he had an infected right renal cyst/perinephric abscess which was treated with percutaneous drainage. Mariluz Watson had a right ureteral stent placed on 2/22/2022. Mariluz Watson completed 3 weeks of Invanz IV antibiotic for ESBL E. coli.  The percutaneous was drain was removed on 2/26/2022 as follow-up CT scan had showed the cyst was in collection was essentially resolved and drained and the drain output was minimal.  Since that time he underwent definitive treatment on the stone on 3/22/2022.       History as described above last seen on 5/9/2022. At that time CT showed reaccumulation of the right perinephric abscess. Follow-up CT scan done on 5/2/2022 showed reaccumulation of right perinephric abscess along the right psoas muscle. Patient was started back on Invanz which she is receiving daily from this [facility] and he underwent CT-guided drain placement on 5/13/2022 by Dr. Cher Jameson. There is report about 5 mL purulent drainage was aspirated and sent for culture this did grow out ESBL positive E. coli. Since that time his low-grade fever has improved he feels well. He did see Dr. Devon Juan with infectious disease. I spoke to him on the phone he recommended continuing antibiotics for about 5 days after the drain is removed. Jason Sanjuanita Oppenheim says his last dose of Vickki Lowers was on Monday he said he was contacted by Dr. Urszula Vick office but a order was not sent to [facility] for the antibiotic he has not had in the last 2 days    He was last seen 5 days ago on 5/20/2022. The. The drain had got dislodged and pulled out so therefore it was repositioned back into the collection on 5/20/2022. Since that time patient has had no fever. He is complaining of discomfort at the drain insertion site. The drain is draining serosanguineous scant amount and 5 to 10 mL daily. Nonpurulent. Patient desires drain to be removed today.         Past Medical History:   Diagnosis Date    ACS (acute coronary syndrome) ([facility] 4/27/2016 4/27/16  ACS  BRANDO RISK Score 1 (angina), AUC indication 3, AUC score 7 4/27/16  Cath  99% 1st diagonal, 2.25 x 15 YURIDIA, anterior lateral hypokinesis, EF 45%     CAD (coronary artery disease)     has seen dr. Marylou Brennan in the past    Cerebral artery occlusion with cerebral infarction ([facility] 1998    Difficulty voiding     per pt c/o.     Hyperlipidemia     Hypertension 4/27/2016    Kidney stone     MI (myocardial infarction) ([facility] [facility]     stent    Renal calculus, right 8/1/2018    UTI (urinary tract infection)     due to stones; pt has been taking iv antibiotics at home; 1/5/22 last dose       Past Surgical History:   Procedure Laterality Date    BLADDER SURGERY Right 3/22/2022    CYSTOSCOPY RIGHT URETERAL STENT REMOVAL performed by Marva Holliday MD at [place] [address] Right 2/22/2022    CYSTOSCOPY ,URETEROSCOPY WITH URETERAL STENT INSERTION performed by Marva Holliday MD at [address] [place] [address] Right 3/22/2022    RIGHT URETEROSCOPY LASER LITHOTRIPSY STONE BASKET EXTRACTION performed by Marva Holliday MD at [facility] / Kimmy Catina / Estelita Ramírez Right 8/28/2018    CYSTOSCOPY STENT REMOVAL performed by Marva Holliday MD at [address] LITHOTRIPSY Left 1/6/2022    LEFT RENAL EXTRACORPOREAL SHOCK WAVE LITHOTRIPSY performed by Marva Holliday MD at [facility] CIRCUMCISION,OTHER,28+ D/O N/A 8/21/2018    DORSAL SLIT performed by Marva Holliday MD at [facility] STENT INSRT Right 8/21/2018    CYSTOSCOPY URETEROSCOPY LASER LITHOTRIPSY performed by Teddy Issa Abraham Strange MD at [address] [place] [address] ESWL Right 8/28/2018    ESWL EXTRACORPEAL SHOCK WAVE LITHOTRIPSY performed by Lynn Hernandez MD at [facility] OR       Current Outpatient Medications   Medication Sig Dispense Refill    diclofenac (CATAFLAM) 50 MG tablet Take 50 mg by mouth 2 times daily       diphenhydrAMINE (BENADRYL) 25 MG tablet Take 25 mg by mouth nightly as needed for Sleep       lisinopril (PRINIVIL;ZESTRIL) 20 MG tablet Take 20 mg by mouth 2 times daily       tamsulosin (FLOMAX) 0.4 MG capsule Take 0.4 mg by mouth daily       CARTIA  MG extended release capsule TAKE 1 CAPSULE BY MOUTH EVERY DAY      warfarin (COUMADIN) 1 MG tablet Take 5 mg by mouth daily       metoprolol succinate (TOPROL XL) 25 MG extended release tablet Take 1 tablet by mouth daily 90 tablet 3    Diphenhydramine-APAP, sleep, (TYLENOL PM EXTRA STRENGTH PO) Take 2 capsules by mouth nightly as needed        No current facility-administered medications for this visit. Allergies   Allergen Reactions    Codeine Other (See Comments)     Describes it as a sensitivity.          Social History     Socioeconomic History    Marital status:      Spouse name: jessica gunn    Number of children: 2    Years of education: None    Highest education level: None   Occupational History    Occupation: farmer    Tobacco Use    Smoking status: Former Smoker     Packs/day: 3.00     Years: 35.00     Pack years: 105.00    Smokeless tobacco: Former User     Quit date: 4/27/2001   Vaping Use    Vaping Use: Never used   Substance and Sexual Activity    Alcohol use: No    Drug use: No    Sexual activity: Yes     Partners: Female   Other Topics Concern    None   Social History Narrative    None     Social Determinants of Health     Financial Resource Strain:     Difficulty of Paying Living Expenses: Not on file   Food Insecurity:     Worried About Running Out of Food in the Last Year: Not on file    Lilian of Food in the Last Year: Not on file   Transportation Needs:     Lack of Transportation (Medical): Not on file    Lack of Transportation (Non-Medical): Not on file   Physical Activity:     Days of Exercise per Week: Not on file    Minutes of Exercise per Session: Not on file   Stress:     Feeling of Stress : Not on file   Social Connections:     Frequency of Communication with Friends and Family: Not on file    Frequency of Social Gatherings with Friends and Family: Not on file    Attends Synagogue Services: Not on file    Active Member of [address] [place] [address] Medialets or Organizations: Not on file    Attends Club or Organization Meetings: Not on file    Marital Status: Not on file   Intimate Partner Violence:     Fear of Current or Ex-Partner: Not on file    Emotionally Abused: Not on file    Physically Abused: Not on file    Sexually Abused: Not on file   Housing Stability:     Unable to Pay for Housing in the Last Year: Not on file    Number of [place] in the Last Year: Not on file    Unstable Housing in the Last Year: Not on file       Family History   Problem Relation Age of Onset    Cancer Sister     Cancer Brother     Heart Disease Father        REVIEW OF SYSTEMS:  Review of Systems   Constitutional: Negative for chills and fever. HENT: Negative for facial swelling and sore throat. Eyes: Negative for discharge and redness. Respiratory: Negative for chest tightness and wheezing. Cardiovascular: Negative for chest pain and palpitations. Gastrointestinal: Negative for nausea and vomiting. Endocrine: Negative for polyphagia and polyuria. Genitourinary: Negative for decreased urine volume, difficulty urinating, dysuria, enuresis, flank pain, frequency, genital sores, hematuria, penile discharge, penile pain, penile swelling, scrotal swelling, testicular pain and urgency. Musculoskeletal: Negative for back pain and neck stiffness. Skin: Negative for rash and wound.    Neurological: Negative for dizziness and headaches. Hematological: Negative for adenopathy. Does not bruise/bleed easily. Psychiatric/Behavioral: Negative for confusion and hallucinations. PHYSICAL EXAM:  BP (!) 160/80   Pulse 86   Temp 98.8 °F (37.1 °C)   Ht 6' 3\" (1.905 m)   Wt 272 lb 6.4 oz (123.6 kg)   SpO2 97%   BMI 34.05 kg/m²   Physical Exam  Vitals reviewed. Constitutional:       General: He is not in acute distress. Appearance: Normal appearance. He is well-developed. HENT:      Head: Normocephalic and atraumatic. Nose: Nose normal.   Eyes:      General: No scleral icterus. Conjunctiva/sclera: Conjunctivae normal.      Pupils: Pupils are equal, round, and reactive to light. Neck:      Trachea: No tracheal deviation. Cardiovascular:      Rate and Rhythm: Normal rate and regular rhythm. Pulmonary:      Effort: Pulmonary effort is normal. No respiratory distress. Breath sounds: No stridor. Abdominal:      General: There is no distension. Palpations: Abdomen is soft. There is no mass. Tenderness: There is no abdominal tenderness. Musculoskeletal:         General: No tenderness. Normal range of motion. Cervical back: Normal range of motion and neck supple. Lymphadenopathy:      Cervical: No cervical adenopathy. Skin:     General: Skin is warm and dry. Findings: No erythema. Neurological:      Mental Status: He is alert and oriented to person, place, and time.    Psychiatric:         Behavior: Behavior normal.         Judgment: Judgment normal.             DATA:  CBC:   Lab Results   Component Value Date    WBC 6.4 05/20/2022    RBC 5.05 05/20/2022    HGB 12.7 05/20/2022    HCT 42.4 05/20/2022    MCV 84.0 05/20/2022    MCH 25.1 05/20/2022    MCHC 30.0 05/20/2022    RDW 15.7 05/20/2022     05/20/2022    MPV 10.9 05/20/2022     CMP:    Lab Results   Component Value Date     05/20/2022    K 4.4 05/20/2022    K 4.9 02/24/2022     05/20/2022    CO2 29 05/20/2022    BUN 21 05/20/2022    CREATININE 1.6 05/20/2022    GFRAA 52 05/20/2022    LABGLOM 43 05/20/2022    GLUCOSE 85 05/20/2022    PROT 8.8 05/20/2022    LABALBU 3.5 05/20/2022    CALCIUM 9.4 05/20/2022    BILITOT 0.4 05/20/2022    ALKPHOS 68 05/20/2022    AST 17 05/20/2022    ALT 16 05/20/2022     Results for orders placed or performed in visit on 05/25/22   POCT Urinalysis no Micro   Result Value Ref Range    Color, UA yellow     Clarity, UA clear     Glucose, UA POC neg     Bilirubin, UA neg     Ketones, UA neg     Spec Grav, UA 1.020     Blood, UA POC neg     pH, UA 7.0     Protein, UA POC trace     Urobilinogen, UA 0.2     Leukocytes, UA neg     Nitrite, UA neg     Appearance, Fluid Clear Clear, Slightly Cloudy     Lab Results   Component Value Date    PSA 0.17 09/15/2021         IMAGING:  CT follow-up done yesterday on 5/25/2022 shows a percutaneous pigtail drain in the appropriate position at the previous right psoas perinephric collection. This appears to be resolved as it collection is significantly diminished. The prior CT just 5 days ago and certainly better than CT from 5/13/2022. 1. Perinephric abscess  Essentially resolved by CT imaging and he is having very scant amount of nonpurulent serosanguineous drainage. Therefore I removed his drain today. We will need to contact ID and make sure he gets an order for completion of additional 5 days of Invanz. Had my MA contact 's office or call there is coming hospital ourselves to get this ordered. Patient understands the risk of recurrent abscess formation and is to call should he develop fevers chills or pain. - POCT Urinalysis no Micro  - CT ABDOMEN PELVIS W WO CONTRAST Additional Contrast? Radiologist Recommendation;  Future      Orders Placed This Encounter   Procedures    CT ABDOMEN PELVIS W WO CONTRAST Additional Contrast? Radiologist Recommendation     Standing Status:   Future     Standing Expiration Date:   5/25/2023 Order Specific Question:   Additional Contrast?     Answer:   Radiologist Recommendation     Order Specific Question:   STAT Creatinine as needed:     Answer:   Yes     Order Specific Question:   Reason for exam:     Answer:   f/u right psoas and pernephric c ollection drain removed 5/25/22    POCT Urinalysis no Micro        Return in about 2 months (around 7/25/2022) for office visit after xray study. All information inputted into the note by the MA to include chief complaint, past medical history, past surgical history, medications, allergies, social and family history and review of systems has been reviewed and updated as needed by me. EMR Dragon/transcription disclaimer: Much of this documentt is electronic  transcription/translation of spoken language to printed text. The  electronic translation of spoken language may be erroneous, or at times,  nonsensical words or phrases may be inadvertently transcribed.  Although I  have reviewed the document for such errors, some may still exist.

## 2022-07-20 ENCOUNTER — HOSPITAL ENCOUNTER (OUTPATIENT)
Dept: CT IMAGING | Age: 72
Discharge: HOME | End: 2022-07-20
Payer: MEDICARE

## 2022-07-20 DIAGNOSIS — N15.1 PERINEPHRIC ABSCESS: ICD-10-CM

## 2022-07-20 LAB
GFR AFRICAN AMERICAN: 45
GFR NON-AFRICAN AMERICAN: 37
POC CREATININE: 1.8 MG/DL (ref 0.3–1.3)

## 2022-07-20 PROCEDURE — 82565 ASSAY OF CREATININE: CPT

## 2022-07-20 PROCEDURE — 6360000004 HC RX CONTRAST MEDICATION: Performed by: UROLOGY

## 2022-07-20 PROCEDURE — 74178 CT ABD&PLV WO CNTR FLWD CNTR: CPT | Performed by: RADIOLOGY

## 2022-07-20 PROCEDURE — 74178 CT ABD&PLV WO CNTR FLWD CNTR: CPT

## 2022-07-20 RX ADMIN — IOPAMIDOL 60 ML: 755 INJECTION, SOLUTION INTRAVENOUS at 10:40

## 2022-07-25 ENCOUNTER — OFFICE VISIT (OUTPATIENT)
Dept: UROLOGY | Age: 72
End: 2022-07-25
Payer: MEDICARE

## 2022-07-25 VITALS
TEMPERATURE: 98.2 F | SYSTOLIC BLOOD PRESSURE: 180 MMHG | DIASTOLIC BLOOD PRESSURE: 90 MMHG | BODY MASS INDEX: 35.04 KG/M2 | WEIGHT: 281.8 LBS | HEIGHT: 75 IN

## 2022-07-25 DIAGNOSIS — N15.1 PERINEPHRIC ABSCESS: Primary | ICD-10-CM

## 2022-07-25 LAB
APPEARANCE FLUID: CLEAR
BILIRUBIN, POC: NORMAL
BLOOD URINE, POC: NORMAL
CLARITY, POC: CLEAR
COLOR, POC: YELLOW
GLUCOSE URINE, POC: NORMAL
KETONES, POC: NORMAL
LEUKOCYTE EST, POC: NORMAL
NITRITE, POC: NORMAL
PH, POC: 6
PROTEIN, POC: NORMAL
SPECIFIC GRAVITY, POC: 1.02
UROBILINOGEN, POC: 0.2

## 2022-07-25 PROCEDURE — 99213 OFFICE O/P EST LOW 20 MIN: CPT | Performed by: UROLOGY

## 2022-07-25 PROCEDURE — 1123F ACP DISCUSS/DSCN MKR DOCD: CPT | Performed by: UROLOGY

## 2022-07-25 PROCEDURE — 81002 URINALYSIS NONAUTO W/O SCOPE: CPT | Performed by: UROLOGY

## 2022-07-25 ASSESSMENT — ENCOUNTER SYMPTOMS
CHEST TIGHTNESS: 0
FACIAL SWELLING: 0
NAUSEA: 0
BACK PAIN: 0
SORE THROAT: 0
EYE REDNESS: 0
WHEEZING: 0
EYE DISCHARGE: 0
VOMITING: 0

## 2022-07-25 NOTE — PROGRESS NOTES
Elder Ware is a 81 y.o. male who presents today   Chief Complaint   Patient presents with    Follow-up     I am here today for a 2 month fu. I had my CT done prior     Right perinephric abscess/psoas abscess follow-up  Patient here for follow-up CT scan. He originally presented with right obstructive pyelonephritis for right proximal ureteral calculus on 2/22/2022. He had a right perinephric/psoas muscle abscess requiring percutaneous drain placement. Garceno Pippins was eventually treated and he completed a 3-week course of definitive treatment however follow-up imaging done May 2, 2022 showed reaccumulation of the abscess after the drain had been removed patient underwent replacement of the drain and another course of antibiotic treatment. His drain was removed on 5/25/2022 he now comes in for follow-up CT scan. He states he feels well has had no fever he is getting some strength and endurance back. He denies any flank pain    Past Medical History:   Diagnosis Date    ACS (acute coronary syndrome) ([facility] [address] 4/27/2016 4/27/16  ACS  BRANDO RISK Score 1 (angina), AUC indication 3, AUC score 7 4/27/16  Cath  99% 1st diagonal, 2.25 x 15 YURIDIA, anterior lateral hypokinesis, EF 45%     CAD (coronary artery disease)     has seen dr. Сергей Espinoza in the past    Cerebral artery occlusion with cerebral infarction ([address] 1998    Difficulty voiding     per pt c/o.     Hyperlipidemia     Hypertension 4/27/2016    Kidney stone     MI (myocardial infarction) ([facility] [address]     stent    Renal calculus, right 8/1/2018    UTI (urinary tract infection)     due to stones; pt has been taking iv antibiotics at home; 1/5/22 last dose       Past Surgical History:   Procedure Laterality Date    BLADDER SURGERY Right 3/22/2022    CYSTOSCOPY RIGHT URETERAL STENT REMOVAL performed by Mayo Kelley MD at [address] [facility] Right 2/22/2022    CYSTOSCOPY ,URETEROSCOPY WITH URETERAL STENT INSERTION performed by Marta Gupta MD at [address] Right 3/22/2022    RIGHT URETEROSCOPY LASER LITHOTRIPSY STONE BASKET EXTRACTION performed by Marta Gupta MD at [address] / [place] Abarca / Nilesh Marshall Right 8/28/2018    CYSTOSCOPY STENT REMOVAL performed by Marta Gupta MD at [address] [employer]    LITHOTRIPSY Left 1/6/2022    LEFT RENAL EXTRACORPOREAL SHOCK WAVE LITHOTRIPSY performed by Marta Gupta MD at [facility] CIRCUMCISION,OTHER,28+ D/O N/A 8/21/2018    DORSAL SLIT performed by Marta Gupta MD at [address] [facility] STENT INSRT Right 8/21/2018    CYSTOSCOPY URETEROSCOPY LASER LITHOTRIPSY performed by Marta Gupta MD at [facility] ESWL Right 8/28/2018    ESWL EXTRACORPEAL SHOCK WAVE LITHOTRIPSY performed by [facility] Gu[facility] MD [facility]       Current Outpatient Medications   Medication Sig Dispense Refill    diclofenac (CATAFLAM) 50 MG tablet Take 50 mg by mouth 2 times daily       diphenhydrAMINE (BENADRYL) 25 MG tablet Take 25 mg by mouth nightly as needed for Sleep       lisinopril (PRINIVIL;ZESTRIL) 20 MG tablet Take 20 mg by mouth 2 times daily       tamsulosin (FLOMAX) 0.4 MG capsule Take 0.4 mg by mouth daily       CARTIA  MG extended release capsule TAKE 1 CAPSULE BY MOUTH EVERY DAY      warfarin (COUMADIN) 1 MG tablet Take 5 mg by mouth daily       metoprolol succinate (TOPROL XL) 25 MG extended release tablet Take 1 tablet by mouth daily 90 tablet 3    Diphenhydramine-APAP, sleep, (TYLENOL PM EXTRA STRENGTH PO) Take 2 capsules by mouth nightly as needed        No current facility-administered medications for this visit. Allergies   Allergen Reactions    Codeine Other (See Comments)     Describes it as a sensitivity.          Social History     Socioeconomic History    Marital status:      Spouse name: jessica gunn    Number of children: 2    Years of education: None    Highest education level: None   Occupational History    Occupation: farmer    Tobacco Use    Smoking status: Former     Packs/day: 3.00     Years: 35.00     Pack years: 105.00     Types: Cigarettes    Smokeless tobacco: Former     Quit date: 4/27/2001   Vaping Use    Vaping Use: Never used   Substance and Sexual Activity    Alcohol use: No    Drug use: No    Sexual activity: Yes     Partners: Female       Family History   Problem Relation Age of Onset    Cancer Sister     Cancer Brother     Heart Disease Father        REVIEW OF SYSTEMS:  Review of Systems   Constitutional:  Negative for chills and fever. HENT:  Negative for facial swelling and sore throat. Eyes:  Negative for discharge and redness. Respiratory:  Negative for chest tightness and wheezing. Cardiovascular:  Negative for chest pain and palpitations. Gastrointestinal:  Negative for nausea and vomiting. Endocrine: Negative for polyphagia and polyuria. Genitourinary:  Negative for decreased urine volume, difficulty urinating, dysuria, enuresis, flank pain, frequency, genital sores, hematuria, penile discharge, penile pain, penile swelling, scrotal swelling, testicular pain and urgency. Musculoskeletal:  Negative for back pain and neck stiffness. Skin:  Negative for rash and wound. Neurological:  Negative for dizziness and headaches. Hematological:  Negative for adenopathy. Does not bruise/bleed easily. Psychiatric/Behavioral:  Negative for confusion and hallucinations. PHYSICAL EXAM:  BP (!) 180/90   Temp 98.2 °F (36.8 °C)   Ht 6' 3\" (1.905 m)   Wt 281 lb 12.8 oz (127.8 kg)   BMI 35.22 kg/m²   Physical Exam  Constitutional:       General: He is not in acute distress. Appearance: Normal appearance. He is well-developed. HENT:      Head: Normocephalic and atraumatic. Nose: Nose normal.   Eyes:      General: No scleral icterus.      Conjunctiva/sclera: Conjunctivae normal.      Pupils: Pupils are equal, round, and reactive to light.   Neck:      Trachea: No tracheal deviation. Cardiovascular:      Rate and Rhythm: Normal rate and regular rhythm. Pulses: Normal pulses. Pulmonary:      Effort: Pulmonary effort is normal. No respiratory distress. Breath sounds: No stridor. Abdominal:      General: There is no distension. Palpations: Abdomen is soft. There is no mass. Tenderness: There is no abdominal tenderness. Musculoskeletal:         General: No tenderness or deformity. Normal range of motion. Cervical back: Normal range of motion and neck supple. Lymphadenopathy:      Cervical: No cervical adenopathy. Skin:     General: Skin is warm and dry. Findings: No erythema. Neurological:      General: No focal deficit present. Mental Status: He is alert and oriented to person, place, and time.    Psychiatric:         Behavior: Behavior normal.         Judgment: Judgment normal.           DATA:  CBC:   Lab Results   Component Value Date/Time    WBC 6.4 05/20/2022 12:27 PM    RBC 5.05 05/20/2022 12:27 PM    HGB 12.7 05/20/2022 12:27 PM    HCT 42.4 05/20/2022 12:27 PM    MCV 84.0 05/20/2022 12:27 PM    MCH 25.1 05/20/2022 12:27 PM    MCHC 30.0 05/20/2022 12:27 PM    RDW 15.7 05/20/2022 12:27 PM     05/20/2022 12:27 PM    MPV 10.9 05/20/2022 12:27 PM     BMP:    Lab Results   Component Value Date/Time     05/20/2022 12:27 PM    K 4.4 05/20/2022 12:27 PM    K 4.9 02/24/2022 12:14 AM     05/20/2022 12:27 PM    CO2 29 05/20/2022 12:27 PM    BUN 21 05/20/2022 12:27 PM    LABALBU 3.5 05/20/2022 12:27 PM    CREATININE 1.8 07/20/2022 10:17 AM    CREATININE 1.6 05/20/2022 12:27 PM    CALCIUM 9.4 05/20/2022 12:27 PM    GFRAA 45 07/20/2022 10:17 AM    LABGLOM 37 07/20/2022 10:17 AM    GLUCOSE 85 05/20/2022 12:27 PM     Results for orders placed or performed in visit on 07/25/22   POCT Urinalysis no Micro   Result Value Ref Range    Color, UA yellow     Clarity, UA clear     Glucose, UA POC neg Bilirubin, UA neg     Ketones, UA neg     Spec Grav, UA 1.025     Blood, UA POC trace     pH, UA 6.0     Protein, UA POC trace     Urobilinogen, UA 0.2     Leukocytes, UA neg     Nitrite, UA neg     Appearance, Fluid Clear Clear, Slightly Cloudy     Lab Results   Component Value Date    PSA 0.17 09/15/2021           IMAGING:  CT scan done on 7/20/2022 shows that the right lower pole perinephric abscess is essentially resolved. Normal-appearing right kidney no stones no hydronephrosis. 1. Perinephric abscess  Follow-up imaging currently shows this resolved. He is having no symptoms. He can follow-up to see me as needed. - POCT Urinalysis no Micro      Orders Placed This Encounter   Procedures    POCT Urinalysis no Micro        Return if symptoms worsen or fail to improve. All information inputted into the note by the MA to include chief complaint, past medical history, past surgical history, medications, allergies, social and family history and review of systems has been reviewed and updated as needed by me. EMR Dragon/transcription disclaimer: Much of this documentt is electronic  transcription/translation of spoken language to printed text. The  electronic translation of spoken language may be erroneous, or at times,  nonsensical words or phrases may be inadvertently transcribed.  Although I  have reviewed the document for such errors, some may still exist.

## 2023-02-11 NOTE — TELEPHONE ENCOUNTER
Harmeet's wife requests a call back, she wishes to know if he is to stop taking any of his medications prior to his cystoscopy on Thursday with Dr. Shelia Arias. Thank you.
Tried to call numbers. No answer or voicemails, but patient does NOT have to stop any meds for in office cysto.
yes

## 2023-10-10 ENCOUNTER — TELEPHONE (OUTPATIENT)
Dept: NEUROSURGERY | Age: 73
End: 2023-10-10

## 2023-10-10 NOTE — TELEPHONE ENCOUNTER
[facility] New Patient Questionnaire    Diagnosis/Reason for Referral?    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 Who is completing questionnaire? Patient  Caregiver Family  SPOUSE    3. Has the patient had any previous spinal/brain surgeries? NO      A. If yes, what is the name of the facility in which the surgery was performed? B. Procedure/Surgery performed? C. Who was the surgeon? D. When was the surgery? MM/YY       E. Did the patient improve after the surgery? 4. Is this a second opinion? If yes, Dr. Fong  would like to review patient first before making the appointment. 5. Have MRI Images been obtain within the last year? Yes  No      XR  CT     If yes, where was the imaging performed? [facility]   If yes, what part of the body? Lumbar  Cervical  Thoracic  Brain     If yes, when was it obtained? 10/5/23    Note: if the scan was performed at a facility other than [facility], the disc will need to be brought to the appointment or we need to reach out to obtain the disc. A. Was the patient instructed to provide the disc? Yes   No      8. Has the patient had a NCV/EMG within the last year? Yes  No     If yes, where was it performed and date? MM/YY  Location:      9. Has the patient been to Physical Therapy? Yes  No     If yes, what location, how long attended, and last visit? Location: [facility]       Therapy Lasted: 5 SESSIONS    Date of Last Visit: JULY 2023      10. Has the patient been to Pain Management? Yes  No     If yes, what location and last visit     Location:   Last Visit:   Is it helping?

## 2023-10-17 ENCOUNTER — TELEPHONE (OUTPATIENT)
Dept: NEUROSURGERY | Age: 73
End: 2023-10-17

## 2023-10-17 ENCOUNTER — HOSPITAL ENCOUNTER (OUTPATIENT)
Dept: GENERAL RADIOLOGY | Age: 73
Discharge: HOME | End: 2023-10-17
Payer: MEDICARE

## 2023-10-17 ENCOUNTER — OFFICE VISIT (OUTPATIENT)
Dept: NEUROSURGERY | Age: 73
End: 2023-10-17
Payer: MEDICARE

## 2023-10-17 VITALS
DIASTOLIC BLOOD PRESSURE: 102 MMHG | BODY MASS INDEX: 34.94 KG/M2 | SYSTOLIC BLOOD PRESSURE: 187 MMHG | HEIGHT: 75 IN | OXYGEN SATURATION: 95 % | RESPIRATION RATE: 18 BRPM | WEIGHT: 281 LBS | HEART RATE: 77 BPM

## 2023-10-17 DIAGNOSIS — M51.36 DDD (DEGENERATIVE DISC DISEASE), LUMBAR: Primary | ICD-10-CM

## 2023-10-17 DIAGNOSIS — G89.29 CHRONIC PAIN OF LEFT KNEE: ICD-10-CM

## 2023-10-17 DIAGNOSIS — R26.89 ANTALGIC GAIT: ICD-10-CM

## 2023-10-17 DIAGNOSIS — G89.29 CHRONIC MIDLINE LOW BACK PAIN WITHOUT SCIATICA: ICD-10-CM

## 2023-10-17 DIAGNOSIS — M54.50 CHRONIC MIDLINE LOW BACK PAIN WITHOUT SCIATICA: ICD-10-CM

## 2023-10-17 DIAGNOSIS — M25.562 CHRONIC PAIN OF LEFT KNEE: ICD-10-CM

## 2023-10-17 DIAGNOSIS — M51.36 DDD (DEGENERATIVE DISC DISEASE), LUMBAR: ICD-10-CM

## 2023-10-17 PROCEDURE — 1123F ACP DISCUSS/DSCN MKR DOCD: CPT | Performed by: NURSE PRACTITIONER

## 2023-10-17 PROCEDURE — 3077F SYST BP >= 140 MM HG: CPT | Performed by: NURSE PRACTITIONER

## 2023-10-17 PROCEDURE — 72120 X-RAY BEND ONLY L-S SPINE: CPT

## 2023-10-17 PROCEDURE — 3080F DIAST BP >= 90 MM HG: CPT | Performed by: NURSE PRACTITIONER

## 2023-10-17 PROCEDURE — 99204 OFFICE O/P NEW MOD 45 MIN: CPT | Performed by: NURSE PRACTITIONER

## 2023-10-17 RX ORDER — DICLOFENAC SODIUM 75 MG/1
TABLET, DELAYED RELEASE ORAL
COMMUNITY
Start: 2023-09-11

## 2023-10-17 ASSESSMENT — ENCOUNTER SYMPTOMS
EYES NEGATIVE: 1
BACK PAIN: 1
GASTROINTESTINAL NEGATIVE: 1
RESPIRATORY NEGATIVE: 1

## 2023-10-26 ENCOUNTER — TRANSCRIBE ORDERS (OUTPATIENT)
Dept: ADMINISTRATIVE | Facility: HOSPITAL | Age: 73
End: 2023-10-26
Payer: MEDICARE

## 2023-10-26 DIAGNOSIS — R94.31 ABNORMAL EKG: Primary | ICD-10-CM

## 2023-11-06 ENCOUNTER — TELEPHONE (OUTPATIENT)
Dept: NEUROSURGERY | Age: 73
End: 2023-11-06

## 2023-11-06 NOTE — TELEPHONE ENCOUNTER
Please let MrJason Caruso know that his XR of the lumbar spine we had him complete following his last appointment was stable. Follow as needed. I did not see where I sent the results before, so just making sure he knows.

## 2023-12-04 ENCOUNTER — HOSPITAL ENCOUNTER (OUTPATIENT)
Dept: CARDIOLOGY | Facility: HOSPITAL | Age: 73
Discharge: HOME | End: 2023-12-04

## 2023-12-04 VITALS
SYSTOLIC BLOOD PRESSURE: 189 MMHG | HEIGHT: 75 IN | HEART RATE: 64 BPM | WEIGHT: 291.01 LBS | DIASTOLIC BLOOD PRESSURE: 77 MMHG | BODY MASS INDEX: 36.18 KG/M2

## 2023-12-04 DIAGNOSIS — R94.31 ABNORMAL EKG: ICD-10-CM

## 2023-12-04 PROCEDURE — 0 TECHNETIUM TETROFOSMIN KIT: Performed by: NURSE PRACTITIONER

## 2023-12-04 PROCEDURE — 93017 CV STRESS TEST TRACING ONLY: CPT

## 2023-12-04 PROCEDURE — A9502 TC99M TETROFOSMIN: HCPCS | Performed by: NURSE PRACTITIONER

## 2023-12-04 PROCEDURE — 78452 HT MUSCLE IMAGE SPECT MULT: CPT

## 2023-12-04 PROCEDURE — 25010000002 REGADENOSON 0.4 MG/5ML SOLUTION: Performed by: INTERNAL MEDICINE

## 2023-12-04 RX ORDER — TIZANIDINE 4 MG/1
4 TABLET ORAL NIGHTLY PRN
COMMUNITY

## 2023-12-04 RX ORDER — FUROSEMIDE 20 MG/1
10 TABLET ORAL DAILY
COMMUNITY

## 2023-12-04 RX ORDER — DIVALPROEX SODIUM 125 MG/1
125 TABLET, DELAYED RELEASE ORAL 3 TIMES DAILY
COMMUNITY

## 2023-12-04 RX ORDER — LISINOPRIL AND HYDROCHLOROTHIAZIDE 12.5; 1 MG/1; MG/1
1 TABLET ORAL DAILY
COMMUNITY

## 2023-12-04 RX ORDER — REGADENOSON 0.08 MG/ML
0.4 INJECTION, SOLUTION INTRAVENOUS ONCE
Status: COMPLETED | OUTPATIENT
Start: 2023-12-04 | End: 2023-12-04

## 2023-12-04 RX ORDER — SENNOSIDES 8.6 MG
650 CAPSULE ORAL EVERY 8 HOURS PRN
COMMUNITY

## 2023-12-04 RX ORDER — LISINOPRIL 20 MG/1
20 TABLET ORAL DAILY
COMMUNITY

## 2023-12-04 RX ORDER — AMLODIPINE BESYLATE 10 MG/1
TABLET ORAL
COMMUNITY

## 2023-12-04 RX ORDER — GABAPENTIN 100 MG/1
100 CAPSULE ORAL 3 TIMES DAILY
COMMUNITY

## 2023-12-04 RX ORDER — TRAMADOL HYDROCHLORIDE 50 MG/1
50 TABLET ORAL EVERY 6 HOURS PRN
COMMUNITY

## 2023-12-04 RX ADMIN — REGADENOSON 0.4 MG: 0.08 INJECTION, SOLUTION INTRAVENOUS at 09:31

## 2023-12-04 RX ADMIN — TETROFOSMIN 1 DOSE: 1.38 INJECTION, POWDER, LYOPHILIZED, FOR SOLUTION INTRAVENOUS at 08:06

## 2023-12-04 RX ADMIN — TETROFOSMIN 1 DOSE: 1.38 INJECTION, POWDER, LYOPHILIZED, FOR SOLUTION INTRAVENOUS at 09:40

## 2023-12-05 LAB
BH CV STRESS BP STAGE 1: NORMAL
BH CV STRESS COMMENTS STAGE 1: NORMAL
BH CV STRESS DOSE REGADENOSON STAGE 1: 0.4
BH CV STRESS DURATION MIN STAGE 1: 0
BH CV STRESS DURATION SEC STAGE 1: 10
BH CV STRESS HR STAGE 1: 74
BH CV STRESS PROTOCOL 1: NORMAL
BH CV STRESS RECOVERY BP: NORMAL MMHG
BH CV STRESS RECOVERY HR: 67 BPM
BH CV STRESS STAGE 1: 1
LV EF NUC BP: 55 %
MAXIMAL PREDICTED HEART RATE: 149 BPM
PERCENT MAX PREDICTED HR: 49.66 %
STRESS BASELINE BP: NORMAL MMHG
STRESS BASELINE HR: 64 BPM
STRESS PERCENT HR: 58 %
STRESS POST EXERCISE DUR MIN: 0 MIN
STRESS POST EXERCISE DUR SEC: 10 SEC
STRESS POST PEAK BP: NORMAL MMHG
STRESS POST PEAK HR: 74 BPM
STRESS TARGET HR: 127 BPM

## 2024-01-26 ENCOUNTER — TELEPHONE (OUTPATIENT)
Dept: VASCULAR SURGERY | Facility: CLINIC | Age: 74
End: 2024-01-26
Payer: MEDICARE

## 2024-02-15 ENCOUNTER — HOSPITAL ENCOUNTER (OUTPATIENT)
Dept: PREADMISSION TESTING | Age: 74
Discharge: HOME | End: 2024-02-19
Payer: MEDICARE

## 2024-02-15 VITALS — BODY MASS INDEX: 36.87 KG/M2 | WEIGHT: 295 LBS

## 2024-02-15 LAB
ANION GAP SERPL CALCULATED.3IONS-SCNC: 12 MMOL/L (ref 7–19)
APTT PPP: 25.1 SEC (ref 26–36.2)
BASOPHILS # BLD: 0 K/UL (ref 0–0.2)
BASOPHILS NFR BLD: 0.5 % (ref 0–1)
BUN SERPL-MCNC: 23 MG/DL (ref 8–23)
CALCIUM SERPL-MCNC: 8.9 MG/DL (ref 8.8–10.2)
CHLORIDE SERPL-SCNC: 107 MMOL/L (ref 98–111)
CO2 SERPL-SCNC: 22 MMOL/L (ref 22–29)
CREAT SERPL-MCNC: 1.6 MG/DL (ref 0.5–1.2)
EOSINOPHIL # BLD: 0.2 K/UL (ref 0–0.6)
EOSINOPHIL NFR BLD: 2.6 % (ref 0–5)
ERYTHROCYTE [DISTWIDTH] IN BLOOD BY AUTOMATED COUNT: 13.2 % (ref 11.5–14.5)
GLUCOSE SERPL-MCNC: 96 MG/DL (ref 74–109)
HCT VFR BLD AUTO: 46.3 % (ref 42–52)
HGB BLD-MCNC: 15.1 G/DL (ref 14–18)
IMM GRANULOCYTES # BLD: 0 K/UL
INR PPP: 1.03 (ref 0.88–1.18)
LYMPHOCYTES # BLD: 1.3 K/UL (ref 1.1–4.5)
LYMPHOCYTES NFR BLD: 20.9 % (ref 20–40)
MCH RBC QN AUTO: 28.9 PG (ref 27–31)
MCHC RBC AUTO-ENTMCNC: 32.6 G/DL (ref 33–37)
MCV RBC AUTO: 88.7 FL (ref 80–94)
MONOCYTES # BLD: 0.5 K/UL (ref 0–0.9)
MONOCYTES NFR BLD: 7.2 % (ref 0–10)
MRSA DNA SPEC QL NAA+PROBE: DETECTED
NEUTROPHILS # BLD: 4.3 K/UL (ref 1.5–7.5)
NEUTS SEG NFR BLD: 68.5 % (ref 50–65)
PLATELET # BLD AUTO: 144 K/UL (ref 130–400)
PMV BLD AUTO: 12 FL (ref 9.4–12.4)
POTASSIUM SERPL-SCNC: 4.5 MMOL/L (ref 3.5–5)
PROTHROMBIN TIME: 13.2 SEC (ref 12–14.6)
RBC # BLD AUTO: 5.22 M/UL (ref 4.7–6.1)
SODIUM SERPL-SCNC: 141 MMOL/L (ref 136–145)
WBC # BLD AUTO: 6.3 K/UL (ref 4.8–10.8)

## 2024-02-15 PROCEDURE — 80048 BASIC METABOLIC PNL TOTAL CA: CPT

## 2024-02-15 PROCEDURE — 85730 THROMBOPLASTIN TIME PARTIAL: CPT

## 2024-02-15 PROCEDURE — 85610 PROTHROMBIN TIME: CPT

## 2024-02-15 PROCEDURE — G0480 DRUG TEST DEF 1-7 CLASSES: HCPCS

## 2024-02-15 PROCEDURE — 85025 COMPLETE CBC W/AUTO DIFF WBC: CPT

## 2024-02-15 PROCEDURE — 87641 MR-STAPH DNA AMP PROBE: CPT

## 2024-02-15 RX ORDER — TRAMADOL HYDROCHLORIDE 50 MG/1
50 TABLET ORAL 2 TIMES DAILY
Status: ON HOLD | COMMUNITY
End: 2024-02-20 | Stop reason: HOSPADM

## 2024-02-15 RX ORDER — TIZANIDINE 4 MG/1
4 TABLET ORAL EVERY 8 HOURS PRN
COMMUNITY

## 2024-02-15 RX ORDER — ROSUVASTATIN CALCIUM 5 MG/1
5 TABLET, COATED ORAL DAILY
COMMUNITY

## 2024-02-15 RX ORDER — DICLOFENAC SODIUM 75 MG/1
75 TABLET, DELAYED RELEASE ORAL 2 TIMES DAILY
COMMUNITY

## 2024-02-15 RX ORDER — METOPROLOL TARTRATE 100 MG/1
100 TABLET ORAL DAILY
COMMUNITY

## 2024-02-15 NOTE — DISCHARGE INSTRUCTIONS
you do not receive a phone call by 4 PM the day before your surgery please call [phone] and let them know you have not received an arrival time. If your surgery is on Monday, your call will be on the Friday before your Monday surgery.        MEDICATION INSTRUCTIONS PRIOR TO YOUR SURGERY    The morning of surgery:  DO NOT TAKE LISINOPRIL  BUT  Take your prescribed betablocker   METOPROLOL     with a sip of water the morning of surgery.    You can take all your usual prescribed medications with a small sip of water.      DO NOT TAKE ANY DIABETIC MEDICATIONS the morning of your surgery.    DO NOT TAKE ANY SUPPLEMENTS or over the counter medications the morning of  surgery.      BACTROBAN OINTMENT for the NARES    A script for Bactroban ointment has been call to your pharmacy or was given to you in written form by your surgeon.  The guidelines for the ointment use are as follows:    1)  Start using the ointment 7 days before your surgery date    2)  Use the ointment two times a day - morning and night    3)  Place the ointment on a Q-tip and swirl up in your nose making sure you cover completely       the skin just inside of each nostril.  Use one end of the Q-tip for each nostril.      Chlorhexidine Gluconate 4% Solution    Patient should shower with this soap a minimum of 3 consecutive showers (2 nights before surgery, the night before surgery and the morning of surgery) washing from the neck down (avoiding contact with genitalia).      DO NOT WASH YOUR HAIR OR FACE WITH THIS SOAP.  When washing with this soap, apply enough to suds up the body thoroughly, turn the water away from your body and allow the soap suds to remain on the body for 2 full minutes, then rinse body completely.      After using this soap on the body, please do not apply powders or lotions to your body.  After the shower the night before surgery, please dry off with a clean towel, sleep in  freshly laundered pj's, and change your bed linen

## 2024-02-19 LAB
EKG P AXIS: 34 DEGREES
EKG P-R INTERVAL: 146 MS
EKG Q-T INTERVAL: 422 MS
EKG QRS DURATION: 106 MS
EKG QTC CALCULATION (BAZETT): 434 MS
EKG T AXIS: 71 DEGREES

## 2024-02-20 ENCOUNTER — HOSPITAL ENCOUNTER (OUTPATIENT)
Age: 74
Setting detail: OUTPATIENT SURGERY
Discharge: HOME | End: 2024-02-20
Attending: ORTHOPAEDIC SURGERY | Admitting: ORTHOPAEDIC SURGERY
Payer: MEDICARE

## 2024-02-20 ENCOUNTER — ANESTHESIA EVENT (OUTPATIENT)
Dept: OPERATING ROOM | Age: 74
End: 2024-02-20
Payer: MEDICARE

## 2024-02-20 ENCOUNTER — ANESTHESIA (OUTPATIENT)
Dept: OPERATING ROOM | Age: 74
End: 2024-02-20
Payer: MEDICARE

## 2024-02-20 VITALS
WEIGHT: 295 LBS | HEART RATE: 76 BPM | RESPIRATION RATE: 16 BRPM | OXYGEN SATURATION: 94 % | TEMPERATURE: 98 F | SYSTOLIC BLOOD PRESSURE: 169 MMHG | HEIGHT: 75 IN | DIASTOLIC BLOOD PRESSURE: 89 MMHG | BODY MASS INDEX: 36.68 KG/M2

## 2024-02-20 DIAGNOSIS — M17.12 PRIMARY OSTEOARTHRITIS OF LEFT KNEE: Primary | ICD-10-CM

## 2024-02-20 PROCEDURE — 6360000002 HC RX W HCPCS: Performed by: ORTHOPAEDIC SURGERY

## 2024-02-20 PROCEDURE — 2500000003 HC RX 250 WO HCPCS: Performed by: ORTHOPAEDIC SURGERY

## 2024-02-20 PROCEDURE — 6360000002 HC RX W HCPCS: Performed by: ANESTHESIOLOGY

## 2024-02-20 PROCEDURE — 3700000000 HC ANESTHESIA ATTENDED CARE: Performed by: ORTHOPAEDIC SURGERY

## 2024-02-20 PROCEDURE — 7100000000 HC PACU RECOVERY - FIRST 15 MIN: Performed by: ORTHOPAEDIC SURGERY

## 2024-02-20 PROCEDURE — 3600000005 HC SURGERY LEVEL 5 BASE: Performed by: ORTHOPAEDIC SURGERY

## 2024-02-20 PROCEDURE — C1713 ANCHOR/SCREW BN/BN,TIS/BN: HCPCS | Performed by: ORTHOPAEDIC SURGERY

## 2024-02-20 PROCEDURE — 3700000001 HC ADD 15 MINUTES (ANESTHESIA): Performed by: ORTHOPAEDIC SURGERY

## 2024-02-20 PROCEDURE — 97116 GAIT TRAINING THERAPY: CPT

## 2024-02-20 PROCEDURE — 2580000003 HC RX 258: Performed by: ORTHOPAEDIC SURGERY

## 2024-02-20 PROCEDURE — 6360000002 HC RX W HCPCS: Performed by: NURSE ANESTHETIST, CERTIFIED REGISTERED

## 2024-02-20 PROCEDURE — 6370000000 HC RX 637 (ALT 250 FOR IP): Performed by: ORTHOPAEDIC SURGERY

## 2024-02-20 PROCEDURE — 3600000015 HC SURGERY LEVEL 5 ADDTL 15MIN: Performed by: ORTHOPAEDIC SURGERY

## 2024-02-20 PROCEDURE — 2500000003 HC RX 250 WO HCPCS: Performed by: NURSE ANESTHETIST, CERTIFIED REGISTERED

## 2024-02-20 PROCEDURE — 97161 PT EVAL LOW COMPLEX 20 MIN: CPT

## 2024-02-20 PROCEDURE — 2709999900 HC NON-CHARGEABLE SUPPLY: Performed by: ORTHOPAEDIC SURGERY

## 2024-02-20 PROCEDURE — 7100000010 HC PHASE II RECOVERY - FIRST 15 MIN: Performed by: ORTHOPAEDIC SURGERY

## 2024-02-20 PROCEDURE — 7100000001 HC PACU RECOVERY - ADDTL 15 MIN: Performed by: ORTHOPAEDIC SURGERY

## 2024-02-20 PROCEDURE — 64447 NJX AA&/STRD FEMORAL NRV IMG: CPT | Performed by: NURSE ANESTHETIST, CERTIFIED REGISTERED

## 2024-02-20 PROCEDURE — A4217 STERILE WATER/SALINE, 500 ML: HCPCS | Performed by: ORTHOPAEDIC SURGERY

## 2024-02-20 PROCEDURE — 2720000010 HC SURG SUPPLY STERILE: Performed by: ORTHOPAEDIC SURGERY

## 2024-02-20 PROCEDURE — C1776 JOINT DEVICE (IMPLANTABLE): HCPCS | Performed by: ORTHOPAEDIC SURGERY

## 2024-02-20 PROCEDURE — 6360000002 HC RX W HCPCS

## 2024-02-20 PROCEDURE — 7100000011 HC PHASE II RECOVERY - ADDTL 15 MIN: Performed by: ORTHOPAEDIC SURGERY

## 2024-02-20 DEVICE — SCREW BONE CORTICAL VIVACIT-E 2.5 X 25MM FEMALE HEX: Type: IMPLANTABLE DEVICE | Site: KNEE | Status: FUNCTIONAL

## 2024-02-20 DEVICE — COMPONENT PAT DIA41MM THK10MM ALL POLYETH CEM CONVENTIONAL: Type: IMPLANTABLE DEVICE | Site: KNEE | Status: FUNCTIONAL

## 2024-02-20 DEVICE — IMPLANTABLE DEVICE
Type: IMPLANTABLE DEVICE | Site: KNEE | Status: FUNCTIONAL
Brand: PERSONA® VIVACIT-E®

## 2024-02-20 DEVICE — PALACOS® R IS A FAST-CURING, RADIOPAQUE, POLY(METHYL METHACRYLATE)-BASED BONE CEMENT.PALACOS ® R CONTAINS THE X-RAY CONTRAST MEDIUM ZIRCONIUM DIOXIDE. TO IMPROVE VISIBILITY IN THE SURGICAL FIELD PALACOS ® R HAS BEEN COLOURED WITH CHLOROPHYLL (E141). THE BONE CEMENT IS PREPARED DIRECTLY BEFORE USE BY MIXING A POLYMER POWDER COMPONENT WITH A LIQUID MONOMER COMPONENT. A DUCTILE DOUGH FORMS WHICH CURES WITHIN A FEW MINUTES.
Type: IMPLANTABLE DEVICE | Site: KNEE | Status: FUNCTIONAL
Brand: PALACOS®

## 2024-02-20 DEVICE — IMPLANTABLE DEVICE
Type: IMPLANTABLE DEVICE | Site: KNEE | Status: FUNCTIONAL
Brand: PERSONA®

## 2024-02-20 DEVICE — PSN TIB STM 5 DEG SZ H L: Type: IMPLANTABLE DEVICE | Site: KNEE | Status: FUNCTIONAL

## 2024-02-20 RX ORDER — OXYCODONE HYDROCHLORIDE 5 MG/1
5 TABLET ORAL
Status: COMPLETED | OUTPATIENT
Start: 2024-02-20 | End: 2024-02-20

## 2024-02-20 RX ORDER — TRANEXAMIC ACID 650 MG/1
1950 TABLET ORAL
Status: COMPLETED | OUTPATIENT
Start: 2024-02-20 | End: 2024-02-20

## 2024-02-20 RX ORDER — ROPIVACAINE HYDROCHLORIDE 5 MG/ML
30 INJECTION, SOLUTION EPIDURAL; INFILTRATION; PERINEURAL ONCE
Status: DISCONTINUED | OUTPATIENT
Start: 2024-02-20 | End: 2024-02-20 | Stop reason: HOSPADM

## 2024-02-20 RX ORDER — LIDOCAINE HYDROCHLORIDE 10 MG/ML
INJECTION, SOLUTION EPIDURAL; INFILTRATION; INTRACAUDAL; PERINEURAL PRN
Status: DISCONTINUED | OUTPATIENT
Start: 2024-02-20 | End: 2024-02-20 | Stop reason: SDUPTHER

## 2024-02-20 RX ORDER — DEXAMETHASONE SODIUM PHOSPHATE 10 MG/ML
8 INJECTION, SOLUTION INTRAMUSCULAR; INTRAVENOUS ONCE
Status: DISCONTINUED | OUTPATIENT
Start: 2024-02-20 | End: 2024-02-20 | Stop reason: HOSPADM

## 2024-02-20 RX ORDER — MIDAZOLAM HYDROCHLORIDE 2 MG/2ML
2 INJECTION, SOLUTION INTRAMUSCULAR; INTRAVENOUS
Status: COMPLETED | OUTPATIENT
Start: 2024-02-20 | End: 2024-02-20

## 2024-02-20 RX ORDER — NITROGLYCERIN 20 MG/100ML
INJECTION INTRAVENOUS PRN
Status: DISCONTINUED | OUTPATIENT
Start: 2024-02-20 | End: 2024-02-20 | Stop reason: SDUPTHER

## 2024-02-20 RX ORDER — HYDROMORPHONE HYDROCHLORIDE 1 MG/ML
0.25 INJECTION, SOLUTION INTRAMUSCULAR; INTRAVENOUS; SUBCUTANEOUS EVERY 5 MIN PRN
Status: DISCONTINUED | OUTPATIENT
Start: 2024-02-20 | End: 2024-02-20 | Stop reason: HOSPADM

## 2024-02-20 RX ORDER — SODIUM CHLORIDE 0.9 % (FLUSH) 0.9 %
5-40 SYRINGE (ML) INJECTION PRN
Status: DISCONTINUED | OUTPATIENT
Start: 2024-02-20 | End: 2024-02-20 | Stop reason: HOSPADM

## 2024-02-20 RX ORDER — HYDROMORPHONE HYDROCHLORIDE 1 MG/ML
0.5 INJECTION, SOLUTION INTRAMUSCULAR; INTRAVENOUS; SUBCUTANEOUS EVERY 5 MIN PRN
Status: DISCONTINUED | OUTPATIENT
Start: 2024-02-20 | End: 2024-02-20 | Stop reason: HOSPADM

## 2024-02-20 RX ORDER — OXYCODONE HCL 10 MG/1
10 TABLET, FILM COATED, EXTENDED RELEASE ORAL ONCE
Status: COMPLETED | OUTPATIENT
Start: 2024-02-20 | End: 2024-02-20

## 2024-02-20 RX ORDER — OXYCODONE HYDROCHLORIDE 5 MG/1
5 TABLET ORAL EVERY 4 HOURS PRN
Qty: 50 TABLET | Refills: 0 | Status: SHIPPED | OUTPATIENT
Start: 2024-02-20 | End: 2024-02-28

## 2024-02-20 RX ORDER — IPRATROPIUM BROMIDE AND ALBUTEROL SULFATE 2.5; .5 MG/3ML; MG/3ML
1 SOLUTION RESPIRATORY (INHALATION)
Status: DISCONTINUED | OUTPATIENT
Start: 2024-02-20 | End: 2024-02-20 | Stop reason: HOSPADM

## 2024-02-20 RX ORDER — IBUPROFEN 400 MG/1
400 TABLET ORAL EVERY 8 HOURS PRN
Qty: 30 TABLET | Refills: 0 | Status: SHIPPED | OUTPATIENT
Start: 2024-02-20

## 2024-02-20 RX ORDER — ROCURONIUM BROMIDE 10 MG/ML
INJECTION, SOLUTION INTRAVENOUS PRN
Status: DISCONTINUED | OUTPATIENT
Start: 2024-02-20 | End: 2024-02-20 | Stop reason: SDUPTHER

## 2024-02-20 RX ORDER — SODIUM CHLORIDE 9 MG/ML
INJECTION, SOLUTION INTRAVENOUS PRN
Status: DISCONTINUED | OUTPATIENT
Start: 2024-02-20 | End: 2024-02-20 | Stop reason: HOSPADM

## 2024-02-20 RX ORDER — HYDROMORPHONE HYDROCHLORIDE 1 MG/ML
0.5 INJECTION, SOLUTION INTRAMUSCULAR; INTRAVENOUS; SUBCUTANEOUS EVERY 5 MIN PRN
Status: COMPLETED | OUTPATIENT
Start: 2024-02-20 | End: 2024-02-20

## 2024-02-20 RX ORDER — ACETAMINOPHEN 500 MG
1000 TABLET ORAL ONCE
Status: COMPLETED | OUTPATIENT
Start: 2024-02-20 | End: 2024-02-20

## 2024-02-20 RX ORDER — SODIUM CHLORIDE 0.9 % (FLUSH) 0.9 %
5-40 SYRINGE (ML) INJECTION EVERY 12 HOURS SCHEDULED
Status: DISCONTINUED | OUTPATIENT
Start: 2024-02-20 | End: 2024-02-20 | Stop reason: HOSPADM

## 2024-02-20 RX ORDER — SODIUM CHLORIDE, SODIUM LACTATE, POTASSIUM CHLORIDE, CALCIUM CHLORIDE 600; 310; 30; 20 MG/100ML; MG/100ML; MG/100ML; MG/100ML
INJECTION, SOLUTION INTRAVENOUS CONTINUOUS
Status: DISCONTINUED | OUTPATIENT
Start: 2024-02-20 | End: 2024-02-20 | Stop reason: HOSPADM

## 2024-02-20 RX ORDER — LABETALOL HYDROCHLORIDE 5 MG/ML
10 INJECTION, SOLUTION INTRAVENOUS
Status: COMPLETED | OUTPATIENT
Start: 2024-02-20 | End: 2024-02-20

## 2024-02-20 RX ORDER — GLYCOPYRROLATE 0.6MG/3ML
SYRINGE (ML) INTRAVENOUS PRN
Status: DISCONTINUED | OUTPATIENT
Start: 2024-02-20 | End: 2024-02-20 | Stop reason: SDUPTHER

## 2024-02-20 RX ORDER — ROPIVACAINE HYDROCHLORIDE 5 MG/ML
INJECTION, SOLUTION EPIDURAL; INFILTRATION; PERINEURAL
Status: COMPLETED | OUTPATIENT
Start: 2024-02-20 | End: 2024-02-20

## 2024-02-20 RX ORDER — ROPIVACAINE HYDROCHLORIDE 2 MG/ML
INJECTION, SOLUTION EPIDURAL; INFILTRATION; PERINEURAL PRN
Status: DISCONTINUED | OUTPATIENT
Start: 2024-02-20 | End: 2024-02-20 | Stop reason: HOSPADM

## 2024-02-20 RX ORDER — DIPHENHYDRAMINE HYDROCHLORIDE 50 MG/ML
12.5 INJECTION INTRAMUSCULAR; INTRAVENOUS
Status: DISCONTINUED | OUTPATIENT
Start: 2024-02-20 | End: 2024-02-20 | Stop reason: HOSPADM

## 2024-02-20 RX ORDER — EPHEDRINE SULFATE 50 MG/ML
INJECTION, SOLUTION INTRAVENOUS PRN
Status: DISCONTINUED | OUTPATIENT
Start: 2024-02-20 | End: 2024-02-20 | Stop reason: SDUPTHER

## 2024-02-20 RX ORDER — HYDRALAZINE HYDROCHLORIDE 20 MG/ML
10 INJECTION INTRAMUSCULAR; INTRAVENOUS
Status: COMPLETED | OUTPATIENT
Start: 2024-02-20 | End: 2024-02-20

## 2024-02-20 RX ORDER — LIDOCAINE HYDROCHLORIDE 10 MG/ML
1 INJECTION, SOLUTION EPIDURAL; INFILTRATION; INTRACAUDAL; PERINEURAL
Status: DISCONTINUED | OUTPATIENT
Start: 2024-02-20 | End: 2024-02-20 | Stop reason: HOSPADM

## 2024-02-20 RX ORDER — SUCCINYLCHOLINE/SOD CL,ISO/PF 100 MG/5ML
SYRINGE (ML) INTRAVENOUS PRN
Status: DISCONTINUED | OUTPATIENT
Start: 2024-02-20 | End: 2024-02-20 | Stop reason: SDUPTHER

## 2024-02-20 RX ORDER — PROPOFOL 10 MG/ML
INJECTION, EMULSION INTRAVENOUS PRN
Status: DISCONTINUED | OUTPATIENT
Start: 2024-02-20 | End: 2024-02-20 | Stop reason: SDUPTHER

## 2024-02-20 RX ORDER — VANCOMYCIN HYDROCHLORIDE 1 G/20ML
INJECTION, POWDER, LYOPHILIZED, FOR SOLUTION INTRAVENOUS PRN
Status: DISCONTINUED | OUTPATIENT
Start: 2024-02-20 | End: 2024-02-20 | Stop reason: HOSPADM

## 2024-02-20 RX ORDER — METOCLOPRAMIDE HYDROCHLORIDE 5 MG/ML
10 INJECTION INTRAMUSCULAR; INTRAVENOUS
Status: DISCONTINUED | OUTPATIENT
Start: 2024-02-20 | End: 2024-02-20 | Stop reason: HOSPADM

## 2024-02-20 RX ORDER — ASPIRIN 81 MG/1
81 TABLET, CHEWABLE ORAL 2 TIMES DAILY
Qty: 60 TABLET | Refills: 0 | Status: SHIPPED | OUTPATIENT
Start: 2024-02-20

## 2024-02-20 RX ORDER — FENTANYL CITRATE 50 UG/ML
INJECTION, SOLUTION INTRAMUSCULAR; INTRAVENOUS PRN
Status: DISCONTINUED | OUTPATIENT
Start: 2024-02-20 | End: 2024-02-20 | Stop reason: SDUPTHER

## 2024-02-20 RX ORDER — CELECOXIB 100 MG/1
100 CAPSULE ORAL ONCE
Status: COMPLETED | OUTPATIENT
Start: 2024-02-20 | End: 2024-02-20

## 2024-02-20 RX ADMIN — HYDRALAZINE HYDROCHLORIDE 10 MG: 20 INJECTION INTRAMUSCULAR; INTRAVENOUS at 09:44

## 2024-02-20 RX ADMIN — PROPOFOL 200 MG: 10 INJECTION, EMULSION INTRAVENOUS at 08:00

## 2024-02-20 RX ADMIN — HYDROMORPHONE HYDROCHLORIDE 0.5 MG: 1 INJECTION, SOLUTION INTRAMUSCULAR; INTRAVENOUS; SUBCUTANEOUS at 09:51

## 2024-02-20 RX ADMIN — NITROGLYCERIN 20 MCG: 20 INJECTION INTRAVENOUS at 08:38

## 2024-02-20 RX ADMIN — HYDROMORPHONE HYDROCHLORIDE 0.5 MG: 1 INJECTION, SOLUTION INTRAMUSCULAR; INTRAVENOUS; SUBCUTANEOUS at 10:15

## 2024-02-20 RX ADMIN — Medication 0.4 MG: at 09:10

## 2024-02-20 RX ADMIN — MIDAZOLAM 2 MG: 1 INJECTION INTRAMUSCULAR; INTRAVENOUS at 07:46

## 2024-02-20 RX ADMIN — SODIUM CHLORIDE, SODIUM LACTATE, POTASSIUM CHLORIDE, AND CALCIUM CHLORIDE: 600; 310; 30; 20 INJECTION, SOLUTION INTRAVENOUS at 09:27

## 2024-02-20 RX ADMIN — NITROGLYCERIN 20 MCG: 20 INJECTION INTRAVENOUS at 08:57

## 2024-02-20 RX ADMIN — LABETALOL HYDROCHLORIDE 10 MG: 5 INJECTION, SOLUTION INTRAVENOUS at 09:57

## 2024-02-20 RX ADMIN — OXYCODONE HYDROCHLORIDE 10 MG: 10 TABLET, FILM COATED, EXTENDED RELEASE ORAL at 06:09

## 2024-02-20 RX ADMIN — TRANEXAMIC ACID 1950 MG: 650 TABLET ORAL at 06:09

## 2024-02-20 RX ADMIN — VANCOMYCIN HYDROCHLORIDE 2500 MG: 10 INJECTION, POWDER, LYOPHILIZED, FOR SOLUTION INTRAVENOUS at 07:37

## 2024-02-20 RX ADMIN — ROCURONIUM BROMIDE 50 MG: 10 INJECTION, SOLUTION INTRAVENOUS at 08:00

## 2024-02-20 RX ADMIN — NITROGLYCERIN 40 MCG: 20 INJECTION INTRAVENOUS at 08:46

## 2024-02-20 RX ADMIN — Medication 160 MG: at 08:00

## 2024-02-20 RX ADMIN — LIDOCAINE HYDROCHLORIDE 50 MG: 10 INJECTION, SOLUTION EPIDURAL; INFILTRATION; INTRACAUDAL; PERINEURAL at 08:00

## 2024-02-20 RX ADMIN — CELECOXIB 100 MG: 100 CAPSULE ORAL at 06:09

## 2024-02-20 RX ADMIN — OXYCODONE 5 MG: 5 TABLET ORAL at 10:37

## 2024-02-20 RX ADMIN — HYDROMORPHONE HYDROCHLORIDE 0.5 MG: 1 INJECTION, SOLUTION INTRAMUSCULAR; INTRAVENOUS; SUBCUTANEOUS at 09:46

## 2024-02-20 RX ADMIN — SODIUM CHLORIDE, SODIUM LACTATE, POTASSIUM CHLORIDE, AND CALCIUM CHLORIDE: 600; 310; 30; 20 INJECTION, SOLUTION INTRAVENOUS at 06:00

## 2024-02-20 RX ADMIN — FENTANYL CITRATE 50 MCG: 0.05 INJECTION, SOLUTION INTRAMUSCULAR; INTRAVENOUS at 08:38

## 2024-02-20 RX ADMIN — FENTANYL CITRATE 50 MCG: 0.05 INJECTION, SOLUTION INTRAMUSCULAR; INTRAVENOUS at 08:25

## 2024-02-20 RX ADMIN — ROPIVACAINE HYDROCHLORIDE 20 ML: 5 INJECTION, SOLUTION EPIDURAL; INFILTRATION; PERINEURAL at 07:47

## 2024-02-20 RX ADMIN — HYDROMORPHONE HYDROCHLORIDE 0.5 MG: 1 INJECTION, SOLUTION INTRAMUSCULAR; INTRAVENOUS; SUBCUTANEOUS at 10:07

## 2024-02-20 RX ADMIN — Medication 3000 MG: at 08:09

## 2024-02-20 RX ADMIN — FENTANYL CITRATE 150 MCG: 0.05 INJECTION, SOLUTION INTRAMUSCULAR; INTRAVENOUS at 08:00

## 2024-02-20 RX ADMIN — SODIUM CHLORIDE, SODIUM LACTATE, POTASSIUM CHLORIDE, AND CALCIUM CHLORIDE: 600; 310; 30; 20 INJECTION, SOLUTION INTRAVENOUS at 06:06

## 2024-02-20 RX ADMIN — HYDROMORPHONE HYDROCHLORIDE 0.5 MG: 1 INJECTION, SOLUTION INTRAMUSCULAR; INTRAVENOUS; SUBCUTANEOUS at 10:01

## 2024-02-20 RX ADMIN — NITROGLYCERIN 40 MCG: 20 INJECTION INTRAVENOUS at 08:28

## 2024-02-20 RX ADMIN — SODIUM CHLORIDE, SODIUM LACTATE, POTASSIUM CHLORIDE, AND CALCIUM CHLORIDE: 600; 310; 30; 20 INJECTION, SOLUTION INTRAVENOUS at 05:53

## 2024-02-20 RX ADMIN — VANCOMYCIN HYDROCHLORIDE 2500 MG: 10 INJECTION, POWDER, LYOPHILIZED, FOR SOLUTION INTRAVENOUS at 06:03

## 2024-02-20 RX ADMIN — NITROGLYCERIN 40 MCG: 20 INJECTION INTRAVENOUS at 08:50

## 2024-02-20 RX ADMIN — NITROGLYCERIN 40 MCG: 20 INJECTION INTRAVENOUS at 08:31

## 2024-02-20 RX ADMIN — EPHEDRINE SULFATE 10 MG: 50 INJECTION INTRAMUSCULAR; INTRAVENOUS; SUBCUTANEOUS at 09:17

## 2024-02-20 RX ADMIN — ACETAMINOPHEN 1000 MG: 500 TABLET ORAL at 06:09

## 2024-02-20 ASSESSMENT — PAIN SCALES - GENERAL
PAINLEVEL_OUTOF10: 9
PAINLEVEL_OUTOF10: 9
PAINLEVEL_OUTOF10: 7
PAINLEVEL_OUTOF10: 10
PAINLEVEL_OUTOF10: 10

## 2024-02-20 ASSESSMENT — LIFESTYLE VARIABLES: SMOKING_STATUS: 0

## 2024-02-20 ASSESSMENT — PAIN DESCRIPTION - DESCRIPTORS
DESCRIPTORS: ACHING;DISCOMFORT
DESCRIPTORS: ACHING

## 2024-02-20 ASSESSMENT — PAIN - FUNCTIONAL ASSESSMENT
PAIN_FUNCTIONAL_ASSESSMENT: PREVENTS OR INTERFERES SOME ACTIVE ACTIVITIES AND ADLS
PAIN_FUNCTIONAL_ASSESSMENT: 0-10
PAIN_FUNCTIONAL_ASSESSMENT: 0-10

## 2024-02-20 ASSESSMENT — PAIN DESCRIPTION - ORIENTATION: ORIENTATION: LEFT

## 2024-02-20 ASSESSMENT — PAIN DESCRIPTION - LOCATION: LOCATION: KNEE

## 2024-02-20 NOTE — PROGRESS NOTES
Patient discharged home today with outpatient therapy script.  Went over all discharge instructions and new medications with patient's wife and provided a copy of all new medications to take home.  Patient's wife verbalized understanding.  Patient's stability will be assessed by PT and Op Care RN before discharging home.  Electronically signed by Chantel Garnett RN on 2/20/2024 at 9:10 AM

## 2024-02-20 NOTE — DISCHARGE INSTRUCTIONS
if  Increased redness, swelling, drainage of any kind, and/or severe pain at surgery site.  As well as new onset fevers and or chills.  These could signify an infection.  Calf tenderness to touch as well as increased swelling or redness.  This could signify a clot.  Any rash appears, increased  or new onset nausea/vomiting occur.  This may indicate a reaction to a medication.     Phone # [phone]  ext [phone].  Leave message for my assistant.  She will promptly return your call.  If you have an absolute emergency, text me with your name and problem at .  (Dr. Metzger)  Or contact Ortho Navigator at 1 [phone].  (Katarzyna)    Follow up with Surgeon at scheduled appointment time.  Thanks for the opportunity to care for you!

## 2024-02-20 NOTE — ANESTHESIA PRE PROCEDURE
DVK3CML 20.7 02/22/2022 01:40 PM    BEART -3.8 02/22/2022 01:40 PM    V7PVBQRF 95.2 02/22/2022 01:40 PM        Type & Screen (If Applicable):  No results found for: \"LABABO\", \"LABRH\"    Drug/Infectious Status (If Applicable):  No results found for: \"HIV\", \"HEPCAB\"    COVID-19 Screening (If Applicable):   Lab Results   Component Value Date/Time    COVID19 Not Detected 03/18/2022 02:30 PM           Anesthesia Evaluation  Patient summary reviewed   no history of anesthetic complications:   Airway: Mallampati: III  TM distance: >3 FB   Neck ROM: full  Mouth opening: > = 3 FB   Dental:    (+) upper dentures      Pulmonary: breath sounds clear to auscultation      (-) COPD, asthma, sleep apnea and not a current smoker (quit 1998)                           Cardiovascular:  Exercise tolerance: Knee pain limits activity    (+) hypertension: moderate, past MI (2016):, CAD:, CABG/stent (2016 stent x1):, hyperlipidemia    (-) pacemaker and dysrhythmias    ECG reviewed  Rhythm: regular  Rate: normal    Stress test reviewed       Beta Blocker:  Dose within 24 Hrs         Neuro/Psych:   (+) CVA (1998, no residual symptoms):   (-) seizures           GI/Hepatic/Renal:   (+) renal disease (Patient unaware of kidney disease, Cr elevated since 2022, hx cyst on kidney): CRI     (-) GERD and liver disease       Endo/Other:        (-) diabetes mellitus, hypothyroidism, hyperthyroidism               Abdominal:             Vascular: negative vascular ROS.         Other Findings:         Anesthesia Plan      general and regional     ASA 3     (Pre op adductor block. risks and benefits discussed including but not limited to bleeding, infection, nerve injury. Patient agrees to block.    GA -> outpatient surgery    Pre op pepcid)  Induction: intravenous.    MIPS: Postoperative opioids intended and Prophylactic antiemetics administered.  Anesthetic plan and risks discussed with patient.    Use of blood products discussed with patient whom

## 2024-02-20 NOTE — ANESTHESIA PROCEDURE NOTES
Peripheral Block    Patient location during procedure: holding area  Reason for block: post-op pain management  Start time: 2/20/2024 7:47 AM  End time: 2/20/2024 7:49 AM  Staffing  Performed: anesthesiologist   Anesthesiologist: Avila Sutton MD  Performed by: Avila Sutton MD  Authorized by: Avila Sutton MD    Preanesthetic Checklist  Completed: patient identified, IV checked, site marked, risks and benefits discussed, surgical/procedural consents, equipment checked, pre-op evaluation, timeout performed, anesthesia consent given, oxygen available, monitors applied/VS acknowledged, fire risk safety assessment completed and verbalized and blood product R/B/A discussed and consented  Peripheral Block   Patient position: supine  Prep: ChloraPrep  Provider prep: mask and sterile gloves  Patient monitoring: continuous pulse ox and frequent blood pressure checks  Block type: Femoral  Adductor canal  Laterality: left  Injection technique: single-shot  Guidance: ultrasound guided  Local infiltration: lidocaine  Local infiltration: lidocaine    Needle   Needle type: Quincke   Needle gauge: 20 G  Needle localization: ultrasound guidance  Needle length: 10 cm  Assessment   Injection assessment: negative aspiration for heme, no paresthesia on injection, local visualized surrounding nerve on ultrasound and no intravascular symptoms  Paresthesia pain: none  Slow fractionated injection: yes  Hemodynamics: stable  Real-time US image taken/store: yes  Outcomes: uncomplicated and patient tolerated procedure well    Medications Administered  ropivacaine (NAROPIN) injection 0.5% - Perineural   20 mL - 2/20/2024 7:47:00 AM

## 2024-02-20 NOTE — PROGRESS NOTES
CLINICAL PHARMACY NOTE: MEDS TO BEDS    Total # of Prescriptions Filled: 3   The following medications were delivered to the patient:  Discharge Medication List as of 2/20/2024  8:43 AM        START taking these medications    Details   aspirin (ASPIRIN CHILDRENS) 81 MG chewable tablet Take 1 tablet by mouth in the morning and at bedtime, Disp-60 tablet, R-0Normal      oxyCODONE (ROXICODONE) 5 MG immediate release tablet Take 1 tablet by mouth every 4 hours as needed for Pain for up to 8 days. Max Daily Amount: 30 mg, Disp-50 tablet, R-0Normal      ibuprofen (ADVIL;MOTRIN) 400 MG tablet Take 1 tablet by mouth every 8 hours as needed for Pain, Disp-30 tablet, R-0Normal               Additional Documentation:    Handed scripts to patients wife and patient was alert as well at [facility]. Paid with cash.

## 2024-02-20 NOTE — PROGRESS NOTES
Physical Therapy     Facility/Department: [facility] OR  Initial Assessment  PHYSICAL THERAPY EVALUATION      Harmeet Weems    : 1950  MRN: [id number]   PHYSICIAN:  Umesh Metzger*  Primary Problem    Patient Active Problem List   Diagnosis    ACS (acute coronary syndrome) ([facility])    Family history of early CAD    Essential hypertension    CAD (coronary artery disease)    Long term current use of anticoagulant therapy    Postoperative pain    UTI due to extended-spectrum beta lactamase (ESBL) producing Escherichia coli    GUICHO (acute kidney injury) ([facility])    Infected kidney cyst, right       Rehabilitation Diagnosis:     Primary osteoarthritis of left knee [M17.12]       SERVICE DATE: 2024        SUBJECTIVE: Patient states that they are planning on discharging home today    Pain Screening  Patient Currently in Pain: reports 4/10 with ambulation    PRIOR LEVEL OF FUNCTION:    [] Independent in community no assistive device     [x] Independent in community with assistive device     [] Independent in the house with assistive device     [] Transfer only    []     OBJECTIVE:  Orientation: Within functional Limits      ROM - Passive, Non-operative side     [] Left lower extremity  [x] Right lower extremity       Within functional limits    ROM - Passive, operative side    [x] Left lower extremity  [] Right lower extremity      Hip - extension to 0 degrees and flexion to 80 in sitting    Knee - extension to 0 degrees in supine and flexion to 80 in sitting        STRENGTH - Non-operative side    [] Left lower extremity  [x] Right lower extremity       Within functional limits    STRENGTH - operative side    [x] Left lower extremity  [] Right lower extremity    Grossly  3-/5        TRANSFERS   Sit to stand     [] CGA [x] Minimum        Bed to chair     [] CGA [x] Minimum    Bed mobility   Supine to sit      [] CGA [x] Minimum      Scoot  [] Side to side  [] Up and down     [] CGA [] Minimum    AMBULATION   Weight

## 2024-02-20 NOTE — H&P
INR 1.03 02/15/2024 02:57 PM     U/A:   Lab Results   Component Value Date/Time    NITRITE neg 07/25/2022 12:00 AM    WBCUA TNTC 02/22/2022 03:38 PM    RBCUA 8 03/15/2022 12:00 AM    RBCUA 11-15 02/22/2022 03:38 PM    BACTERIA 0 03/15/2022 12:00 AM    BACTERIA 3+ 02/22/2022 03:38 PM     HgBA1c:  No components found for: \"HGBA1C\"    IMPRESSION:   Left primary knee osteoarthritis. Most recent office note reviewed and there has been no change in health status.       PLAN: Left knee replacement.  I explained to the patient/family the patient's diagnosis and operative procedure in detail. They said they understood basically what was wrong and how I planned to fix it.  They understand the expected recovery and the risks which include excessive bleeding, infection, reaction to anesthesia, nerve injury, stiffness, fracture, deformity and dislocation.  They then signed an operative consent form.      Provider: RACHEL MARVIN MD  Date: 2/20/2024

## 2024-02-20 NOTE — DISCHARGE INSTR - DIET
Good nutrition is important when healing from an illness, injury, or surgery.  Follow any nutrition recommendations given to you during your hospital stay.   If you were given an oral nutrition supplement while in the hospital, continue to take this supplement at home.  You can take it with meals, in-between meals, and/or before bedtime. These supplements can be purchased at most local grocery stores, pharmacies, and chain [employer]-stores.   If you have any questions about your diet or nutrition, call the hospital and ask for the dietitian.            Low carb / High protein

## 2024-02-20 NOTE — ANESTHESIA POSTPROCEDURE EVALUATION
Department of Anesthesiology  Postprocedure Note    Patient: Harmeet Weems  MRN: [id number]  YOB: 1950  Date of evaluation: 2/20/2024    Procedure Summary       Date: 02/20/24 Room / Location: [address]    Anesthesia Start: 0754 Anesthesia Stop: 0942    Procedure: ROBOTIC LEFT TOTAL KNEE ARTHROPLASTY (Left: Knee) Diagnosis:       Primary osteoarthritis of left knee      (Primary osteoarthritis of left knee [M17.12])    Surgeons: Umesh Metzger MD Responsible Provider: Luis Enrique Hubbard APRN - CRNA    Anesthesia Type: general, regional ASA Status: 3            Anesthesia Type: No value filed.    Renny Phase I:      Renny Phase II:      Anesthesia Post Evaluation    Patient location during evaluation: PACU  Patient participation: complete - patient participated  Level of consciousness: sleepy but conscious  Pain score: 0  Airway patency: patent  Nausea & Vomiting: no nausea  Cardiovascular status: hemodynamically stable  Respiratory status: acceptable  Hydration status: euvolemic  Pain management: adequate    No notable events documented.

## 2024-02-20 NOTE — OP NOTE
TOTAL KNEE ARTHROPLASTY OPERATIVE NOTE    NAME OF SURGEON / : Tray Murillo MD  PATIENT:   Harmeet Weems  Date: 2/20/2024        Time: 9:15 AM   Referring Physician: ________________________    PREOP DIAGNOSIS:  left Knee  Primary osteoarthritis   POSTOP DIAGNOSIS:  Same     PROCEDURE:    Left  Cemented Posterior Stabilized Knee arthroplasty, PAUL robot assisted    IMPLANTS:   Implant Name Type Inv. Item Serial No.  Lot No. LRB No. Used Action   SCREW BONE CORTICAL VIVACIT-E 2.5 X 25MM FEMALE HEX - [id number] Screw/Plate/Nail/David SCREW BONE CORTICAL VIVACIT-E 2.5 X 25MM FEMALE HEX  [employer] [id number] Left 1 Implanted   CEMENT BONE 40GM HI VISC PALACOS R - [id number]  CEMENT BONE 40GM HI VISC PALACOS R  [employer]- [id number] Left 1 Implanted   COMPONENT FEM SZ 11 STD L KNEE CO CHROM MATTHEW POST STBL MID - [id number]  COMPONENT FEM SZ 11 STD L KNEE CO CHROM MATTHEW POST STBL MID  [employer]- [id number] Left 1 Implanted   PSN TIB STM 5 DEG SZ H L - [id number]  PSN TIB STM 5 DEG SZ H L  [employer]- [id number] Left 1 Implanted   EXTENSION STEM L30MM [id number] KNEE TAPR MATTHEW PERSONA - [id number]  EXTENSION STEM L30MM [id number] KNEE TAPR MATTHEW PERSONA  [employer]- [id number] Left 1 Implanted   COMPONENT PAT GEJ30FT QFR02OE ALL POLYETH MATTHEW CONVENTIONAL - [id number]  COMPONENT PAT [id number] [id number] ALL POLYETH MATTHEW CONVENTIONAL  [employer]- [id number] Left 1 Implanted   CEMENT BONE 40GM HI VISC PALACOS R - [id number]  CEMENT BONE 40GM HI VISC PALACOS R  [employer]- [id number] Left 1 Implanted   SURFACE ARTC TIB GH FEM 10-12 H12MM LT KNEE VIVACIT-E - [id number]  SURFACE ARTC TIB GH FEM 10-12 H12MM LT KNEE VIVACIT-E  [employer]- [id number] Left 1 Implanted       FINDINGS:  Preop ROM:  Full except for   - 10 degrees  extension  Alignment:    valgus  Bone quality:  very soft  Cartilage wear:  Medial - mild  Lateral - severe  Pat-fem

## 2024-02-21 LAB
COTININE SERPL-MCNC: <5 NG/ML
NICOTINE SERPL-MCNC: <5 NG/ML

## 2024-02-26 ENCOUNTER — TELEPHONE (OUTPATIENT)
Dept: INPATIENT UNIT | Age: 74
End: 2024-02-26

## 2024-04-17 ENCOUNTER — HOSPITAL ENCOUNTER (INPATIENT)
Age: 74
LOS: 1 days | Discharge: HOME | DRG: 322 | End: 2024-04-18
Attending: EMERGENCY MEDICINE | Admitting: INTERNAL MEDICINE
Payer: MEDICARE

## 2024-04-17 DIAGNOSIS — I21.19 ACUTE ST ELEVATION MYOCARDIAL INFARCTION (STEMI) INVOLVING OTHER CORONARY ARTERY OF INFERIOR WALL (HCC): Primary | ICD-10-CM

## 2024-04-17 PROBLEM — I21.3 STEMI (ST ELEVATION MYOCARDIAL INFARCTION) (HCC): Status: ACTIVE | Noted: 2024-04-17

## 2024-04-17 LAB
ALBUMIN SERPL-MCNC: 3.7 G/DL (ref 3.5–5.2)
ALP SERPL-CCNC: 91 U/L (ref 40–130)
ALT SERPL-CCNC: 27 U/L (ref 5–41)
ANION GAP SERPL CALCULATED.3IONS-SCNC: 13 MMOL/L (ref 7–19)
APTT PPP: >200 SEC (ref 26–36.2)
AST SERPL-CCNC: 34 U/L (ref 5–40)
BASOPHILS # BLD: 0 K/UL (ref 0–0.2)
BASOPHILS NFR BLD: 0.2 % (ref 0–1)
BILIRUB SERPL-MCNC: 0.3 MG/DL (ref 0.2–1.2)
BNP BLD-MCNC: 347 PG/ML (ref 0–124)
BUN SERPL-MCNC: 22 MG/DL (ref 8–23)
CALCIUM SERPL-MCNC: 8.9 MG/DL (ref 8.8–10.2)
CHLORIDE SERPL-SCNC: 102 MMOL/L (ref 98–111)
CHOLEST SERPL-MCNC: 172 MG/DL (ref 160–199)
CO2 SERPL-SCNC: 19 MMOL/L (ref 22–29)
CREAT SERPL-MCNC: 1.7 MG/DL (ref 0.5–1.2)
D DIMER PPP FEU-MCNC: 1.86 UG/ML FEU (ref 0–0.48)
EOSINOPHIL # BLD: 0 K/UL (ref 0–0.6)
EOSINOPHIL NFR BLD: 0.1 % (ref 0–5)
ERYTHROCYTE [DISTWIDTH] IN BLOOD BY AUTOMATED COUNT: 12.8 % (ref 11.5–14.5)
GLUCOSE SERPL-MCNC: 122 MG/DL (ref 74–109)
HBA1C MFR BLD: 4.9 % (ref 4–6)
HCT VFR BLD AUTO: 47.8 % (ref 42–52)
HDLC SERPL-MCNC: 33 MG/DL (ref 55–121)
HGB BLD-MCNC: 14.3 G/DL (ref 14–18)
IMM GRANULOCYTES # BLD: 0 K/UL
INR PPP: 8.27 (ref 0.88–1.18)
LDLC SERPL CALC-MCNC: 121 MG/DL
LYMPHOCYTES # BLD: 0.6 K/UL (ref 1.1–4.5)
LYMPHOCYTES NFR BLD: 5.9 % (ref 20–40)
MAGNESIUM SERPL-MCNC: 2 MG/DL (ref 1.6–2.4)
MCH RBC QN AUTO: 27.6 PG (ref 27–31)
MCHC RBC AUTO-ENTMCNC: 29.9 G/DL (ref 33–37)
MCV RBC AUTO: 92.3 FL (ref 80–94)
MONOCYTES # BLD: 0.4 K/UL (ref 0–0.9)
MONOCYTES NFR BLD: 3.6 % (ref 0–10)
NEUTROPHILS # BLD: 9.1 K/UL (ref 1.5–7.5)
NEUTS SEG NFR BLD: 89.9 % (ref 50–65)
PHOSPHATE SERPL-MCNC: 2.9 MG/DL (ref 2.5–4.5)
PLATELET # BLD AUTO: 146 K/UL (ref 130–400)
PMV BLD AUTO: 11.9 FL (ref 9.4–12.4)
POTASSIUM SERPL-SCNC: 4.2 MMOL/L (ref 3.5–5)
PROT SERPL-MCNC: 7.7 G/DL (ref 6.6–8.7)
PROTHROMBIN TIME: 67.2 SEC (ref 12–14.6)
RBC # BLD AUTO: 5.18 M/UL (ref 4.7–6.1)
SODIUM SERPL-SCNC: 134 MMOL/L (ref 136–145)
TRIGL SERPL-MCNC: 91 MG/DL (ref 0–149)
TROPONIN, HIGH SENSITIVITY: 337 NG/L (ref 0–22)
TSH SERPL DL<=0.005 MIU/L-ACNC: 2.28 UIU/ML (ref 0.27–4.2)
WBC # BLD AUTO: 10.1 K/UL (ref 4.8–10.8)

## 2024-04-17 PROCEDURE — 36415 COLL VENOUS BLD VENIPUNCTURE: CPT

## 2024-04-17 PROCEDURE — 83880 ASSAY OF NATRIURETIC PEPTIDE: CPT

## 2024-04-17 PROCEDURE — 84443 ASSAY THYROID STIM HORMONE: CPT

## 2024-04-17 PROCEDURE — 83036 HEMOGLOBIN GLYCOSYLATED A1C: CPT

## 2024-04-17 PROCEDURE — 4A023N7 MEASUREMENT OF CARDIAC SAMPLING AND PRESSURE, LEFT HEART, PERCUTANEOUS APPROACH: ICD-10-PCS | Performed by: INTERNAL MEDICINE

## 2024-04-17 PROCEDURE — 2709999900 HC NON-CHARGEABLE SUPPLY

## 2024-04-17 PROCEDURE — C1725 CATH, TRANSLUMIN NON-LASER: HCPCS

## 2024-04-17 PROCEDURE — 2500000003 HC RX 250 WO HCPCS

## 2024-04-17 PROCEDURE — 2580000003 HC RX 258: Performed by: INTERNAL MEDICINE

## 2024-04-17 PROCEDURE — 92941 PRQ TRLML REVSC TOT OCCL AMI: CPT

## 2024-04-17 PROCEDURE — C1887 CATHETER, GUIDING: HCPCS

## 2024-04-17 PROCEDURE — 99152 MOD SED SAME PHYS/QHP 5/>YRS: CPT

## 2024-04-17 PROCEDURE — B2110ZZ FLUOROSCOPY OF MULTIPLE CORONARY ARTERIES USING HIGH OSMOLAR CONTRAST: ICD-10-PCS | Performed by: INTERNAL MEDICINE

## 2024-04-17 PROCEDURE — 6370000000 HC RX 637 (ALT 250 FOR IP)

## 2024-04-17 PROCEDURE — 92978 ENDOLUMINL IVUS OCT C 1ST: CPT

## 2024-04-17 PROCEDURE — 99153 MOD SED SAME PHYS/QHP EA: CPT

## 2024-04-17 PROCEDURE — 80061 LIPID PANEL: CPT

## 2024-04-17 PROCEDURE — 85610 PROTHROMBIN TIME: CPT

## 2024-04-17 PROCEDURE — B2150ZZ FLUOROSCOPY OF LEFT HEART USING HIGH OSMOLAR CONTRAST: ICD-10-PCS | Performed by: INTERNAL MEDICINE

## 2024-04-17 PROCEDURE — 93458 L HRT ARTERY/VENTRICLE ANGIO: CPT

## 2024-04-17 PROCEDURE — C1874 STENT, COATED/COV W/DEL SYS: HCPCS

## 2024-04-17 PROCEDURE — 80053 COMPREHEN METABOLIC PANEL: CPT

## 2024-04-17 PROCEDURE — B241ZZ3 ULTRASONOGRAPHY OF MULTIPLE CORONARY ARTERIES, INTRAVASCULAR: ICD-10-PCS | Performed by: INTERNAL MEDICINE

## 2024-04-17 PROCEDURE — 85025 COMPLETE CBC W/AUTO DIFF WBC: CPT

## 2024-04-17 PROCEDURE — 6360000002 HC RX W HCPCS

## 2024-04-17 PROCEDURE — C1894 INTRO/SHEATH, NON-LASER: HCPCS

## 2024-04-17 PROCEDURE — 99285 EMERGENCY DEPT VISIT HI MDM: CPT

## 2024-04-17 PROCEDURE — 6370000000 HC RX 637 (ALT 250 FOR IP): Performed by: HOSPITALIST

## 2024-04-17 PROCEDURE — 84100 ASSAY OF PHOSPHORUS: CPT

## 2024-04-17 PROCEDURE — 99291 CRITICAL CARE FIRST HOUR: CPT | Performed by: INTERNAL MEDICINE

## 2024-04-17 PROCEDURE — 84484 ASSAY OF TROPONIN QUANT: CPT

## 2024-04-17 PROCEDURE — 6370000000 HC RX 637 (ALT 250 FOR IP): Performed by: INTERNAL MEDICINE

## 2024-04-17 PROCEDURE — 2000000000 HC ICU R&B

## 2024-04-17 PROCEDURE — 85730 THROMBOPLASTIN TIME PARTIAL: CPT

## 2024-04-17 PROCEDURE — 85379 FIBRIN DEGRADATION QUANT: CPT

## 2024-04-17 PROCEDURE — 83735 ASSAY OF MAGNESIUM: CPT

## 2024-04-17 PROCEDURE — C1753 CATH, INTRAVAS ULTRASOUND: HCPCS

## 2024-04-17 PROCEDURE — C1769 GUIDE WIRE: HCPCS

## 2024-04-17 PROCEDURE — 6360000004 HC RX CONTRAST MEDICATION: Performed by: INTERNAL MEDICINE

## 2024-04-17 PROCEDURE — 027035Z DILATION OF CORONARY ARTERY, ONE ARTERY WITH TWO DRUG-ELUTING INTRALUMINAL DEVICES, PERCUTANEOUS APPROACH: ICD-10-PCS | Performed by: INTERNAL MEDICINE

## 2024-04-17 RX ORDER — TIZANIDINE 4 MG/1
4 TABLET ORAL EVERY 8 HOURS PRN
Status: DISCONTINUED | OUTPATIENT
Start: 2024-04-17 | End: 2024-04-18 | Stop reason: HOSPADM

## 2024-04-17 RX ORDER — SODIUM CHLORIDE 0.9 % (FLUSH) 0.9 %
5-40 SYRINGE (ML) INJECTION EVERY 12 HOURS SCHEDULED
Status: DISCONTINUED | OUTPATIENT
Start: 2024-04-17 | End: 2024-04-18 | Stop reason: SDUPTHER

## 2024-04-17 RX ORDER — ACETAMINOPHEN 325 MG/1
650 TABLET ORAL EVERY 4 HOURS PRN
Status: DISCONTINUED | OUTPATIENT
Start: 2024-04-17 | End: 2024-04-18 | Stop reason: HOSPADM

## 2024-04-17 RX ORDER — MAGNESIUM SULFATE IN WATER 40 MG/ML
2000 INJECTION, SOLUTION INTRAVENOUS PRN
Status: DISCONTINUED | OUTPATIENT
Start: 2024-04-17 | End: 2024-04-18 | Stop reason: HOSPADM

## 2024-04-17 RX ORDER — POTASSIUM CHLORIDE 7.45 MG/ML
10 INJECTION INTRAVENOUS PRN
Status: DISCONTINUED | OUTPATIENT
Start: 2024-04-17 | End: 2024-04-18 | Stop reason: HOSPADM

## 2024-04-17 RX ORDER — ASPIRIN 81 MG/1
81 TABLET, CHEWABLE ORAL DAILY
Status: DISCONTINUED | OUTPATIENT
Start: 2024-04-18 | End: 2024-04-17 | Stop reason: SDUPTHER

## 2024-04-17 RX ORDER — CLOPIDOGREL BISULFATE 75 MG/1
75 TABLET ORAL DAILY
Status: DISCONTINUED | OUTPATIENT
Start: 2024-04-18 | End: 2024-04-18 | Stop reason: SDUPTHER

## 2024-04-17 RX ORDER — ATORVASTATIN CALCIUM 80 MG/1
80 TABLET, FILM COATED ORAL NIGHTLY
Status: DISCONTINUED | OUTPATIENT
Start: 2024-04-17 | End: 2024-04-18 | Stop reason: HOSPADM

## 2024-04-17 RX ORDER — SODIUM CHLORIDE 9 MG/ML
INJECTION, SOLUTION INTRAVENOUS PRN
Status: DISCONTINUED | OUTPATIENT
Start: 2024-04-17 | End: 2024-04-18 | Stop reason: HOSPADM

## 2024-04-17 RX ORDER — CALCIUM CARBONATE 500 MG/1
500 TABLET, CHEWABLE ORAL 3 TIMES DAILY PRN
Status: DISCONTINUED | OUTPATIENT
Start: 2024-04-17 | End: 2024-04-18 | Stop reason: HOSPADM

## 2024-04-17 RX ORDER — SODIUM CHLORIDE 9 MG/ML
INJECTION, SOLUTION INTRAVENOUS PRN
Status: DISCONTINUED | OUTPATIENT
Start: 2024-04-17 | End: 2024-04-18 | Stop reason: SDUPTHER

## 2024-04-17 RX ORDER — IODIXANOL 320 MG/ML
366 INJECTION, SOLUTION INTRAVASCULAR
Status: COMPLETED | OUTPATIENT
Start: 2024-04-17 | End: 2024-04-17

## 2024-04-17 RX ORDER — ENOXAPARIN SODIUM 100 MG/ML
40 INJECTION SUBCUTANEOUS DAILY
Status: DISCONTINUED | OUTPATIENT
Start: 2024-04-18 | End: 2024-04-18

## 2024-04-17 RX ORDER — POLYETHYLENE GLYCOL 3350 17 G/17G
17 POWDER, FOR SOLUTION ORAL DAILY PRN
Status: DISCONTINUED | OUTPATIENT
Start: 2024-04-17 | End: 2024-04-18 | Stop reason: HOSPADM

## 2024-04-17 RX ORDER — SODIUM CHLORIDE 0.9 % (FLUSH) 0.9 %
5-40 SYRINGE (ML) INJECTION PRN
Status: DISCONTINUED | OUTPATIENT
Start: 2024-04-17 | End: 2024-04-18 | Stop reason: SDUPTHER

## 2024-04-17 RX ORDER — ONDANSETRON 2 MG/ML
4 INJECTION INTRAMUSCULAR; INTRAVENOUS EVERY 6 HOURS PRN
Status: DISCONTINUED | OUTPATIENT
Start: 2024-04-17 | End: 2024-04-18 | Stop reason: HOSPADM

## 2024-04-17 RX ORDER — SODIUM CHLORIDE 0.9 % (FLUSH) 0.9 %
5-40 SYRINGE (ML) INJECTION EVERY 12 HOURS SCHEDULED
Status: DISCONTINUED | OUTPATIENT
Start: 2024-04-17 | End: 2024-04-18

## 2024-04-17 RX ORDER — NITROGLYCERIN 0.4 MG/1
0.4 TABLET SUBLINGUAL EVERY 5 MIN PRN
Status: DISCONTINUED | OUTPATIENT
Start: 2024-04-17 | End: 2024-04-18 | Stop reason: HOSPADM

## 2024-04-17 RX ORDER — ASPIRIN 81 MG/1
81 TABLET, CHEWABLE ORAL DAILY
Status: DISCONTINUED | OUTPATIENT
Start: 2024-04-18 | End: 2024-04-18 | Stop reason: HOSPADM

## 2024-04-17 RX ORDER — IBUPROFEN 400 MG/1
400 TABLET ORAL EVERY 8 HOURS PRN
Status: DISCONTINUED | OUTPATIENT
Start: 2024-04-17 | End: 2024-04-18 | Stop reason: HOSPADM

## 2024-04-17 RX ORDER — METOPROLOL TARTRATE 50 MG/1
100 TABLET, FILM COATED ORAL DAILY
Status: DISCONTINUED | OUTPATIENT
Start: 2024-04-18 | End: 2024-04-18

## 2024-04-17 RX ORDER — LISINOPRIL 5 MG/1
5 TABLET ORAL DAILY
Status: DISCONTINUED | OUTPATIENT
Start: 2024-04-18 | End: 2024-04-17 | Stop reason: SDUPTHER

## 2024-04-17 RX ORDER — ROSUVASTATIN CALCIUM 20 MG/1
40 TABLET, COATED ORAL NIGHTLY
Status: DISCONTINUED | OUTPATIENT
Start: 2024-04-17 | End: 2024-04-17 | Stop reason: ALTCHOICE

## 2024-04-17 RX ORDER — METOPROLOL SUCCINATE 25 MG/1
25 TABLET, EXTENDED RELEASE ORAL DAILY
Status: DISCONTINUED | OUTPATIENT
Start: 2024-04-18 | End: 2024-04-17 | Stop reason: SDUPTHER

## 2024-04-17 RX ORDER — ACETAMINOPHEN 500 MG
1000 TABLET ORAL EVERY 8 HOURS SCHEDULED
Status: DISCONTINUED | OUTPATIENT
Start: 2024-04-17 | End: 2024-04-18 | Stop reason: HOSPADM

## 2024-04-17 RX ORDER — SODIUM CHLORIDE 0.9 % (FLUSH) 0.9 %
5-40 SYRINGE (ML) INJECTION PRN
Status: DISCONTINUED | OUTPATIENT
Start: 2024-04-17 | End: 2024-04-18 | Stop reason: HOSPADM

## 2024-04-17 RX ORDER — ONDANSETRON 4 MG/1
4 TABLET, ORALLY DISINTEGRATING ORAL EVERY 8 HOURS PRN
Status: DISCONTINUED | OUTPATIENT
Start: 2024-04-17 | End: 2024-04-18 | Stop reason: HOSPADM

## 2024-04-17 RX ORDER — MECOBALAMIN 5000 MCG
5 TABLET,DISINTEGRATING ORAL NIGHTLY PRN
Status: DISCONTINUED | OUTPATIENT
Start: 2024-04-17 | End: 2024-04-18 | Stop reason: HOSPADM

## 2024-04-17 RX ORDER — IODIXANOL 320 MG/ML
100 INJECTION, SOLUTION INTRAVASCULAR
Status: DISCONTINUED | OUTPATIENT
Start: 2024-04-17 | End: 2024-04-17

## 2024-04-17 RX ORDER — CLOPIDOGREL 300 MG/1
600 TABLET, FILM COATED ORAL ONCE
Status: DISCONTINUED | OUTPATIENT
Start: 2024-04-17 | End: 2024-04-18 | Stop reason: SDUPTHER

## 2024-04-17 RX ORDER — LISINOPRIL 20 MG/1
20 TABLET ORAL 2 TIMES DAILY
Status: DISCONTINUED | OUTPATIENT
Start: 2024-04-17 | End: 2024-04-18 | Stop reason: HOSPADM

## 2024-04-17 RX ORDER — POTASSIUM CHLORIDE 20 MEQ/1
40 TABLET, EXTENDED RELEASE ORAL PRN
Status: DISCONTINUED | OUTPATIENT
Start: 2024-04-17 | End: 2024-04-18 | Stop reason: HOSPADM

## 2024-04-17 RX ORDER — SODIUM CHLORIDE 9 MG/ML
INJECTION, SOLUTION INTRAVENOUS CONTINUOUS
Status: DISCONTINUED | OUTPATIENT
Start: 2024-04-17 | End: 2024-04-18 | Stop reason: HOSPADM

## 2024-04-17 RX ADMIN — ACETAMINOPHEN 1000 MG: 500 TABLET ORAL at 22:45

## 2024-04-17 RX ADMIN — SODIUM CHLORIDE, PRESERVATIVE FREE 10 ML: 5 INJECTION INTRAVENOUS at 22:47

## 2024-04-17 RX ADMIN — TICAGRELOR 90 MG: 90 TABLET ORAL at 22:46

## 2024-04-17 RX ADMIN — LISINOPRIL 20 MG: 20 TABLET ORAL at 22:47

## 2024-04-17 RX ADMIN — IODIXANOL 366 ML: 320 INJECTION, SOLUTION INTRAVASCULAR at 21:36

## 2024-04-17 RX ADMIN — ATORVASTATIN CALCIUM 80 MG: 80 TABLET, FILM COATED ORAL at 22:00

## 2024-04-17 RX ADMIN — TIZANIDINE 4 MG: 4 TABLET ORAL at 22:46

## 2024-04-17 RX ADMIN — Medication 5 MG: at 22:45

## 2024-04-17 RX ADMIN — SODIUM CHLORIDE: 9 INJECTION, SOLUTION INTRAVENOUS at 22:14

## 2024-04-17 ASSESSMENT — PAIN SCALES - GENERAL
PAINLEVEL_OUTOF10: 0
PAINLEVEL_OUTOF10: 0

## 2024-04-17 ASSESSMENT — PAIN SCALES - WONG BAKER: WONGBAKER_NUMERICALRESPONSE: NO HURT

## 2024-04-18 VITALS
WEIGHT: 287.92 LBS | RESPIRATION RATE: 21 BRPM | OXYGEN SATURATION: 97 % | HEIGHT: 75 IN | BODY MASS INDEX: 35.8 KG/M2 | SYSTOLIC BLOOD PRESSURE: 164 MMHG | TEMPERATURE: 97.7 F | HEART RATE: 50 BPM | DIASTOLIC BLOOD PRESSURE: 88 MMHG

## 2024-04-18 LAB
ALBUMIN SERPL-MCNC: 3.8 G/DL (ref 3.5–5.2)
ALP SERPL-CCNC: 86 U/L (ref 40–130)
ALT SERPL-CCNC: 26 U/L (ref 5–41)
ANION GAP SERPL CALCULATED.3IONS-SCNC: 11 MMOL/L (ref 7–19)
APTT PPP: 48.9 SEC (ref 26–36.2)
AST SERPL-CCNC: 58 U/L (ref 5–40)
BASOPHILS # BLD: 0 K/UL (ref 0–0.2)
BASOPHILS NFR BLD: 0.3 % (ref 0–1)
BILIRUB SERPL-MCNC: 0.3 MG/DL (ref 0.2–1.2)
BNP BLD-MCNC: 1025 PG/ML (ref 0–124)
BUN SERPL-MCNC: 22 MG/DL (ref 8–23)
CALCIUM SERPL-MCNC: 8.5 MG/DL (ref 8.8–10.2)
CHLORIDE SERPL-SCNC: 102 MMOL/L (ref 98–111)
CO2 SERPL-SCNC: 24 MMOL/L (ref 22–29)
CREAT SERPL-MCNC: 1.7 MG/DL (ref 0.5–1.2)
EOSINOPHIL # BLD: 0 K/UL (ref 0–0.6)
EOSINOPHIL NFR BLD: 0.3 % (ref 0–5)
ERYTHROCYTE [DISTWIDTH] IN BLOOD BY AUTOMATED COUNT: 12.8 % (ref 11.5–14.5)
GLUCOSE SERPL-MCNC: 124 MG/DL (ref 74–109)
HCT VFR BLD AUTO: 38.8 % (ref 42–52)
HGB BLD-MCNC: 12.9 G/DL (ref 14–18)
IMM GRANULOCYTES # BLD: 0 K/UL
INR PPP: 1.3 (ref 0.88–1.18)
LYMPHOCYTES # BLD: 1.2 K/UL (ref 1.1–4.5)
LYMPHOCYTES NFR BLD: 15.6 % (ref 20–40)
MAGNESIUM SERPL-MCNC: 2.1 MG/DL (ref 1.6–2.4)
MCH RBC QN AUTO: 28.1 PG (ref 27–31)
MCHC RBC AUTO-ENTMCNC: 33.2 G/DL (ref 33–37)
MCV RBC AUTO: 84.5 FL (ref 80–94)
MONOCYTES # BLD: 0.4 K/UL (ref 0–0.9)
MONOCYTES NFR BLD: 5.1 % (ref 0–10)
NEUTROPHILS # BLD: 5.8 K/UL (ref 1.5–7.5)
NEUTS SEG NFR BLD: 78.4 % (ref 50–65)
PHOSPHATE SERPL-MCNC: 2.9 MG/DL (ref 2.5–4.5)
PLATELET # BLD AUTO: 150 K/UL (ref 130–400)
PMV BLD AUTO: 12.1 FL (ref 9.4–12.4)
POTASSIUM SERPL-SCNC: 4.5 MMOL/L (ref 3.5–5)
PROT SERPL-MCNC: 6.7 G/DL (ref 6.6–8.7)
PROTHROMBIN TIME: 15.9 SEC (ref 12–14.6)
RBC # BLD AUTO: 4.59 M/UL (ref 4.7–6.1)
SODIUM SERPL-SCNC: 137 MMOL/L (ref 136–145)
WBC # BLD AUTO: 7.4 K/UL (ref 4.8–10.8)

## 2024-04-18 PROCEDURE — 6370000000 HC RX 637 (ALT 250 FOR IP): Performed by: HOSPITALIST

## 2024-04-18 PROCEDURE — 85610 PROTHROMBIN TIME: CPT

## 2024-04-18 PROCEDURE — 6370000000 HC RX 637 (ALT 250 FOR IP): Performed by: INTERNAL MEDICINE

## 2024-04-18 PROCEDURE — 85025 COMPLETE CBC W/AUTO DIFF WBC: CPT

## 2024-04-18 PROCEDURE — C8929 TTE W OR WO FOL WCON,DOPPLER: HCPCS

## 2024-04-18 PROCEDURE — 83735 ASSAY OF MAGNESIUM: CPT

## 2024-04-18 PROCEDURE — 85730 THROMBOPLASTIN TIME PARTIAL: CPT

## 2024-04-18 PROCEDURE — 6360000004 HC RX CONTRAST MEDICATION: Performed by: HOSPITALIST

## 2024-04-18 PROCEDURE — 36415 COLL VENOUS BLD VENIPUNCTURE: CPT

## 2024-04-18 PROCEDURE — 84100 ASSAY OF PHOSPHORUS: CPT

## 2024-04-18 PROCEDURE — 99233 SBSQ HOSP IP/OBS HIGH 50: CPT | Performed by: INTERNAL MEDICINE

## 2024-04-18 PROCEDURE — 83880 ASSAY OF NATRIURETIC PEPTIDE: CPT

## 2024-04-18 PROCEDURE — 80053 COMPREHEN METABOLIC PANEL: CPT

## 2024-04-18 PROCEDURE — 2580000003 HC RX 258: Performed by: INTERNAL MEDICINE

## 2024-04-18 RX ORDER — HYDROCODONE BITARTRATE AND ACETAMINOPHEN 5; 325 MG/1; MG/1
1 TABLET ORAL EVERY 4 HOURS PRN
Status: DISCONTINUED | OUTPATIENT
Start: 2024-04-18 | End: 2024-04-18 | Stop reason: HOSPADM

## 2024-04-18 RX ORDER — METOPROLOL SUCCINATE 25 MG/1
25 TABLET, EXTENDED RELEASE ORAL DAILY
Status: DISCONTINUED | OUTPATIENT
Start: 2024-04-18 | End: 2024-04-18 | Stop reason: HOSPADM

## 2024-04-18 RX ORDER — LISINOPRIL 20 MG/1
10 TABLET ORAL 2 TIMES DAILY
Qty: 30 TABLET | Refills: 3 | Status: SHIPPED | OUTPATIENT
Start: 2024-04-18

## 2024-04-18 RX ORDER — METOPROLOL SUCCINATE 25 MG/1
12.5 TABLET, EXTENDED RELEASE ORAL DAILY
Qty: 30 TABLET | Refills: 3 | Status: SHIPPED | OUTPATIENT
Start: 2024-04-19 | End: 2024-04-18

## 2024-04-18 RX ORDER — ASPIRIN 81 MG/1
81 TABLET, CHEWABLE ORAL DAILY
Qty: 60 TABLET | Refills: 0 | Status: SHIPPED | OUTPATIENT
Start: 2024-04-18

## 2024-04-18 RX ORDER — METOPROLOL SUCCINATE 25 MG/1
12.5 TABLET, EXTENDED RELEASE ORAL DAILY
Qty: 30 TABLET | Refills: 3 | Status: SHIPPED | OUTPATIENT
Start: 2024-04-20

## 2024-04-18 RX ORDER — NITROGLYCERIN 0.4 MG/1
0.4 TABLET SUBLINGUAL EVERY 5 MIN PRN
Qty: 25 TABLET | Refills: 3 | Status: SHIPPED | OUTPATIENT
Start: 2024-04-18

## 2024-04-18 RX ORDER — ATORVASTATIN CALCIUM 80 MG/1
80 TABLET, FILM COATED ORAL NIGHTLY
Qty: 30 TABLET | Refills: 3 | Status: SHIPPED | OUTPATIENT
Start: 2024-04-18

## 2024-04-18 RX ORDER — HYDROCODONE BITARTRATE AND ACETAMINOPHEN 5; 325 MG/1; MG/1
2 TABLET ORAL EVERY 4 HOURS PRN
Status: DISCONTINUED | OUTPATIENT
Start: 2024-04-18 | End: 2024-04-18 | Stop reason: HOSPADM

## 2024-04-18 RX ORDER — ATROPINE SULFATE 1 MG/ML
0.5 INJECTION, SOLUTION INTRAVENOUS
Status: DISCONTINUED | OUTPATIENT
Start: 2024-04-18 | End: 2024-04-18

## 2024-04-18 RX ORDER — FENTANYL CITRATE 50 UG/ML
25 INJECTION, SOLUTION INTRAMUSCULAR; INTRAVENOUS
Status: DISCONTINUED | OUTPATIENT
Start: 2024-04-18 | End: 2024-04-18

## 2024-04-18 RX ADMIN — ACETAMINOPHEN 1000 MG: 500 TABLET ORAL at 15:46

## 2024-04-18 RX ADMIN — Medication 5000 UNITS: at 08:42

## 2024-04-18 RX ADMIN — METOPROLOL TARTRATE 100 MG: 50 TABLET, FILM COATED ORAL at 06:30

## 2024-04-18 RX ADMIN — SODIUM CHLORIDE, PRESERVATIVE FREE 10 ML: 5 INJECTION INTRAVENOUS at 08:43

## 2024-04-18 RX ADMIN — LISINOPRIL 20 MG: 20 TABLET ORAL at 08:42

## 2024-04-18 RX ADMIN — ASPIRIN 81 MG: 81 TABLET, CHEWABLE ORAL at 08:42

## 2024-04-18 RX ADMIN — PERFLUTREN 1.5 ML: 6.52 INJECTION, SUSPENSION INTRAVENOUS at 08:07

## 2024-04-18 RX ADMIN — ACETAMINOPHEN 1000 MG: 500 TABLET ORAL at 06:31

## 2024-04-18 RX ADMIN — TICAGRELOR 90 MG: 90 TABLET ORAL at 08:42

## 2024-04-18 ASSESSMENT — ENCOUNTER SYMPTOMS
DIARRHEA: 0
VOMITING: 0
COUGH: 0
SHORTNESS OF BREATH: 1
CHEST TIGHTNESS: 0
NAUSEA: 1

## 2024-04-18 ASSESSMENT — PAIN SCALES - WONG BAKER
WONGBAKER_NUMERICALRESPONSE: NO HURT

## 2024-04-18 ASSESSMENT — PAIN SCALES - GENERAL
PAINLEVEL_OUTOF10: 0
PAINLEVEL_OUTOF10: 0

## 2024-04-18 NOTE — ED PROVIDER NOTES
[facility] EMERGENCY DEPT  eMERGENCY dEPARTMENT eNCOUnter      Pt Name: Harmeet Weems  MRN: [id number]  Birthdate 1950  Date of evaluation: 4/17/2024  Provider: Sathya Xie MD      BRIEF EMERGENCY DEPARTMENT ASSESSMENT      Patient has been [facility] Emergency Department for symptoms of chest pain. Case was accepted via auto accept STEMI protocol by  transfer center.       On arrival to the ED via EMS patient is sitting on stretcher, speaking and  answering answering questions appropriately.  He does not appear in any acute respiratory distress.  Patient's vital signs have remained stable.  Patient has already received Lovenox prior to arrival.     On my exam: Patient is alert and speaking. Patient is in no respiratory distress.          Review of patient's EKG demonstrates ST elevation identified in the inferior leads consistent with an acute STEMI.     Case was discussed with Dr. Penn, Cardiology on call.  Planning to proceed to cardiac catheterization lab.    Case was discussed with Dr. Marcelo who is agreeable for admission to the Hospitalist service.        FINAL IMPRESSION      1. Acute ST elevation myocardial infarction (STEMI) involving other coronary artery of inferior wall (HCC)          DISPOSITION/PLAN   DISPOSITION Decision To Admit      (Please note that portions of this note were completed with a voice recognition program.  Efforts were made to edit the dictations but occasionally words are mis-transcribed.)    Sathya Xie MD (electronically signed)  Attending Emergency Physician        Sathya Xie MD  04/17/24 2033

## 2024-04-18 NOTE — PLAN OF CARE
SUBJECTIVE:    CP since 2pm  Transferred in by [facility]  In Cath Lab  Dr Ann of Cardiology has asked for Hospitalist team to admit and comanage      OBJECTIVE:    There were no vitals taken for this visit.    No intake/output data recorded.  No intake/output data recorded.    No CBC or BMP or CMP or Coags etcetera available as of yet      ASSESSMENTS & PLANS:    STEMI:  Tele  Admit to ICU as inpatient status patient  Cardio consultant to please stay on case  Clears withOUT caffeine  Mag, Phos, TSH with reflex T4, Lipid Panel, HbA1c, CMP, CBC with Differential, Tn, ProBNP - sent as now as add ons  CBC and BMP with Reflex for following AM  K goal of WNL and >= 4.0  Mag goal of WNL and >= 2.0  ASA as per protocol (324-325mg chewed STAT unless on 81ASA daily in which case 162mg chewed STAT if not yet given, THEN from following AM 81mg Daily.)  Statin as per protocol (High intensity Statin, crestor 40mg PO QHS or Lipitor 80mg PO QHS or Zocor 80mg PO QHS if already on it)  NG SL PRN CP  TTE in AM  EKG already done, EKG PRN, and EKG in AM and EKG paired with any timed Troponin  Cardiac Provocative and Invasive Testing will be deferred to Cardiology - is in cath lab now    Chronic Medical Problems:  Continue current Regimen as indicated    Supportive and Prophylactic Txx:  DVT PPx: SCDs and PLANNING for lovenox 40mg SQ Qday from AM but will defer to Cardio they ALREADY had at OSH prior to transport here (dosing not known)  GI (PUD) PPx: not indicated  PT: to prevent deconditioning as per age and admitted status defer to cardio if they disagree      Case d/w ED Doc  Chart reviewed - minimal notes so far as per \"auto accept\" STEMI, notes from Dr Xie and Marie Begum were reviewed  Orders entered by NP & me with CPOE  Case d/w NP Hospitalist

## 2024-04-18 NOTE — DISCHARGE INSTRUCTIONS
Make Primary Care Appointment for 1 week after today. Take prescribed medications as ordered. Do not stop taking the Brillinta unless talking with cardiologist first. Even if another doctor tells you to do so, contact cardiologist beforehand.       Do not put right wrist in any standing water like a pool, hot tub, bath, or sink.     Do not lift anything heavier than a milk jug for 1 week.     Monitor signs and symptoms of infection such as oozing, foul smell, fever, increasing pain, red streaks from insertion site.     Leave dressing on until tomorrow morning. Then you may remove. If bleeding occurs, hold pressure and go to the ER.     If you have any questions please call cardiology office.

## 2024-04-18 NOTE — ED NOTES
1853 Notified of STEMI. Notified Dr. Cristina (cardiology) of STEMI. Sent Dr. Cristina a copy of the EKG.   1900 Dr. Cristina speaking with Dr. Xie. Called PBX to call cath lab in.  1923 Cath lab notified us they have arrived.

## 2024-04-18 NOTE — PROGRESS NOTES
04/18/24 1652   Encounter Summary   Encounter Overview/Reason  Initial Encounter   Service Provided For: Patient and family together  (with wife at bedside in ICU)   Referral/Consult From: Rounding   Support System Family members   Complexity of Encounter Low   Begin Time 1245   End Time  1315   Total Time Calculated 30 min   Spiritual/Emotional needs   Type Spiritual Support   Assessment/Intervention/Outcome   Assessment Hopeful   Intervention Active listening;Discussed belief system/Church practices/frances;Explored/Affirmed feelings, thoughts, concerns   Outcome Expressed feelings, needs, and concerns   Plan and Referrals   Plan/Referrals No future visits requested  (pt said he is about to go home)     Mr. Weems was in a good spirit and was encouraged by the good news that he is able to go home. His wife of 51 years was at bedside. They are excited to see their \"great grand baby.\"    Electronically signed by Chaplain Roya Urias on 4/18/2024 at 4:57 PM

## 2024-04-18 NOTE — PROGRESS NOTES
Patient requested he would like to go home this morning. Dr. Cristina states that patient should wait until 1500 and ambulate and assess how he feels after this. Patient did have a few instances of bradycardia in 30-40's x 3 times as well as an increase in PVC occurrence. Patient states he does not feel any different. Heart rate did increase back to 50's-60's immediately. Notified Dr. Cristina and Dr. Luna. Patient still wishes to go home.       Notified Dr. Cristina of patients request to discharge. Orders placed. [facility] [place].     Discussed discharge medications:     Brilinta 90 mg BID   Aspirin 81 mg daily   Atorvastatin 80 mg nightly   Toprol XL 12.5 mg daily starting Saturday (verbal order from Dr. Cristina)  Jardiance 12.5 mg daily   Lisinopril 10 mg BID (verbal order from Dr. Cristina)      Instructed where to reach out with questions about medications and using Dashbell to message Cardiology office.     Discussed follow up appointment with Keeley Akins.     Discussed post op radial cath care. Provided information regarding heart attack and what to expect. Questions answered at this time. Patient dressed in home clothing, wife took other belongings. Patient took phone .       Brought patient to private vehicle. Wife to drive patient home.     Electronically signed by Zarina Lorenzo RN on 4/18/2024 at 6:11 PM

## 2024-04-18 NOTE — PROGRESS NOTES
4 Eyes Skin Assessment     NAME:  Harmeet Weems  YOB: 1950  MEDICAL RECORD NUMBER:  [id number]    The patient is being assessed for  Cath Lab Post-Op    I agree that at least one RN has performed a thorough Head to Toe Skin Assessment on the patient. ALL assessment sites listed below have been assessed.      Areas assessed by both nurses:    Head, Face, Ears, Shoulders, Back, Chest, Arms, Elbows, Hands, Sacrum. Buttock, Coccyx, Ischium, Legs. Feet and Heels, and Under Medical Devices         Does the Patient have a Wound? No noted wound(s)       Gui Prevention initiated by RN: No  Wound Care Orders initiated by RN: No    Pressure Injury (Stage 3,4, Unstageable, DTI, NWPT, and Complex wounds) if present, place Wound referral order by RN under : No    New Ostomies, if present place, Ostomy referral order under : No     Nurse 1 eSignature: Electronically signed by Jacquie Lawson RN on 4/18/24 at 3:00 AM CDT    **SHARE this note so that the co-signing nurse can place an eSignature**    Nurse 2 eSignature: Electronically signed by Tobias Stokes RN on 4/18/24 at 3:01 AM CDT

## 2024-04-18 NOTE — H&P
[facility] Hospitalist Group History and Physical    Patient Information:  Patient: Harmeet Weems  MRN: [id number]   Acct: [id number]  YOB: 1950  Admit Date: 4/17/2024      Primary Care Physician: Charles Palmer MD  Advance Directive: Full Code  Health Care Proxy: Mrs. Jes Weems, his wife, [phone]         SUBJECTIVE:    EP Sign Out:  CP since 2pm  Transferred in by [facility]  In Cath Lab  Dr Ann of Cardiology has asked for Hospitalist team to admit and comanage    HPI:  Mr. Harmeet weems is a pleasant 73 year old gent. He had pain in his jaw start at 2-3 pm. He states that it started to get worse and then was in his chest. He went to [facility] and they told him he was having a heart attack and sent him to the [facility] for intervention. He was brought here by ambulance and taken for emergency cardiac catheterization.He had a proximal to RCA 99% stenosis which had a pair of overlapping stents placed, there was no residual flow defect.  He has no problems with any of his medications and has been taking them as directed. He has not used tobacco or nicotine of any form sine 1998.. He had smoked 1/2 PPD for 29 years prior to that. This was his second heart attack, the first was [id number]. He also has a Hx of HTN and HLD and CVA.  He states that his chest does not hurt, he does not feel pressure on the chest, he does not feel dyspnea, he states that he feels completely fine now that his cath is done.     Review of Systems:   Review of Systems   Constitutional:  Positive for diaphoresis (earlier today) and fatigue (\"I was\" earlier today). Negative for chills and fever.   HENT:  Negative for sneezing.    Respiratory:  Positive for shortness of breath (earlier today). Negative for cough and chest tightness.    Cardiovascular:  Positive for chest pain (earlier today). Negative for palpitations and leg swelling (just left leg but has been like

## 2024-04-18 NOTE — PROGRESS NOTES
Cardiology Daily Note Allison Cristina MD      Patient:  Harmeet Weems  [id number]    Patient Active Problem List    Diagnosis Date Noted    STEMI (ST elevation myocardial infarction) (Formerly [facility]) 04/17/2024    GUICHO (acute kidney injury) (Formerly [facility]) 02/22/2022    Infected kidney cyst, right 02/22/2022    UTI due to extended-spectrum beta lactamase (ESBL) producing Escherichia coli 08/30/2021    Postoperative pain     Long term current use of anticoagulant therapy 06/21/2018    CAD (coronary artery disease)     Essential hypertension     ACS (acute coronary syndrome) (Formerly [facility]) 04/27/2016    Family history of early CAD 04/27/2016       Admit Date:  4/17/2024    Admission Problem List: Present on Admission:   STEMI (ST elevation myocardial infarction) (Formerly [facility])      Cardiac Specific Data:  Specialty Problems          Cardiology Problems    ACS (acute coronary syndrome) (Formerly [facility])        Essential hypertension        CAD (coronary artery disease)        * (Principal) STEMI (ST elevation myocardial infarction) (Formerly [facility])           Subjective:  Mr. Weems status post PCI for STEMI inferior.  Patient denies any chest pain, dyspnea, palpitations.   Right radial site looks clean dry and intact        Objective:   /70   Pulse 59   Temp 97.5 °F (36.4 °C) (Temporal)   Resp 16   Ht 1.905 m (6' 3\")   Wt 130.6 kg (287 lb 14.7 oz)   SpO2 98%   BMI 35.99 kg/m²       Intake/Output Summary (Last 24 hours) at 4/18/2024 1152  Last data filed at 4/18/2024 0800  Gross per 24 hour   Intake 120 ml   Output 575 ml   Net -455 ml       Prior to Admission medications    Medication Sig Start Date End Date Taking? Authorizing Provider   Cholecalciferol (VITAMIN D3) 125 MCG (5000 UT) TABS Take 1 tablet by mouth daily    Provider, MD Olesya   aspirin (ASPIRIN CHILDRENS) 81 MG chewable tablet Take 1 tablet by mouth in the morning and at bedtime 2/20/24   Umesh Metzger MD   ibuprofen (ADVIL;MOTRIN) 400 MG tablet Take 1 tablet by mouth every 8 hours as needed

## 2024-04-18 NOTE — PLAN OF CARE
Problem: Discharge Planning  Goal: Discharge to home or other facility with appropriate resources  Outcome: Progressing     Problem: Safety - Adult  Goal: Free from fall injury  Outcome: Progressing  Flowsheets (Taken 4/18/2024 0000 by Jacquie Lawson RN)  Free From Fall Injury: Instruct family/caregiver on patient safety     Problem: Chronic Conditions and Co-morbidities  Goal: Patient's chronic conditions and co-morbidity symptoms are monitored and maintained or improved  Outcome: Progressing     Problem: ABCDS Injury Assessment  Goal: Absence of physical injury  Outcome: Progressing     Problem: Neurosensory - Adult  Goal: Achieves stable or improved neurological status  Outcome: Progressing     Problem: Respiratory - Adult  Goal: Achieves optimal ventilation and oxygenation  Outcome: Progressing     Problem: Cardiovascular - Adult  Goal: Maintains optimal cardiac output and hemodynamic stability  Outcome: Progressing     Problem: Skin/Tissue Integrity - Adult  Goal: Skin integrity remains intact  Outcome: Progressing     Problem: Musculoskeletal - Adult  Goal: Return mobility to safest level of function  Outcome: Progressing     Problem: Gastrointestinal - Adult  Goal: Minimal or absence of nausea and vomiting  Outcome: Progressing     Problem: Genitourinary - Adult  Goal: Absence of urinary retention  Outcome: Progressing     Problem: Infection - Adult  Goal: Absence of infection at discharge  Outcome: Progressing     Problem: Metabolic/Fluid and Electrolytes - Adult  Goal: Electrolytes maintained within normal limits  Outcome: Progressing     Problem: Hematologic - Adult  Goal: Maintains hematologic stability  Outcome: Progressing

## 2024-04-18 NOTE — PROGRESS NOTES
2022    LEFT RENAL EXTRACORPOREAL SHOCK WAVE LITHOTRIPSY performed by Frederick Valdez MD at [facility] OR    NJ CIRCUMCISION AGE >28 DAYS N/A 2018    DORSAL SLIT performed by Frederick Valdez MD at [facility] OR    [place] CYSTO/URETERO W/LITHOTRIPSY &INDWELL STENT INSRT Right 2018    CYSTOSCOPY URETEROSCOPY LASER LITHOTRIPSY performed by Frederick Valdez MD at [facility] OR    [place] LITHOTRIPSY XTRCORP SHOCK WAVE Right 2018    ESWL EXTRACORPEAL SHOCK WAVE LITHOTRIPSY performed by Frederick Valdez MD at [facility] OR    TOTAL KNEE ARTHROPLASTY Left 2024    ROBOTIC LEFT TOTAL KNEE ARTHROPLASTY performed by Umesh Metzger MD at [facility] OR       Family History   Problem Relation Age of Onset    No Known Problems Mother          after a MVA in older age    Heart Disease Father     Cancer Sister          at age 52 from breast cancer    Lung Cancer Sister         dued at age 64, unknown smoking status    No Known Problems Sister         former smoker    Cancer Brother          atage 62 from throat cacer, was a formner smoker    No Known Problems Brother         unknown causes    No Known Problems Son     No Known Problems Daughter        Social History     Socioeconomic History    Marital status:      Spouse name: Mrs. Jes Weems    Number of children: 2    Years of education: Not on file    Highest education level: Not on file   Occupational History    Occupation: [employer]     Comment: retired after 45 years   Tobacco Use    Smoking status: Former     Current packs/day: 0.00     Average packs/day: 0.5 packs/day for 29.0 years (14.5 ttl pk-yrs)     Types: Cigarettes     Start date:      Quit date:      Years since quittin.3    Smokeless tobacco: Never   Vaping Use    Vaping Use: Never used   Substance and Sexual Activity    Alcohol use: Not Currently     Comment: \"not in years\", was never a heavy user    Drug use: Never    Sexual activity: Yes     Partners: Female     Comment:  has a son and a daughter   Other Topics Concern    Not on file   Social History Narrative    CODE STATUS: Full Code    HEALTH CARE PROXY: Mrs. Jes Weems, his wife, [phone]    AMBULATES: independently, is in recovery from left knee replacement    DOMICILED: has 2 steps to get up in to the home, has 2 steps down in to the den but no stair cases, lives with his wife and their dog     Social Determinants of Health     Financial Resource Strain: Not on file   Food Insecurity: Not on file   Transportation Needs: Not on file   Physical Activity: Not on file   Stress: Not on file   Social Connections: Not on file   Intimate Partner Violence: Not on file   Housing Stability: Not on file       Current Facility-Administered Medications   Medication Dose Route Frequency Provider Last Rate Last Admin    vitamin D (CHOLECALCIFEROL) capsule 5,000 Units  5,000 Units Oral Daily Javid Marcelo MD   5,000 Units at 04/18/24 0842    acetaminophen (TYLENOL) tablet 1,000 mg  1,000 mg Oral 3 times per day Javid Marcelo MD   1,000 mg at 04/18/24 0631    ibuprofen (ADVIL;MOTRIN) tablet 400 mg  400 mg Oral Q8H PRN Javid Marcelo MD        lisinopril (PRINIVIL;ZESTRIL) tablet 20 mg  20 mg Oral BID Javid Marcelo MD   20 mg at 04/18/24 0842    metoprolol tartrate (LOPRESSOR) tablet 100 mg  100 mg Oral Daily Javid Marcelo MD   100 mg at 04/18/24 0630    tiZANidine (ZANAFLEX) tablet 4 mg  4 mg Oral Q8H PRN Javid Marcelo MD   4 mg at 04/17/24 2246    potassium chloride (KLOR-CON M) extended release tablet 40 mEq  40 mEq Oral PRN Javid Marcelo MD        Or    potassium bicarb-citric acid (EFFER-K) effervescent tablet 40 mEq  40 mEq Oral PRN Javid Marcelo MD        Or    potassium chloride 10 mEq/100 mL IVPB (Peripheral Line)  10 mEq IntraVENous PRN Javid Marcelo MD        magnesium sulfate 2000 mg in 50 mL IVPB premix  2,000 mg IntraVENous PRN Javid Marcelo MD        ondansetron (ZOFRAN-ODT) disintegrating tablet 4

## 2024-04-18 NOTE — H&P
[facility]  Cardiology Consult      Requesting MD:  Javid Marcelo MD   Admit Status:         History obtained from:   [x] Patient  [] Other (specify):     Reason for consultation : Inferior STEMI      PRESENTATION: Harmeet Weems is a 73 y.o. year old male who presents with inferior STEMI.   Patient mentions that he has been having chest pain since 2 PM and he was transferred from [facility] after his chest pain persisted and a repeat EKG showed inferior ST elevation MI, patient mentions that this chest pain is 8 out of 10 radiating to his jaw , associated with diaphoresis and dyspnea.  Prior history of stent in the diagonal.  Patient is compliant with all his medications.  Additional history includes hypertension, hyperlipidemia and osteoarthritis of knees.       REVIEW OF SYSTEMS:  As per HPI, all other 12 systems reviewed and negative     Past Medical History:      Diagnosis Date    ACS (acute coronary syndrome) (Formerly [facility]) 04/27/2016 4/27/16  ACS  BRANDO RISK Score 1 (angina), AUC indication 3, AUC score 7 4/27/16  Cath  99% 1st diagonal, 2.25 x 15 YURIDIA, anterior lateral hypokinesis, EF 45%     Arthritis     CAD (coronary artery disease)     [facility]    Cerebral artery occlusion with cerebral infarction ([facility]) 1998    Difficulty voiding     per pt c/o.    Hyperlipidemia     Hypertension 04/27/2016    Knee pain     MI (myocardial infarction) ([facility]) 2016    stent    Psoriasis     Renal calculus, right 08/01/2018    UTI (urinary tract infection)     due to stones; pt has been taking iv antibiotics at home; 1/5/22 last dose       Past Surgical History:      Procedure Laterality Date    BLADDER SURGERY Right 03/22/2022    CYSTOSCOPY RIGHT URETERAL STENT REMOVAL performed by Frederick Valdez MD at [facility] OR    CARDIAC CATHETERIZATION  2016    Stent x 1    COLONOSCOPY      CYSTOSCOPY Right 02/22/2022    CYSTOSCOPY ,URETEROSCOPY WITH URETERAL STENT INSERTION performed by  no organomegaly  non-distended with normal bowel sounds.  Musculoskeletal: No clubbing,   cyanosis or edema bilaterally.    Skin: No rashes or lesions.  Neurologic:  Neurovascularly intact without   any focal sensory/motor deficits.   Psychiatric: Alert and oriented,   thought content appropriate, normal insight  Peripheral Pulses: +1 palpable, equal bilaterally           Labs:  No results for input(s): \"WBC\", \"HGB\", \"PLT\" in the last 72 hours.    No results for input(s): \"NA\", \"K\", \"CL\", \"CO2\", \"BUN\", \"CREATININE\", \"LABGLOM\", \"MG\", \"CALCIUM\", \"PHOS\" in the last 72 hours.        IMAGING:  No results found.      EKG: Inferior ST elevation MI, normal sinus rhythm with reciprocal changes in lead I and aVL      Assessment   Inferior STEMI  Hypertension  Prior history of CAD with a stent in the diagonal  Hyperlipidemia           Plan:  Patient was taken to the Cath Lab emergently as soon as he was transferred from other hospital, status post PCI of the infarct-related ostial to proximal RCA with a 3.5 x 8 mm overlapped with a 3.5 x 26mm [place] resolute drug-eluting stent.  BRANDO I flow was restored to BRANDO III flow.  99% stenosis reduced to no residual stenosis.   Initiate medications with aspirin 81 mg once a day, Brilinta 90 mg twice a day,  Lipitor 80 mg once a day, Toprol-XL 12.5 mg once a day, lisinopril 10 mg once a day  If the blood pressure and heart rate tolerates.   LVEF appears to be well-preserved at 60% on LV gram  Repeat EKG postcardiac cath showed significant risk resolution of the ST elevation   Q waves inferiorly, which is unchanged from his initial EKG        Electronically signed by Allison Cristina MD on 4/17/2024 at 9:25 PM

## 2024-04-18 NOTE — PROGRESS NOTES
Harmeet Weems arrived to room # 150.   Presented with: STEMI  Mental Status: Patient is alert, coherent, logical, thought processes intact, and able to concentrate and follow conversation.   Vitals:    04/17/24 2330   BP: (!) 154/87   Pulse: 78   Resp: 16   Temp:    SpO2: 93%     Patient safety contract and falls prevention contract reviewed with patient Yes.  Oriented Patient to room.  Call light within reach. Yes.  Needs, issues or concerns expressed at this time: no.      Electronically signed by Jacquie Lawson RN on 4/17/2024 at 11:59 PM

## 2024-04-18 NOTE — BRIEF OP NOTE
Brief Postoperative Note      Patient: Harmeet Weems  YOB: 1950  MRN: [id number]    Date of Procedure: 4/17/2024    Post-Op Diagnosis:   Successful PCI of the infarct-related ostial to proximal   RCA with a 3.5 x 8 mm overlapped with a 3.5 x 26mm   [place] resolute drug-eluting stent.    BRANDO I flow was restored to BRANDO III flow.    99% stenosis reduced to no residual stenosis.     Estimated Blood Loss (mL): Minimal, less than 10 mL    Complications: None    Moderate sedation with 2 mg of Versed and 50 of fentanyl    Findings:  Patent stent in the diagonal  LAD midportion has mild 30% disease  Circumflex has diffuse disease of 30%, gives rise to 2 obtuse marginals with mild 30% disease  Right dominant  Preserved LVEF of 60%  Successful PCI of the infarct-related ostial to proximal   RCA with a 3.5 x 8 mm overlapped with a 3.5 x 26mm   [place] resolute drug-eluting stent.  IVUS was performed showing well apposed stent.           Electronically signed by Allison Cristina MD on 4/17/2024 at 9:31 PM

## 2024-04-19 NOTE — DISCHARGE INSTR - COC
Continuity of Care Form    Patient Name: Harmeet Weems   :  1950  MRN:  [id number]    Admit date:  2024  Discharge date: 2024    Code Status Order: Prior   Advance Directives:     Admitting Physician:  Allison Cristina MD  PCP: Charles Palmer MD    Discharging Nurse:   Discharging Hospital Unit/Room#: 0150/150-01  Discharging Unit Phone Number:     Emergency Contact:   Extended Emergency Contact Information  Primary Emergency Contact: Jes Weems  Address: [address] [place] [address]  Home Phone: [phone]  Mobile Phone: [phone]  Relation: Spouse  Secondary Emergency Contact: Michelle Reddy   [facility]  Home Phone: [phone]  Relation: Child    Past Surgical History:  Past Surgical History:   Procedure Laterality Date    BLADDER SURGERY Right 2022    CYSTOSCOPY RIGHT URETERAL STENT REMOVAL performed by Frederick Valdez MD at [facility] OR    CARDIAC CATHETERIZATION N/A     Stent x 1    COLONOSCOPY N/A     \"showed fine\" states he was told to come back in 7 years    CYSTOSCOPY Right 2022    CYSTOSCOPY ,URETEROSCOPY WITH URETERAL STENT INSERTION performed by Frederick Valdez MD at [facility] OR    CYSTOSCOPY Right 2022    RIGHT URETEROSCOPY LASER LITHOTRIPSY STONE BASKET EXTRACTION performed by Frederick Valdez MD at [facility] OR    CYSTOSCOPY INSERTION / REMOVAL STENT / STONE Right 2018    CYSTOSCOPY STENT REMOVAL performed by Fredreick Valdez MD at [facility] OR    LITHOTRIPSY Left 2022    LEFT RENAL EXTRACORPOREAL SHOCK WAVE LITHOTRIPSY performed by Frederick Valdez MD at [facility] OR    CO CIRCUMCISION AGE >28 DAYS N/A 2018    DORSAL SLIT performed by Frederick Valdez MD at [facility] OR    CO CYSTO/URETERO W/LITHOTRIPSY &INDWELL STENT INSRT Right 2018    CYSTOSCOPY URETEROSCOPY LASER LITHOTRIPSY performed by Frederick Valdez MD at [facility] OR    CO LITHOTRIPSY XTRCORP SHOCK WAVE Right 2018    ESWL EXTRACORPEAL

## 2024-04-19 NOTE — DISCHARGE SUMMARY
Discharge Summary    Date:4/18/2024        Patient Name:Harmeet Weems     YOB: 1950     Age:73 y.o.    Admit Date:4/17/2024   Admission Condition:good   Discharged Condition:good  Discharge Date: 04/18/24     Discharge Diagnoses   Principal Problem:    STEMI (ST elevation myocardial infarction) (Formerly [facility])  Resolved Problems:    * No resolved hospital problems. *      Hospital Stay   Narrative of Hospital Course: Patient was transferred from another facility with inferior STEMI underwent cardiac catheterization postprocedure hospital course was uneventful with no electrical hemodynamic or further clinical symptoms.  Patient ambulated well in the hallway without any cardiac symptoms.     Consultants:   IP CONSULT TO CARDIOLOGY  IP CONSULT TO CARDIAC REHAB  IP CONSULT TO CARDIAC REHAB    Time Spent on Discharge:  35 minutes were spent in patient examination, evaluation, counseling as well as medication reconciliation, prescriptions for required medications, discharge plan and follow up.      Surgeries/Procedures Performed:       Treatments:   cardiac meds: lisinopril (Zestril)    Significant Diagnostic Studies:   Recent Labs:  CBC:   Lab Results   Component Value Date/Time    WBC 7.4 04/18/2024 01:19 AM    RBC 4.59 04/18/2024 01:19 AM    HGB 12.9 04/18/2024 01:19 AM    HCT 38.8 04/18/2024 01:19 AM    MCV 84.5 04/18/2024 01:19 AM    MCH 28.1 04/18/2024 01:19 AM    MCHC 33.2 04/18/2024 01:19 AM    RDW 12.8 04/18/2024 01:19 AM     04/18/2024 01:19 AM       Radiology Last 7 Days:  No results found.    Discharge Plan   Disposition: Home    Provider Follow-Up:   Keeley Akins, APRN  [address], [address]  [facility] [address]  [phone]    Follow up on 5/16/2024  1:00pm Cardiology follow-up appointment       Hospital/Incidental Findings Requiring Follow-Up:  Status post stent placement in the right coronary artery     Patient Instructions   Diet: cardiac diet    Activity: activity as  tablet  atorvastatin 80 MG tablet  empagliflozin 25 MG tablet  lisinopril 20 MG tablet  metoprolol succinate 25 MG extended release tablet  nitroGLYCERIN 0.4 MG SL tablet  ticagrelor 90 MG Tabs tablet         Electronically signed by Allison Cristina MD on 4/18/24 at 8:58 PM CDT

## 2024-04-19 NOTE — DISCHARGE INSTR - DIET

## 2024-04-19 NOTE — CONSULTS
Cardiac Rehab MI/PTCA/Stent education packet was sent to the patient's address on record.  Handouts titled; \"Home Instructions Following a Cardiac Event\", \"A Healthy Heart: Care Instructions\",  \"Cardiac Diet/Low Cholesterol\", \"Cardiac Rehabilitation: An Individualized Supervised Program For You\" and a [facility] Cardiac & Pulmonary Rehabilitation brochure were included.  Patient was instructed to contact [facility] to enroll in Phase II Outpatient Cardiac Rehab.

## 2024-04-21 LAB
EKG P AXIS: 33 DEGREES
EKG P AXIS: 61 DEGREES
EKG P AXIS: 68 DEGREES
EKG P-R INTERVAL: 166 MS
EKG P-R INTERVAL: 180 MS
EKG P-R INTERVAL: 190 MS
EKG Q-T INTERVAL: 416 MS
EKG Q-T INTERVAL: 490 MS
EKG Q-T INTERVAL: 520 MS
EKG QRS DURATION: 102 MS
EKG QRS DURATION: 104 MS
EKG QRS DURATION: 104 MS
EKG QTC CALCULATION (BAZETT): 438 MS
EKG QTC CALCULATION (BAZETT): 482 MS
EKG QTC CALCULATION (BAZETT): 512 MS
EKG T AXIS: -31 DEGREES
EKG T AXIS: -48 DEGREES
EKG T AXIS: 81 DEGREES

## 2024-04-22 LAB
EKG P AXIS: 73 DEGREES
EKG P-R INTERVAL: 200 MS
EKG Q-T INTERVAL: 522 MS
EKG QRS DURATION: 102 MS
EKG QTC CALCULATION (BAZETT): 512 MS
EKG T AXIS: -48 DEGREES

## 2024-05-09 ENCOUNTER — TELEPHONE (OUTPATIENT)
Dept: CARDIOLOGY CLINIC | Age: 74
End: 2024-05-09

## 2024-05-09 NOTE — TELEPHONE ENCOUNTER
A certified letter was sent to reschedule the appt due to the provider being on FMLA.  Unable to reach the patient by phone x3

## 2024-05-09 NOTE — TELEPHONE ENCOUNTER
Called the patient to reschedule the upcoming appt due to the provider being out of the office for FMLA   x3   Letter sent 5-9-24

## 2024-09-26 ENCOUNTER — OFFICE VISIT (OUTPATIENT)
Dept: CARDIOLOGY CLINIC | Age: 74
End: 2024-09-26
Payer: MEDICARE

## 2024-09-26 VITALS
SYSTOLIC BLOOD PRESSURE: 124 MMHG | HEART RATE: 75 BPM | BODY MASS INDEX: 34.69 KG/M2 | WEIGHT: 279 LBS | OXYGEN SATURATION: 96 % | HEIGHT: 75 IN | DIASTOLIC BLOOD PRESSURE: 78 MMHG

## 2024-09-26 DIAGNOSIS — I10 ESSENTIAL HYPERTENSION: ICD-10-CM

## 2024-09-26 DIAGNOSIS — I25.10 CORONARY ARTERY DISEASE INVOLVING NATIVE CORONARY ARTERY OF NATIVE HEART WITHOUT ANGINA PECTORIS: Primary | ICD-10-CM

## 2024-09-26 DIAGNOSIS — E78.5 HYPERLIPIDEMIA, UNSPECIFIED HYPERLIPIDEMIA TYPE: ICD-10-CM

## 2024-09-26 PROCEDURE — 1123F ACP DISCUSS/DSCN MKR DOCD: CPT | Performed by: CLINICAL NURSE SPECIALIST

## 2024-09-26 PROCEDURE — 99204 OFFICE O/P NEW MOD 45 MIN: CPT | Performed by: CLINICAL NURSE SPECIALIST

## 2024-09-26 PROCEDURE — 3078F DIAST BP <80 MM HG: CPT | Performed by: CLINICAL NURSE SPECIALIST

## 2024-09-26 PROCEDURE — 3074F SYST BP LT 130 MM HG: CPT | Performed by: CLINICAL NURSE SPECIALIST

## 2024-09-26 RX ORDER — LISINOPRIL 20 MG/1
10 TABLET ORAL 2 TIMES DAILY
Qty: 90 TABLET | Refills: 3 | Status: SHIPPED | OUTPATIENT
Start: 2024-09-26

## 2024-09-26 RX ORDER — NITROGLYCERIN 0.4 MG/1
0.4 TABLET SUBLINGUAL EVERY 5 MIN PRN
Qty: 25 TABLET | Refills: 3 | Status: SHIPPED | OUTPATIENT
Start: 2024-09-26

## 2024-09-26 RX ORDER — ATORVASTATIN CALCIUM 80 MG/1
80 TABLET, FILM COATED ORAL NIGHTLY
Qty: 90 TABLET | Refills: 3 | Status: SHIPPED | OUTPATIENT
Start: 2024-09-26

## 2024-09-26 RX ORDER — METOPROLOL SUCCINATE 25 MG/1
12.5 TABLET, EXTENDED RELEASE ORAL DAILY
Qty: 90 TABLET | Refills: 3 | Status: SHIPPED | OUTPATIENT
Start: 2024-09-26

## 2024-12-09 NOTE — PROGRESS NOTES
[facility] Neurosurgery  Office Visit      Chief Complaint   Patient presents with    New Patient     To establish care. Back Pain     Patient presents for back pain. He states that's he is having LBP that is worsening, he went to physical therapy and completed about three sessions and stopped due to increased pain. When he is sitting the pain stops in his back but radiates to his BLE. It hurts worse when he gets up to walk or stand. He notes BLE tingling and pain LLE worse than the RLE. Pain scale today is a 5/10. Results     Imaging in PACS. HISTORY OF PRESENT ILLNESS:    Sonia Salinas is a 72 y.o. male with a hx of CVA w/out deficit (1998), CAD, MI with stenting who presents with low back pain and LLE pain. Back pain is chronic. He endorses LEFT thigh and distal leg pain, however, the pain does not really radiate down the leg from the lumbar spine. LLE pain started about 4-5 months ago. The patient denies numbness. When ambulating or standing he will have low back pain. When asked if he could walk through [facility] [employer] and he said he cannot due to LLE and low back pain. Has no back pain when sitting, however, LLE pain is rather persistent. His pain is worsened when going from a seated to standing position. His pain is worsened with walking. His pain is improved when lying flat. Overall, indicative that the patient does have a mechanical nature to their pain. Denies having any recent vascular studies. The patient has underwent a non-operative treatment course that has included:  NSAIDs (diclofenac) - no longer helping  Tylenol  Physical therapy - made his back pain worse        Of note he does not use tobacco and does not take blood thinning medications.                Past Medical History:   Diagnosis Date    ACS (acute coronary syndrome) ([address]) 4/27/2016 4/27/16  ACS  BRANDO RISK Score 1 (angina), AUC indication 3, AUC score 7 4/27/16  Cath  99% 1st diagonal, 2.25 x 15 YURIDIA, anterior
Review of Systems   Constitutional: Negative. HENT: Negative. Eyes: Negative. Respiratory: Negative. Cardiovascular: Negative. Gastrointestinal: Negative. Genitourinary: Negative. Musculoskeletal:  Positive for back pain and myalgias. Skin: Negative. Neurological:  Positive for tingling and weakness. Endo/Heme/Allergies: Negative. Psychiatric/Behavioral: Negative.
Yes...

## 2025-03-26 ENCOUNTER — OFFICE VISIT (OUTPATIENT)
Dept: CARDIOLOGY CLINIC | Age: 75
End: 2025-03-26
Payer: MEDICARE

## 2025-03-26 VITALS
HEART RATE: 76 BPM | DIASTOLIC BLOOD PRESSURE: 66 MMHG | BODY MASS INDEX: 36.06 KG/M2 | SYSTOLIC BLOOD PRESSURE: 100 MMHG | WEIGHT: 290 LBS | OXYGEN SATURATION: 94 % | HEIGHT: 75 IN

## 2025-03-26 DIAGNOSIS — I25.10 CORONARY ARTERY DISEASE INVOLVING NATIVE CORONARY ARTERY OF NATIVE HEART WITHOUT ANGINA PECTORIS: ICD-10-CM

## 2025-03-26 DIAGNOSIS — E78.5 HYPERLIPIDEMIA, UNSPECIFIED HYPERLIPIDEMIA TYPE: ICD-10-CM

## 2025-03-26 DIAGNOSIS — I10 ESSENTIAL HYPERTENSION: Primary | ICD-10-CM

## 2025-03-26 PROCEDURE — 3078F DIAST BP <80 MM HG: CPT | Performed by: CLINICAL NURSE SPECIALIST

## 2025-03-26 PROCEDURE — 1159F MED LIST DOCD IN RCRD: CPT | Performed by: CLINICAL NURSE SPECIALIST

## 2025-03-26 PROCEDURE — 1160F RVW MEDS BY RX/DR IN RCRD: CPT | Performed by: CLINICAL NURSE SPECIALIST

## 2025-03-26 PROCEDURE — 3074F SYST BP LT 130 MM HG: CPT | Performed by: CLINICAL NURSE SPECIALIST

## 2025-03-26 PROCEDURE — 1123F ACP DISCUSS/DSCN MKR DOCD: CPT | Performed by: CLINICAL NURSE SPECIALIST

## 2025-03-26 PROCEDURE — 99214 OFFICE O/P EST MOD 30 MIN: CPT | Performed by: CLINICAL NURSE SPECIALIST

## 2025-03-26 PROCEDURE — 93000 ELECTROCARDIOGRAM COMPLETE: CPT | Performed by: CLINICAL NURSE SPECIALIST

## 2025-03-26 RX ORDER — ATORVASTATIN CALCIUM 80 MG/1
80 TABLET, FILM COATED ORAL NIGHTLY
Qty: 90 TABLET | Refills: 3 | OUTPATIENT
Start: 2025-03-26

## 2025-03-26 RX ORDER — LISINOPRIL 20 MG/1
10 TABLET ORAL 2 TIMES DAILY
Qty: 90 TABLET | Refills: 3 | OUTPATIENT
Start: 2025-03-26

## 2025-03-26 NOTE — PROGRESS NOTES
[facility]  [address] [address]  Phone: [phone]  Fax: [phone]    OFFICE VISIT:  3/26/2025    Harmeet Weems - : 1950    Reason For Visit:  Harmeet is a 74 y.o. male who is here for 6 Month Follow-Up, Coronary Artery Disease, and Hypertension  History of coronary disease, hypertension, hyperlipidemia     Patient presented to the hospital being transferred from outside facility on 2024 with STEMI.  Underwent heart catheterization with successful drug-eluting stenting of the ostial and proximal RCA.    He was seen in follow-up in September with some stable JOSE and activity intolerance due to back pain and knee pain   Does use a peddle exerciser    He returns today for routine follow-up.  Denies any cardiac complaints.  Has noticed some muscle spasms in his in his sides when he turns a certain way    Subjective  Harmeet denies exertional chest pain, shortness of breath, orthopnea, paroxysmal nocturnal dyspnea, syncope, presyncope, arrhythmia, edema and fatigue.  The patient denies numbness or weakness to suggest cerebrovascular accident or transient ischemic attack.    Hayder Li PA is PCP and follows labs. Saw PA this week and started on Zoloft    Harmeet Weems has the following history as recorded in [facility]:    Patient Active Problem List    Diagnosis Date Noted    STEMI (ST elevation myocardial infarction) ([facility]) 2024    GUICHO (acute kidney injury) 2022    Infected kidney cyst, right 2022    UTI due to extended-spectrum beta lactamase (ESBL) producing Escherichia coli 2021    Postoperative pain     Long term current use of anticoagulant therapy 2018    CAD (coronary artery disease)     Essential hypertension     ACS (acute coronary syndrome) ([facility]) 2016    Family history of early CAD 2016     Past Medical History:   Diagnosis Date    ACS (acute coronary syndrome) ([facility]) 2016  ACS  BRANDO RISK Score 1

## 2025-03-26 NOTE — PATIENT INSTRUCTIONS
After you are finished with the Brilinta you can stop  Stay on the aspirin    Maintain good blood pressure control-goal<130/80 at rest  Maintain good cholesterol control LDL goal<70 with arterial disease  If you are diabetic work to keep/obtain hemoglobin A1c< 7    Follow up in 6 mos    Call with any questions or concerns  Follow up with Charles Palmer MD for non cardiac problems  Report any new problems  Cardiovascular Fitness-Exercise as tolerated.  Strive for 30 minutes of exercise most days of the week.    Cardiac / Healthy Diet- Avoid processed high fat foods, maintain low sodium/salt   Continue current medications as directed  Continue plan of treatment  It is always recommended that you bring your medications bottles with you to each visit - this is for your safety!

## (undated) DEVICE — SUTURE CHROMIC GUT SZ 2-0 L36IN ABSRB BRN SH L26MM 1/2 CIR G173H

## (undated) DEVICE — BANDAGE,GAUZE,CONFORMING,2"X75",STRL,LF: Brand: MEDLINE INDUSTRIES, INC.

## (undated) DEVICE — CONTAINER,SPECIMEN,OR STERILE,4OZ: Brand: MEDLINE

## (undated) DEVICE — CATHETER FOL 18 FR 5 CC 2 W HYDRGEL COAT DOVER

## (undated) DEVICE — SUTURE VCRL SZ 2-0 L36IN ABSRB UD L36MM CT-1 1/2 CIR J945H

## (undated) DEVICE — LARYNGOSCOPE VID MILLER 2 MTL BLADE M HNDL CURAPLEX

## (undated) DEVICE — SPONGE LAP W18XL18IN WHT COT 4 PLY FLD STRUNG RADPQ DISP ST 2 PER PACK

## (undated) DEVICE — TUBE ET 7.5MM NSL ORAL BASIC CUF INTMED MURPHY EYE RADPQ

## (undated) DEVICE — CONTRAST IOTHALAMATE MEGLUMINE 60% 50 ML INJ CONRAY 60

## (undated) DEVICE — LARYNGOSCOPE BLDE MAC HNDL M SZ 35 ST CURAPLEX CURAVIEW LED

## (undated) DEVICE — SUTURE CHROMIC GUT SZ 3-0 L36IN ABSRB BRN L26MM SH 1/2 CIR G172H

## (undated) DEVICE — GUIDEWIRE ENDOSCP L150CM DIA0.035IN TIP 3CM PTFE NIT

## (undated) DEVICE — Device

## (undated) DEVICE — CATHETER URET 5FR L70CM OPN END SGL LUMN INJ HUB FLEXIMA

## (undated) DEVICE — BANDAGE COMPR W4INXL5YD TAN BRTH SELF ADH WRP W/ HND TEAR

## (undated) DEVICE — STERILE LATEX POWDER FREE SURGICAL GLOVES WITH HYDROGEL COATING: Brand: PROTEXIS

## (undated) DEVICE — SUTURE VCRL SZ 3-0 L27IN ABSRB UD L19MM PS-2 3/8 CIR PRIM J427H

## (undated) DEVICE — SOLUTION IV IRRIG POUR BRL 0.9% SODIUM CHL 2F7124

## (undated) DEVICE — NEPTUNE E-SEP SMOKE EVACUATION PENCIL, COATED, 70MM BLADE, ROCKER SWITCH: Brand: NEPTUNE E-SEP

## (undated) DEVICE — SHIELD SURG COAX MULT TIP ORIFICE INTERPULSE

## (undated) DEVICE — Z INACTIVE USE 2660664 SOLUTION IRRIG 3000ML 0.9% SOD CHL USP UROMATIC PLAS CONT

## (undated) DEVICE — PAD,EYE,1-5/8X2 5/8,STERILE,LF,1/PK: Brand: MEDLINE

## (undated) DEVICE — NITINOL STONE RETRIEVAL DEVICE: Brand: DAKOTA

## (undated) DEVICE — FLEXOR, URETERAL ACCESS SHEATH WITH AQ, HYDROPHILIC COATING: Brand: FLEXOR

## (undated) DEVICE — ZIMMER® STERILE DISPOSABLE TOURNIQUET CUFF WITH PLC, DUAL PORT, SINGLE BLADDER, 34 IN. (86 CM)

## (undated) DEVICE — GLOVE SURG SZ 75 CRM LTX FREE POLYISOPRENE POLYMER BEAD ANTI

## (undated) DEVICE — DRESSING FOAM W4XL12IN SIL RECT ADH WTRPRF FLM BK W/ BORD

## (undated) DEVICE — BAG DRNGE COMB PK

## (undated) DEVICE — ROSA RBTC UNT 20 DROP

## (undated) DEVICE — PIN FIX STERILE L150MM DIA3.2MM FLUT

## (undated) DEVICE — INSTRUMENT KIT ORTHOPEDIC KNEE NAVITRACK

## (undated) DEVICE — SEAL ENDO INSTR SELF SEAL UROLOGY

## (undated) DEVICE — SUTURE ABSRB BRAID COAT UD CP NO 2 27IN VCRL J195H

## (undated) DEVICE — DUAL CUT SAGITTAL BLADE

## (undated) DEVICE — SOLUTION IRRIG 3000ML 0.9% SOD CHL USP UROMATIC PLAS CONT

## (undated) DEVICE — GLOVE SURG SZ 85 CRM LTX FREE POLYISOPRENE POLYMER BEAD ANTI

## (undated) DEVICE — Z DISCONTINUED BY MEDLINE USE 2733855 TRAY SKIN SCRB VAG PVP-I

## (undated) DEVICE — ADHESIVE SKIN CLOSURE WND 8.661X1.5 IN 22 CM LIQUIBAND SECUR

## (undated) DEVICE — BANDAGE COMPR W3INXL15FT BGE E SGL LAYERED CLP CLSR

## (undated) DEVICE — DRAINBAG,ANTI-REFLUX TOWER,L/F,2000ML,LL: Brand: MEDLINE

## (undated) DEVICE — INFLATION DEVICE: Brand: ENCORE™ 26

## (undated) DEVICE — SURGICAL PROCEDURE PACK CYTOSCOPY

## (undated) DEVICE — GLOVE SURG SZ 85 L12IN FNGR THK79MIL GRN LTX FREE

## (undated) DEVICE — CEMENT MIXING SYSTEM WITH FEMORAL BREAKWAY NOZZLE: Brand: REVOLUTION

## (undated) DEVICE — FIBER LSR 200UM 2J 80HZ 60W D F L FOR LITHO MOSES

## (undated) DEVICE — SHEET,DRAPE,53X77,STERILE: Brand: MEDLINE

## (undated) DEVICE — RECIPROCATING BLADE DOUBLE SIDE (74.0 X 0.77MM)

## (undated) DEVICE — SURGICAL PROCEDURE PACK KNEE TOT DBD CDS LOURDES HOSP LF

## (undated) DEVICE — PIN FIX STERILE L80MM DIA3.2MM FLUT CAS

## (undated) DEVICE — SOLUTION IV IRRIG WATER 1000ML POUR BRL 2F7114

## (undated) DEVICE — GOWN,PREVENTION PLUS,XL,ST,24/CS: Brand: MEDLINE

## (undated) DEVICE — BALLOON DILATATION CATHETER: Brand: UROMAX ULTRA